# Patient Record
Sex: FEMALE | Race: BLACK OR AFRICAN AMERICAN | Employment: OTHER | URBAN - METROPOLITAN AREA
[De-identification: names, ages, dates, MRNs, and addresses within clinical notes are randomized per-mention and may not be internally consistent; named-entity substitution may affect disease eponyms.]

---

## 2017-01-14 ENCOUNTER — HOSPITAL ENCOUNTER (OUTPATIENT)
Dept: MAMMOGRAPHY | Facility: HOSPITAL | Age: 67
Discharge: HOME | End: 2017-01-14
Attending: INTERNAL MEDICINE
Payer: COMMERCIAL

## 2017-01-14 DIAGNOSIS — Z12.31 VISIT FOR SCREENING MAMMOGRAM: ICD-10-CM

## 2017-01-14 PROCEDURE — 77067 SCR MAMMO BI INCL CAD: CPT

## 2017-01-16 ENCOUNTER — OFFICE VISIT (OUTPATIENT)
Dept: INTERNAL MEDICINE CLINIC | Facility: CLINIC | Age: 67
End: 2017-01-16

## 2017-01-16 ENCOUNTER — OFFICE VISIT (OUTPATIENT)
Dept: PODIATRY CLINIC | Facility: CLINIC | Age: 67
End: 2017-01-16

## 2017-01-16 VITALS
DIASTOLIC BLOOD PRESSURE: 78 MMHG | RESPIRATION RATE: 16 BRPM | WEIGHT: 175.81 LBS | HEART RATE: 73 BPM | BODY MASS INDEX: 32.35 KG/M2 | SYSTOLIC BLOOD PRESSURE: 112 MMHG | HEIGHT: 62 IN

## 2017-01-16 DIAGNOSIS — M54.32 SCIATICA OF LEFT SIDE: ICD-10-CM

## 2017-01-16 DIAGNOSIS — M48.061 FORAMINAL STENOSIS OF LUMBAR REGION: Primary | ICD-10-CM

## 2017-01-16 DIAGNOSIS — L89.891 PRESSURE ULCER OF TOE OF RIGHT FOOT, STAGE 1: Primary | ICD-10-CM

## 2017-01-16 DIAGNOSIS — Z23 NEED FOR VACCINATION: ICD-10-CM

## 2017-01-16 PROCEDURE — 99214 OFFICE O/P EST MOD 30 MIN: CPT | Performed by: INTERNAL MEDICINE

## 2017-01-16 PROCEDURE — 99213 OFFICE O/P EST LOW 20 MIN: CPT

## 2017-01-16 PROCEDURE — 99212 OFFICE O/P EST SF 10 MIN: CPT | Performed by: INTERNAL MEDICINE

## 2017-01-16 PROCEDURE — 90471 IMMUNIZATION ADMIN: CPT | Performed by: INTERNAL MEDICINE

## 2017-01-16 PROCEDURE — 90472 IMMUNIZATION ADMIN EACH ADD: CPT | Performed by: INTERNAL MEDICINE

## 2017-01-16 PROCEDURE — 90662 IIV NO PRSV INCREASED AG IM: CPT | Performed by: INTERNAL MEDICINE

## 2017-01-16 PROCEDURE — 20600 DRAIN/INJ JOINT/BURSA W/O US: CPT

## 2017-01-16 PROCEDURE — 90732 PPSV23 VACC 2 YRS+ SUBQ/IM: CPT | Performed by: INTERNAL MEDICINE

## 2017-01-16 RX ORDER — GABAPENTIN 300 MG/1
300 CAPSULE ORAL 2 TIMES DAILY
Qty: 60 CAPSULE | Refills: 3 | Status: SHIPPED | OUTPATIENT
Start: 2017-01-16 | End: 2017-11-14

## 2017-01-16 RX ORDER — BETAMETHASONE SODIUM PHOSPHATE AND BETAMETHASONE ACETATE 3; 3 MG/ML; MG/ML
6 INJECTION, SUSPENSION INTRA-ARTICULAR; INTRALESIONAL; INTRAMUSCULAR; SOFT TISSUE ONCE
Status: COMPLETED | OUTPATIENT
Start: 2017-01-16 | End: 2017-01-16

## 2017-01-16 RX ADMIN — BETAMETHASONE SODIUM PHOSPHATE AND BETAMETHASONE ACETATE 6 MG: 3; 3 INJECTION, SUSPENSION INTRA-ARTICULAR; INTRALESIONAL; INTRAMUSCULAR; SOFT TISSUE at 16:09:00

## 2017-01-16 NOTE — PROGRESS NOTES
HPI:    Patient ID: Milo Lackey is a 74year old female. HPI Comments: Her back pain is still quite bad. She had the MRI done 12.20. 16. The gabapentin helps a little bit.   She has had spinal injections in the past.  She also had a right laminect She exhibits tenderness and pain. She exhibits normal range of motion, no deformity and no spasm. Neurological: She is alert and oriented to person, place, and time. She has normal strength. She displays normal reflexes. No motor deficit.      MRI lumbar

## 2017-01-16 NOTE — PROGRESS NOTES
HPI:    Patient ID: Brandy Mensah is a 74year old female. HPI     1. Right little toe pain   The pain is present in the right foot (right little toe). Treatments tried: cortisone 10/15/2015. The treatment provided significant relief.  But she report Rfl: 3   ibuprofen (MOTRIN) 200 MG Oral Tab Take 200 mg by mouth every 6 (six) hours as needed for Pain. Disp:  Rfl:    Omega-3-acid Ethyl Esters (LOVAZA) 1 G Oral Cap Take 1 g by mouth daily.  Disp:  Rfl:      Allergies:  Vicodin [Hydrocodon*    Itching 01/16/2017 4:08 PM

## 2017-01-16 NOTE — PROGRESS NOTES
Per verbal order of Dr. Migel Cook, injection of Lidocaine 2% 2cc and Celestone 6mg (1cc) drawn up in one syringe.

## 2017-05-01 ENCOUNTER — HOSPITAL ENCOUNTER (OUTPATIENT)
Dept: GENERAL RADIOLOGY | Facility: HOSPITAL | Age: 67
Discharge: HOME | End: 2017-05-01
Attending: NEUROLOGICAL SURGERY
Payer: COMMERCIAL

## 2017-05-01 DIAGNOSIS — Z13.828 SCOLIOSIS CONCERN: ICD-10-CM

## 2017-05-01 DIAGNOSIS — M54.5 LOW BACK PAIN, UNSPECIFIED BACK PAIN LATERALITY, UNSPECIFIED CHRONICITY, WITH SCIATICA PRESENCE UNSPECIFIED: ICD-10-CM

## 2017-05-01 PROCEDURE — 72110 X-RAY EXAM L-2 SPINE 4/>VWS: CPT

## 2017-05-01 PROCEDURE — 72082 X-RAY EXAM ENTIRE SPI 2/3 VW: CPT

## 2017-07-15 ENCOUNTER — LAB ENCOUNTER (OUTPATIENT)
Dept: LAB | Age: 67
End: 2017-07-15
Attending: INTERNAL MEDICINE
Payer: COMMERCIAL

## 2017-07-15 ENCOUNTER — OFFICE VISIT (OUTPATIENT)
Dept: INTERNAL MEDICINE CLINIC | Facility: CLINIC | Age: 67
End: 2017-07-15

## 2017-07-15 VITALS
BODY MASS INDEX: 26.2 KG/M2 | HEART RATE: 76 BPM | RESPIRATION RATE: 16 BRPM | HEIGHT: 62 IN | WEIGHT: 142.38 LBS | DIASTOLIC BLOOD PRESSURE: 61 MMHG | SYSTOLIC BLOOD PRESSURE: 95 MMHG

## 2017-07-15 DIAGNOSIS — M54.32 SCIATICA OF LEFT SIDE: Primary | ICD-10-CM

## 2017-07-15 DIAGNOSIS — Z00.00 ROUTINE HEALTH MAINTENANCE: ICD-10-CM

## 2017-07-15 DIAGNOSIS — E78.00 HYPERCHOLESTEROLEMIA: ICD-10-CM

## 2017-07-15 LAB
ALBUMIN SERPL BCP-MCNC: 3.7 G/DL (ref 3.5–4.8)
ALBUMIN/GLOB SERPL: 1.3 {RATIO} (ref 1–2)
ALP SERPL-CCNC: 72 U/L (ref 32–100)
ALT SERPL-CCNC: 21 U/L (ref 14–54)
ANION GAP SERPL CALC-SCNC: 6 MMOL/L (ref 0–18)
AST SERPL-CCNC: 21 U/L (ref 15–41)
BASOPHILS # BLD: 0.1 K/UL (ref 0–0.2)
BASOPHILS NFR BLD: 1 %
BILIRUB SERPL-MCNC: 0.6 MG/DL (ref 0.3–1.2)
BUN SERPL-MCNC: 16 MG/DL (ref 8–20)
BUN/CREAT SERPL: 18.4 (ref 10–20)
CALCIUM SERPL-MCNC: 9.1 MG/DL (ref 8.5–10.5)
CHLORIDE SERPL-SCNC: 105 MMOL/L (ref 95–110)
CHOLEST SERPL-MCNC: 195 MG/DL (ref 110–200)
CO2 SERPL-SCNC: 29 MMOL/L (ref 22–32)
CREAT SERPL-MCNC: 0.87 MG/DL (ref 0.5–1.5)
EOSINOPHIL # BLD: 0.3 K/UL (ref 0–0.7)
EOSINOPHIL NFR BLD: 4 %
ERYTHROCYTE [DISTWIDTH] IN BLOOD BY AUTOMATED COUNT: 13.8 % (ref 11–15)
GLOBULIN PLAS-MCNC: 2.9 G/DL (ref 2.5–3.7)
GLUCOSE SERPL-MCNC: 76 MG/DL (ref 70–99)
HCT VFR BLD AUTO: 39.9 % (ref 35–48)
HDLC SERPL-MCNC: 31 MG/DL
HGB BLD-MCNC: 13.8 G/DL (ref 12–16)
LDLC SERPL CALC-MCNC: 124 MG/DL (ref 0–99)
LYMPHOCYTES # BLD: 2.6 K/UL (ref 1–4)
LYMPHOCYTES NFR BLD: 30 %
MCH RBC QN AUTO: 30.9 PG (ref 27–32)
MCHC RBC AUTO-ENTMCNC: 34.5 G/DL (ref 32–37)
MCV RBC AUTO: 89.5 FL (ref 80–100)
MONOCYTES # BLD: 0.7 K/UL (ref 0–1)
MONOCYTES NFR BLD: 8 %
NEUTROPHILS # BLD AUTO: 4.9 K/UL (ref 1.8–7.7)
NEUTROPHILS NFR BLD: 57 %
NONHDLC SERPL-MCNC: 164 MG/DL
OSMOLALITY UR CALC.SUM OF ELEC: 290 MOSM/KG (ref 275–295)
PLATELET # BLD AUTO: 261 K/UL (ref 140–400)
PMV BLD AUTO: 9.2 FL (ref 7.4–10.3)
POTASSIUM SERPL-SCNC: 3.7 MMOL/L (ref 3.3–5.1)
PROT SERPL-MCNC: 6.6 G/DL (ref 5.9–8.4)
RBC # BLD AUTO: 4.46 M/UL (ref 3.7–5.4)
SODIUM SERPL-SCNC: 140 MMOL/L (ref 136–144)
TRIGL SERPL-MCNC: 202 MG/DL (ref 1–149)
WBC # BLD AUTO: 8.6 K/UL (ref 4–11)

## 2017-07-15 PROCEDURE — 80061 LIPID PANEL: CPT

## 2017-07-15 PROCEDURE — 99214 OFFICE O/P EST MOD 30 MIN: CPT | Performed by: INTERNAL MEDICINE

## 2017-07-15 PROCEDURE — 99212 OFFICE O/P EST SF 10 MIN: CPT | Performed by: INTERNAL MEDICINE

## 2017-07-15 PROCEDURE — 85025 COMPLETE CBC W/AUTO DIFF WBC: CPT

## 2017-07-15 PROCEDURE — 36415 COLL VENOUS BLD VENIPUNCTURE: CPT

## 2017-07-15 PROCEDURE — 83036 HEMOGLOBIN GLYCOSYLATED A1C: CPT

## 2017-07-15 PROCEDURE — 80053 COMPREHEN METABOLIC PANEL: CPT

## 2017-07-15 RX ORDER — CYCLOBENZAPRINE HCL 10 MG
10 TABLET ORAL 2 TIMES DAILY PRN
Qty: 60 TABLET | Refills: 3 | Status: SHIPPED | OUTPATIENT
Start: 2017-07-15 | End: 2017-10-17

## 2017-07-15 RX ORDER — PANTOPRAZOLE SODIUM 40 MG/1
40 TABLET, DELAYED RELEASE ORAL
COMMUNITY
Start: 2017-05-01 | End: 2017-09-13

## 2017-07-15 RX ORDER — BACLOFEN 10 MG/1
10 TABLET ORAL
COMMUNITY
End: 2017-08-31

## 2017-07-15 RX ORDER — LIOTHYRONINE SODIUM 5 UG/1
25 TABLET ORAL
COMMUNITY
End: 2017-08-31

## 2017-07-15 RX ORDER — ROSUVASTATIN CALCIUM 20 MG/1
20 TABLET, COATED ORAL
COMMUNITY
End: 2017-08-31

## 2017-07-15 RX ORDER — TRAMADOL HYDROCHLORIDE 50 MG/1
50 TABLET ORAL EVERY 6 HOURS PRN
COMMUNITY
End: 2018-07-03

## 2017-07-15 RX ORDER — CYCLOBENZAPRINE HCL 10 MG
10 TABLET ORAL
COMMUNITY
End: 2017-07-15

## 2017-07-16 LAB — HBA1C MFR BLD: 6.3 % (ref 4–6)

## 2017-07-22 ENCOUNTER — TELEPHONE (OUTPATIENT)
Dept: INTERNAL MEDICINE CLINIC | Facility: CLINIC | Age: 67
End: 2017-07-22

## 2017-07-22 ENCOUNTER — APPOINTMENT (OUTPATIENT)
Dept: LAB | Facility: HOSPITAL | Age: 67
End: 2017-07-22
Attending: INTERNAL MEDICINE
Payer: COMMERCIAL

## 2017-07-22 DIAGNOSIS — R94.6 ABNORMAL RESULTS OF THYROID FUNCTION STUDIES: ICD-10-CM

## 2017-07-22 DIAGNOSIS — Z78.0 ASYMPTOMATIC MENOPAUSE: ICD-10-CM

## 2017-07-22 DIAGNOSIS — Z00.00 HEALTH CARE MAINTENANCE: Primary | ICD-10-CM

## 2017-07-22 LAB — TSH SERPL-ACNC: 0.45 UIU/ML (ref 0.45–5.33)

## 2017-07-22 PROCEDURE — 84443 ASSAY THYROID STIM HORMONE: CPT

## 2017-07-22 PROCEDURE — 36415 COLL VENOUS BLD VENIPUNCTURE: CPT

## 2017-07-29 NOTE — TELEPHONE ENCOUNTER
I ordered the Dexa scan. Call [phone] to schedule it. I recommend that she schedule a complete physical also. For some reason she has appointments in October, 10 days apart. Neither one are physicals.   Perhaps she could schedule a physical in [facility]

## 2017-07-31 NOTE — TELEPHONE ENCOUNTER
Left message for patient to call back. Please transfer to Brooklyn Matute @ ext [phone] for today only.

## 2017-08-01 ENCOUNTER — TELEPHONE (OUTPATIENT)
Dept: NEUROLOGY | Facility: CLINIC | Age: 67
End: 2017-08-01

## 2017-08-02 NOTE — TELEPHONE ENCOUNTER
Spoke with patient and relayed 's message. Both follow up appointments were cancelled. Physical appointment made for 10/17/17 at 1 pm here at the [facility]. Patient voiced her understanding of this.

## 2017-08-03 ENCOUNTER — TELEPHONE (OUTPATIENT)
Dept: NEUROLOGY | Facility: CLINIC | Age: 67
End: 2017-08-03

## 2017-08-03 ENCOUNTER — OFFICE VISIT (OUTPATIENT)
Dept: NEUROLOGY | Facility: CLINIC | Age: 67
End: 2017-08-03

## 2017-08-03 VITALS
SYSTOLIC BLOOD PRESSURE: 114 MMHG | DIASTOLIC BLOOD PRESSURE: 70 MMHG | RESPIRATION RATE: 16 BRPM | BODY MASS INDEX: 29.88 KG/M2 | OXYGEN SATURATION: 95 % | HEART RATE: 65 BPM | WEIGHT: 175 LBS | HEIGHT: 64 IN

## 2017-08-03 DIAGNOSIS — M54.16 LUMBAR RADICULOPATHY: Primary | ICD-10-CM

## 2017-08-03 DIAGNOSIS — M43.16 SPONDYLOLISTHESIS OF LUMBAR REGION: ICD-10-CM

## 2017-08-03 DIAGNOSIS — M96.1 LUMBAR POST-LAMINECTOMY SYNDROME: ICD-10-CM

## 2017-08-03 DIAGNOSIS — M51.9 LUMBAR DISC DISEASE: ICD-10-CM

## 2017-08-03 DIAGNOSIS — M48.061 LUMBAR STENOSIS: ICD-10-CM

## 2017-08-03 DIAGNOSIS — M48.061 LUMBAR FORAMINAL STENOSIS: ICD-10-CM

## 2017-08-03 DIAGNOSIS — M51.26 LUMBAR HERNIATED DISC: ICD-10-CM

## 2017-08-03 PROCEDURE — 99244 OFF/OP CNSLTJ NEW/EST MOD 40: CPT | Performed by: PHYSICAL MEDICINE & REHABILITATION

## 2017-08-03 NOTE — TELEPHONE ENCOUNTER
Called [facility] BS for authorization of approval of bilateral L4 TFESIs cpt codes O6826274, R0587135. Talked to [address] [place] [address]. who states no authorization is required. Reference # [id number]. Will inform Nursing.

## 2017-08-03 NOTE — PROGRESS NOTES
Patient has been scheduled for a bilateral L4 TFESIs under MAC sedatiopn on 8/25/17 at the [facility]. Medications and allergies reviewed. Patient informed to hold aspirins, nsaids, blood thinners, vitamins and fish oils 5-7 days prior to procedure.  Patient info

## 2017-08-03 NOTE — PROGRESS NOTES
Low Back Pain H & P    Chief Complaint:  Patient presents with:  Low Back Pain: NP referred by Dr Jake Novoa. Pt is having back pain both sides,worse on left. Pain goes down leg to ankle constant varies in degree. Pain is 8/10 back and 10plus/10 left leg.  Pt c The pain is currently  8/10. The pain is described as a(n) aching and throbbing sensation and a shooting pain in the left knee. · The pain is worse standing and walking. · The pain is better sitting.   · There is numbness and tingling in the right an pain. The patient reports constipation due to her medications. :   The patient denies urinary problems. Endocrine:  Negative for cold and heat intolerance.   Neuro:   Negative for headaches, memory impairment  Psych:   Negative for anxiety, depression, tendon.     Vascular lower extremity:   Dorsalis pedis pulse-RIGHT 2+   Dorsalis pedis pulse-LEFT 2+   Tibialis posterior pulse-RIGHT 2+   Tibialis posterior pulse-LEFT 2+      Neurological Lower Extremity:    Light Touch Sensation: Intact in bilateral LE e L5-S1 left mod/right severe, L4-5 left mild-mod/right severe, L3-4 left severe, L2-3 left mod-severe foraminal stenosis    4. L3-4 mod-severe, L2-3 mod-severe, L1-2 mod central stenosis    5.  L5-S1 mild-mod diffuse & bilateral mod far lateral, L4-5 right l

## 2017-08-03 NOTE — PATIENT INSTRUCTIONS
Plan  I will do bilateral L4 TFESI's under MAC. The patient will follow up in 3 months, but the patient will call me 2 weeks after having the injection to let me know how the injection worked.     If she is doing better after having the injections, the

## 2017-08-25 ENCOUNTER — OFFICE VISIT (OUTPATIENT)
Dept: SURGERY | Facility: CLINIC | Age: 67
End: 2017-08-25

## 2017-08-25 DIAGNOSIS — M54.16 LUMBAR RADICULOPATHY: Primary | ICD-10-CM

## 2017-08-25 DIAGNOSIS — M96.1 LUMBAR POST-LAMINECTOMY SYNDROME: ICD-10-CM

## 2017-08-25 DIAGNOSIS — M48.061 LUMBAR STENOSIS: ICD-10-CM

## 2017-08-25 PROCEDURE — 64483 NJX AA&/STRD TFRM EPI L/S 1: CPT | Performed by: PHYSICAL MEDICINE & REHABILITATION

## 2017-08-25 NOTE — PROCEDURES
[facility]    BILATERAL LUMBAR TRANSFORAMINAL   NAME:  Evone Screws    MR #:    [id number] :  1950     PHYSICIAN:  Emery Bowman        Operative Report    DATE OF PROCEDURE: 2017   PREOPERATIVE DIAGNOSES: 1. bilat mg/cc of Kenalog and 1.5 cc of 1% PF lidocaine without epinephrine. After this, the needle was removed. Then  fluoroscopy was right anterior obliqued opening up the left L4-5 intervertebral foramen.   At this point in time, the patient's skin was anesthet

## 2017-09-07 ENCOUNTER — NURSE TRIAGE (OUTPATIENT)
Dept: OTHER | Age: 67
End: 2017-09-07

## 2017-09-07 ENCOUNTER — HOSPITAL (OUTPATIENT)
Dept: OTHER | Age: 67
End: 2017-09-07
Attending: INTERNAL MEDICINE

## 2017-09-07 LAB
ALBUMIN SERPL-MCNC: 3.6 GM/DL (ref 3.6–5.1)
ALBUMIN/GLOB SERPL: 0.9 {RATIO} (ref 1–2.4)
ALP SERPL-CCNC: 83 UNIT/L (ref 45–117)
ALT SERPL-CCNC: 23 UNIT/L
ANALYZER ANC (IANC): ABNORMAL
ANION GAP SERPL CALC-SCNC: 10 MMOL/L (ref 10–20)
AST SERPL-CCNC: 16 UNIT/L
BASOPHILS # BLD: 0 THOUSAND/MCL (ref 0–0.3)
BASOPHILS NFR BLD: 0 %
BILIRUB SERPL-MCNC: 0.7 MG/DL (ref 0.2–1)
BUN SERPL-MCNC: 22 MG/DL (ref 6–20)
BUN/CREAT SERPL: 26 (ref 7–25)
CALCIUM SERPL-MCNC: 9.7 MG/DL (ref 8.4–10.2)
CHLORIDE: 101 MMOL/L (ref 98–107)
CO2 SERPL-SCNC: 31 MMOL/L (ref 21–32)
CREAT SERPL-MCNC: 0.85 MG/DL (ref 0.51–0.95)
DIFFERENTIAL METHOD BLD: ABNORMAL
EOSINOPHIL # BLD: 0.2 THOUSAND/MCL (ref 0.1–0.5)
EOSINOPHIL NFR BLD: 1 %
ERYTHROCYTE [DISTWIDTH] IN BLOOD: 14.4 % (ref 11–15)
GLOBULIN SER-MCNC: 4.1 GM/DL (ref 2–4)
GLUCOSE BLDC GLUCOMTR-MCNC: 178 MG/DL (ref 65–99)
GLUCOSE SERPL-MCNC: 112 MG/DL (ref 65–99)
HEMATOCRIT: 42.8 % (ref 36–46.5)
HGB BLD-MCNC: 14.4 GM/DL (ref 12–15.5)
LYMPHOCYTES # BLD: 2.5 THOUSAND/MCL (ref 1–4)
LYMPHOCYTES NFR BLD: 16 %
MCH RBC QN AUTO: 30.6 PG (ref 26–34)
MCHC RBC AUTO-ENTMCNC: 33.6 GM/DL (ref 32–36.5)
MCV RBC AUTO: 90.9 FL (ref 78–100)
MONOCYTES # BLD: 0.8 THOUSAND/MCL (ref 0.3–0.9)
MONOCYTES NFR BLD: 5 %
NEUTROPHILS # BLD: 11.9 THOUSAND/MCL (ref 1.8–7.7)
NEUTROPHILS NFR BLD: 78 %
NEUTS SEG NFR BLD: ABNORMAL %
PERCENT NRBC: ABNORMAL
PLATELET # BLD: 324 THOUSAND/MCL (ref 140–450)
POTASSIUM SERPL-SCNC: 4.2 MMOL/L (ref 3.4–5.1)
PROCALCITONIN SERPL IA-MCNC: <0.05 NG/ML
PROT SERPL-MCNC: 7.7 GM/DL (ref 6.4–8.2)
RBC # BLD: 4.71 MILLION/MCL (ref 4–5.2)
SODIUM SERPL-SCNC: 138 MMOL/L (ref 135–145)
TROPONIN I SERPL HS-MCNC: <0.02 NG/ML
TROPONIN I SERPL HS-MCNC: <0.02 NG/ML
WBC # BLD: 15.5 THOUSAND/MCL (ref 4.2–11)

## 2017-09-07 NOTE — TELEPHONE ENCOUNTER
Action Requested: Summary for Provider     []  Critical Lab, Recommendations Needed  [] Need Additional Advice  [x]   FYI    []   Need Orders  [] Need Medications Sent to Pharmacy  []  Other     SUMMARY: Pt was advised to go to ER via EMS.      Pt states th

## 2017-09-08 ENCOUNTER — IMAGING SERVICES (OUTPATIENT)
Dept: OTHER | Age: 67
End: 2017-09-08

## 2017-09-08 LAB
ANALYZER ANC (IANC): ABNORMAL
ANION GAP SERPL CALC-SCNC: 10 MMOL/L (ref 10–20)
BASOPHILS # BLD: 0 THOUSAND/MCL (ref 0–0.3)
BASOPHILS NFR BLD: 0 %
BUN SERPL-MCNC: 20 MG/DL (ref 6–20)
BUN/CREAT SERPL: 22 (ref 7–25)
CALCIUM SERPL-MCNC: 9.4 MG/DL (ref 8.4–10.2)
CHLORIDE: 102 MMOL/L (ref 98–107)
CHOLEST SERPL-MCNC: 211 MG/DL
CHOLEST/HDLC SERPL: 5.4 {RATIO}
CO2 SERPL-SCNC: 33 MMOL/L (ref 21–32)
CREAT SERPL-MCNC: 0.93 MG/DL (ref 0.51–0.95)
DIFFERENTIAL METHOD BLD: ABNORMAL
EOSINOPHIL # BLD: 0.2 THOUSAND/MCL (ref 0.1–0.5)
EOSINOPHIL NFR BLD: 1 %
ERYTHROCYTE [DISTWIDTH] IN BLOOD: 14.4 % (ref 11–15)
GLUCOSE SERPL-MCNC: 110 MG/DL (ref 65–99)
HDLC SERPL-MCNC: 39 MG/DL
HEMATOCRIT: 41.9 % (ref 36–46.5)
HGB BLD-MCNC: 13.6 GM/DL (ref 12–15.5)
LDLC SERPL CALC-MCNC: 134 MG/DL
LYMPHOCYTES # BLD: 2.7 THOUSAND/MCL (ref 1–4)
LYMPHOCYTES NFR BLD: 19 %
MCH RBC QN AUTO: 30.3 PG (ref 26–34)
MCHC RBC AUTO-ENTMCNC: 32.5 GM/DL (ref 32–36.5)
MCV RBC AUTO: 93.3 FL (ref 78–100)
MONOCYTES # BLD: 1 THOUSAND/MCL (ref 0.3–0.9)
MONOCYTES NFR BLD: 7 %
NEUTROPHILS # BLD: 10.5 THOUSAND/MCL (ref 1.8–7.7)
NEUTROPHILS NFR BLD: 73 %
NEUTS SEG NFR BLD: ABNORMAL %
NONHDLC SERPL-MCNC: 172 MG/DL
PERCENT NRBC: ABNORMAL
PLATELET # BLD: 306 THOUSAND/MCL (ref 140–450)
POTASSIUM SERPL-SCNC: 4.2 MMOL/L (ref 3.4–5.1)
RBC # BLD: 4.49 MILLION/MCL (ref 4–5.2)
SODIUM SERPL-SCNC: 141 MMOL/L (ref 135–145)
TRIGLYCERIDE (TRIGP): 192 MG/DL
TSH SERPL-ACNC: 0.41 MCUNIT/ML (ref 0.35–5)
WBC # BLD: 14.5 THOUSAND/MCL (ref 4.2–11)

## 2017-09-09 ENCOUNTER — IMAGING SERVICES (OUTPATIENT)
Dept: OTHER | Age: 67
End: 2017-09-09

## 2017-09-09 ENCOUNTER — TELEPHONE (OUTPATIENT)
Dept: INTERNAL MEDICINE CLINIC | Facility: CLINIC | Age: 67
End: 2017-09-09

## 2017-09-09 LAB
ANALYZER ANC (IANC): ABNORMAL
ANION GAP SERPL CALC-SCNC: 13 MMOL/L (ref 10–20)
BASOPHILS # BLD: 0 THOUSAND/MCL (ref 0–0.3)
BASOPHILS NFR BLD: 0 %
BUN SERPL-MCNC: 20 MG/DL (ref 6–20)
BUN/CREAT SERPL: 28 (ref 7–25)
CALCIUM SERPL-MCNC: 9.2 MG/DL (ref 8.4–10.2)
CHLORIDE: 105 MMOL/L (ref 98–107)
CO2 SERPL-SCNC: 26 MMOL/L (ref 21–32)
CREAT SERPL-MCNC: 0.71 MG/DL (ref 0.51–0.95)
DIFFERENTIAL METHOD BLD: ABNORMAL
EOSINOPHIL # BLD: 0.1 THOUSAND/MCL (ref 0.1–0.5)
EOSINOPHIL NFR BLD: 1 %
ERYTHROCYTE [DISTWIDTH] IN BLOOD: 14.4 % (ref 11–15)
GLUCOSE SERPL-MCNC: 106 MG/DL (ref 65–99)
HEMATOCRIT: 41.9 % (ref 36–46.5)
HGB BLD-MCNC: 14 GM/DL (ref 12–15.5)
LYMPHOCYTES # BLD: 2.3 THOUSAND/MCL (ref 1–4)
LYMPHOCYTES NFR BLD: 19 %
MAGNESIUM SERPL-MCNC: 2.4 MG/DL (ref 1.7–2.4)
MCH RBC QN AUTO: 30.4 PG (ref 26–34)
MCHC RBC AUTO-ENTMCNC: 33.4 GM/DL (ref 32–36.5)
MCV RBC AUTO: 91.1 FL (ref 78–100)
MONOCYTES # BLD: 0.9 THOUSAND/MCL (ref 0.3–0.9)
MONOCYTES NFR BLD: 7 %
NEUTROPHILS # BLD: 9 THOUSAND/MCL (ref 1.8–7.7)
NEUTROPHILS NFR BLD: 73 %
NEUTS SEG NFR BLD: ABNORMAL %
PERCENT NRBC: ABNORMAL
PLATELET # BLD: 256 THOUSAND/MCL (ref 140–450)
POTASSIUM SERPL-SCNC: 4.3 MMOL/L (ref 3.4–5.1)
RBC # BLD: 4.6 MILLION/MCL (ref 4–5.2)
SODIUM SERPL-SCNC: 140 MMOL/L (ref 135–145)
WBC # BLD: 12.4 THOUSAND/MCL (ref 4.2–11)

## 2017-09-09 NOTE — TELEPHONE ENCOUNTER
Patient called and stated she is currently in the hospital at [facility] and will be discharged soon-    Requesting for an for next week-  Requesting a call back from office-

## 2017-09-09 NOTE — TELEPHONE ENCOUNTER
Pt states currently at [facility] as advised on 9/7/17 by office RN ( see triage encounter 9/7) for complaints of chest pain. Still admitted as inpatient; having workup.  Reports she had first half of stress test yesterday and today is scheduled to ha

## 2017-09-11 NOTE — TELEPHONE ENCOUNTER
Pt called in to follow up. Pt was scheduled for tomorrow, did not need to use a SDS, there was a cancellation already.

## 2017-09-12 ENCOUNTER — TELEPHONE (OUTPATIENT)
Dept: INTERNAL MEDICINE CLINIC | Facility: CLINIC | Age: 67
End: 2017-09-12

## 2017-09-12 ENCOUNTER — OFFICE VISIT (OUTPATIENT)
Dept: INTERNAL MEDICINE CLINIC | Facility: CLINIC | Age: 67
End: 2017-09-12

## 2017-09-12 ENCOUNTER — APPOINTMENT (OUTPATIENT)
Dept: GENERAL RADIOLOGY | Facility: HOSPITAL | Age: 67
DRG: 247 | End: 2017-09-12
Attending: EMERGENCY MEDICINE
Payer: COMMERCIAL

## 2017-09-12 ENCOUNTER — HOSPITAL ENCOUNTER (INPATIENT)
Facility: HOSPITAL | Age: 67
LOS: 1 days | Discharge: HOME | DRG: 247 | End: 2017-09-13
Attending: EMERGENCY MEDICINE | Admitting: HOSPITALIST
Payer: COMMERCIAL

## 2017-09-12 ENCOUNTER — APPOINTMENT (OUTPATIENT)
Dept: INTERVENTIONAL RADIOLOGY/VASCULAR | Facility: HOSPITAL | Age: 67
DRG: 247 | End: 2017-09-12
Attending: INTERNAL MEDICINE
Payer: COMMERCIAL

## 2017-09-12 VITALS
WEIGHT: 165.13 LBS | HEART RATE: 83 BPM | HEIGHT: 64 IN | SYSTOLIC BLOOD PRESSURE: 124 MMHG | DIASTOLIC BLOOD PRESSURE: 85 MMHG | BODY MASS INDEX: 28.19 KG/M2

## 2017-09-12 DIAGNOSIS — I20.0 UNSTABLE ANGINA (HCC): Primary | ICD-10-CM

## 2017-09-12 DIAGNOSIS — R94.39 ABNORMAL STRESS TEST: ICD-10-CM

## 2017-09-12 DIAGNOSIS — E78.5 DYSLIPIDEMIA: ICD-10-CM

## 2017-09-12 DIAGNOSIS — R07.9 ACUTE CHEST PAIN: Primary | ICD-10-CM

## 2017-09-12 DIAGNOSIS — R73.03 PREDIABETES: ICD-10-CM

## 2017-09-12 LAB
ANION GAP SERPL CALC-SCNC: 8 MMOL/L (ref 0–18)
APTT PPP: 30.6 SECONDS (ref 23.2–35.3)
APTT PPP: 40.5 SECONDS (ref 23.2–35.3)
BASOPHILS # BLD: 0.1 K/UL (ref 0–0.2)
BASOPHILS NFR BLD: 1 %
BUN SERPL-MCNC: 16 MG/DL (ref 8–20)
BUN/CREAT SERPL: 15.2 (ref 10–20)
CALCIUM SERPL-MCNC: 9.6 MG/DL (ref 8.5–10.5)
CHLORIDE SERPL-SCNC: 101 MMOL/L (ref 95–110)
CO2 SERPL-SCNC: 30 MMOL/L (ref 22–32)
CREAT SERPL-MCNC: 1.05 MG/DL (ref 0.5–1.5)
EOSINOPHIL # BLD: 0.1 K/UL (ref 0–0.7)
EOSINOPHIL NFR BLD: 1 %
ERYTHROCYTE [DISTWIDTH] IN BLOOD BY AUTOMATED COUNT: 14.4 % (ref 11–15)
GLUCOSE SERPL-MCNC: 101 MG/DL (ref 70–99)
HCT VFR BLD AUTO: 43.5 % (ref 35–48)
HGB BLD-MCNC: 14.5 G/DL (ref 12–16)
INR BLD: 1 (ref 0.9–1.2)
INR BLD: 1.1 (ref 0.9–1.2)
LYMPHOCYTES # BLD: 2 K/UL (ref 1–4)
LYMPHOCYTES NFR BLD: 15 %
MCH RBC QN AUTO: 30.3 PG (ref 27–32)
MCHC RBC AUTO-ENTMCNC: 33.4 G/DL (ref 32–37)
MCV RBC AUTO: 90.5 FL (ref 80–100)
MONOCYTES # BLD: 0.8 K/UL (ref 0–1)
MONOCYTES NFR BLD: 6 %
MRSA DNA SPEC QL NAA+PROBE: NEGATIVE
NEUTROPHILS # BLD AUTO: 10.3 K/UL (ref 1.8–7.7)
NEUTROPHILS NFR BLD: 78 %
OSMOLALITY UR CALC.SUM OF ELEC: 289 MOSM/KG (ref 275–295)
PLATELET # BLD AUTO: 294 K/UL (ref 140–400)
PMV BLD AUTO: 9.1 FL (ref 7.4–10.3)
POTASSIUM SERPL-SCNC: 3.6 MMOL/L (ref 3.3–5.1)
PROTHROMBIN TIME: 12.3 SECONDS (ref 11.8–14.5)
PROTHROMBIN TIME: 13.5 SECONDS (ref 11.8–14.5)
RBC # BLD AUTO: 4.8 M/UL (ref 3.7–5.4)
SODIUM SERPL-SCNC: 139 MMOL/L (ref 136–144)
TROPONIN I SERPL-MCNC: 0 NG/ML (ref ?–0.03)
WBC # BLD AUTO: 13.2 K/UL (ref 4–11)

## 2017-09-12 PROCEDURE — 99222 1ST HOSP IP/OBS MODERATE 55: CPT | Performed by: HOSPITALIST

## 2017-09-12 PROCEDURE — 99214 OFFICE O/P EST MOD 30 MIN: CPT | Performed by: INTERNAL MEDICINE

## 2017-09-12 PROCEDURE — 99212 OFFICE O/P EST SF 10 MIN: CPT | Performed by: INTERNAL MEDICINE

## 2017-09-12 PROCEDURE — 71010 XR CHEST AP PORTABLE  (CPT=71010): CPT | Performed by: EMERGENCY MEDICINE

## 2017-09-12 RX ORDER — CHLORHEXIDINE GLUCONATE 4 G/100ML
30 SOLUTION TOPICAL
Status: COMPLETED | OUTPATIENT
Start: 2017-09-13 | End: 2017-09-13

## 2017-09-12 RX ORDER — ASPIRIN 81 MG/1
324 TABLET, CHEWABLE ORAL ONCE
Status: DISCONTINUED | OUTPATIENT
Start: 2017-09-12 | End: 2017-09-13

## 2017-09-12 RX ORDER — MIDAZOLAM HYDROCHLORIDE 1 MG/ML
INJECTION INTRAMUSCULAR; INTRAVENOUS
Status: COMPLETED
Start: 2017-09-12 | End: 2017-09-12

## 2017-09-12 RX ORDER — TRAMADOL HYDROCHLORIDE 50 MG/1
50 TABLET ORAL EVERY 6 HOURS PRN
Status: DISCONTINUED | OUTPATIENT
Start: 2017-09-12 | End: 2017-09-13

## 2017-09-12 RX ORDER — GABAPENTIN 300 MG/1
300 CAPSULE ORAL 2 TIMES DAILY
Status: DISCONTINUED | OUTPATIENT
Start: 2017-09-12 | End: 2017-09-13

## 2017-09-12 RX ORDER — PRASUGREL 10 MG/1
TABLET, FILM COATED ORAL
Status: COMPLETED
Start: 2017-09-12 | End: 2017-09-12

## 2017-09-12 RX ORDER — NITROGLYCERIN 20 MG/100ML
INJECTION INTRAVENOUS
Status: COMPLETED
Start: 2017-09-12 | End: 2017-09-12

## 2017-09-12 RX ORDER — BISACODYL 10 MG
10 SUPPOSITORY, RECTAL RECTAL
Status: DISCONTINUED | OUTPATIENT
Start: 2017-09-12 | End: 2017-09-13

## 2017-09-12 RX ORDER — CYCLOBENZAPRINE HCL 10 MG
10 TABLET ORAL 2 TIMES DAILY PRN
Status: DISCONTINUED | OUTPATIENT
Start: 2017-09-12 | End: 2017-09-13

## 2017-09-12 RX ORDER — ASPIRIN 81 MG/1
81 TABLET ORAL DAILY
Status: DISCONTINUED | OUTPATIENT
Start: 2017-09-13 | End: 2017-09-13

## 2017-09-12 RX ORDER — PRASUGREL 10 MG/1
10 TABLET, FILM COATED ORAL DAILY
Status: DISCONTINUED | OUTPATIENT
Start: 2017-09-13 | End: 2017-09-13

## 2017-09-12 RX ORDER — LIDOCAINE HYDROCHLORIDE 20 MG/ML
INJECTION, SOLUTION EPIDURAL; INFILTRATION; INTRACAUDAL; PERINEURAL
Status: COMPLETED
Start: 2017-09-12 | End: 2017-09-12

## 2017-09-12 RX ORDER — SODIUM CHLORIDE 9 MG/ML
INJECTION, SOLUTION INTRAVENOUS CONTINUOUS
Status: DISCONTINUED | OUTPATIENT
Start: 2017-09-12 | End: 2017-09-13

## 2017-09-12 RX ORDER — ONDANSETRON 2 MG/ML
4 INJECTION INTRAMUSCULAR; INTRAVENOUS EVERY 6 HOURS PRN
Status: DISCONTINUED | OUTPATIENT
Start: 2017-09-12 | End: 2017-09-13

## 2017-09-12 RX ORDER — SODIUM CHLORIDE 9 MG/ML
INJECTION, SOLUTION INTRAVENOUS
Status: COMPLETED
Start: 2017-09-12 | End: 2017-09-12

## 2017-09-12 RX ORDER — SODIUM CHLORIDE 9 MG/ML
INJECTION, SOLUTION INTRAVENOUS CONTINUOUS
Status: ACTIVE | OUTPATIENT
Start: 2017-09-12 | End: 2017-09-12

## 2017-09-12 RX ORDER — VERAPAMIL HYDROCHLORIDE 2.5 MG/ML
INJECTION, SOLUTION INTRAVENOUS
Status: COMPLETED
Start: 2017-09-12 | End: 2017-09-12

## 2017-09-12 RX ORDER — PANTOPRAZOLE SODIUM 40 MG/1
40 TABLET, DELAYED RELEASE ORAL
Status: DISCONTINUED | OUTPATIENT
Start: 2017-09-13 | End: 2017-09-13

## 2017-09-12 RX ORDER — HEPARIN SODIUM 1000 [USP'U]/ML
INJECTION, SOLUTION INTRAVENOUS; SUBCUTANEOUS
Status: COMPLETED
Start: 2017-09-12 | End: 2017-09-12

## 2017-09-12 RX ORDER — POLYETHYLENE GLYCOL 3350 17 G/17G
17 POWDER, FOR SOLUTION ORAL DAILY PRN
Status: DISCONTINUED | OUTPATIENT
Start: 2017-09-12 | End: 2017-09-13

## 2017-09-12 RX ORDER — ASPIRIN 81 MG/1
TABLET, CHEWABLE ORAL
Status: COMPLETED
Start: 2017-09-12 | End: 2017-09-12

## 2017-09-12 RX ORDER — SODIUM PHOSPHATE, DIBASIC AND SODIUM PHOSPHATE, MONOBASIC 7; 19 G/133ML; G/133ML
1 ENEMA RECTAL ONCE AS NEEDED
Status: DISCONTINUED | OUTPATIENT
Start: 2017-09-12 | End: 2017-09-13

## 2017-09-12 RX ORDER — DOCUSATE SODIUM 100 MG/1
100 CAPSULE, LIQUID FILLED ORAL 2 TIMES DAILY
Status: DISCONTINUED | OUTPATIENT
Start: 2017-09-12 | End: 2017-09-13

## 2017-09-12 RX ORDER — SODIUM CHLORIDE 0.9 % (FLUSH) 0.9 %
3 SYRINGE (ML) INJECTION AS NEEDED
Status: DISCONTINUED | OUTPATIENT
Start: 2017-09-12 | End: 2017-09-13

## 2017-09-12 NOTE — ED PROVIDER NOTES
Patient Seen in: [facility] AND [facility] Emergency Department    History   Patient presents with:  Chest Pain Angina (cardiovascular)    Stated Complaint:     HPI    Patient is a 64-year-old female who experienced some exertional chest pain last week.   She was °F (36.6 °C)  Temp src: Temporal  SpO2: 100 %  O2 Device: None (Room air)    Current:/76   Pulse 66   Temp 97.9 °F (36.6 °C) (Temporal)   Resp 12   Ht 162.6 cm (5' 4\")   Wt 74.8 kg   SpO2 99%   BMI 28.32 kg/m²         Physical Exam    Constitutional created for panel order CBC WITH DIFFERENTIAL WITH PLATELET.   Procedure                               Abnormality         Status                     ---------                               -----------         ------                     CBC W/ DIFFERENTIAL[

## 2017-09-12 NOTE — PLAN OF CARE
PATIENT ARRIVED IN [facility] AT 1254, PATIENT HAS NO COMPLAINTS. AT 25903, CALL RECEIVED FROM CATH LAB RN STATING THAT THEY ARE COMING TO TAKE PATIENT FOR CATH.  THIS RN WAS NOT ABLE TO REVIEW HOME MEDICATIONS WITH PATIENT, ABLE TO OBTAIN INFORMATION FOR ADMISSIO

## 2017-09-12 NOTE — TELEPHONE ENCOUNTER
[facility] and stress report was faxed to [phone] form faxed so that Meghan Mauro will have copy as well.

## 2017-09-12 NOTE — BRIEF PROCEDURE NOTE
[facility]    [facility]HS/[facility]G Cardiac Cath Procedure Note  Michael Ovalle Patient Status:  Inpatient    1950 MRN [id number]   Location [facility] Attending Charlotte Koenig MD   Psychiatric Day # 0 PCP Dm Myers,

## 2017-09-12 NOTE — CONSULTS
[facility] AND [facility]  MHS/AMG pre cath H&P    Eloy Betancur Patient Status:  Emergency    1950 MRN [id number]   Location [address] Attending Rao Schrader MD   Hosp Day # 0 PCP Trupti Brunson MD     69year old fe drugs.    Objective:   Temp: 97.9 °F (36.6 °C)  Pulse: 66  Resp: 12  BP: 122/76    Intake/Output:   No intake or output data in the 24 hours ending 09/12/17 1144    Physical Exam:     General: Alert and oriented x 3. No apparent distress.  No respiratory or

## 2017-09-12 NOTE — ED INITIAL ASSESSMENT (HPI)
Pt sent to ED by Dr. Demetria Dodd for admission. Pt was recently admitted at [facility] for Chest Pain and signed out [place]. Pt with abnl stress test result and needing cath.

## 2017-09-12 NOTE — CONSULTS
[facility]    PATIENT'S NAME: Chelsy Kapoor   ATTENDING PHYSICIAN: Ck Lee MD   CONSULTING PHYSICIAN: Nata Palomares.  Bambi Acosta MD   PATIENT ACCOUNT#:   [id number]    LOCATION:  [place] [address]  MEDICAL RECORD #:   [id number] have recommended that she go forward with a cardiac catheterization and intervention as needed. Discussed indications, risks, and benefits. I introduced her to Dr. Keyon Aguilar who will be performing the procedure today and she agrees to proceed.     Thank you fo

## 2017-09-12 NOTE — H&P
[facility]    PATIENT'S NAME: Oliver Perales   ATTENDING PHYSICIAN: Radha Santo MD   PATIENT ACCOUNT#:   [id number]    LOCATION:  [place] [address]  MEDICAL RECORD #:   [id number]       YOB: 1950  ADMISSION DATE: last week or 2, the symptoms have been more pronounced, becoming on less and less physical activity. She is asymptomatic at rest.  Currently, postprocedure, she is asymptomatic. Other 12-point review of systems is negative.       PHYSICAL EXAMINATION:

## 2017-09-12 NOTE — PROGRESS NOTES
HPI:    Patient ID: Thomas Palma is a 69year old female. Pt was hospitalized at [facility] for CP. She had CP for 2 days. She was taken by ambulance from work. NitroSL helped the pain.   They did tests in the ER which were negativ Outpatient Prescriptions:  Pantoprazole Sodium 40 MG Oral Tab EC Take 40 mg by mouth. Disp:  Rfl:    TraMADol HCl 50 MG Oral Tab Take 50 mg by mouth.  Disp:  Rfl:    Cyclobenzaprine HCl 10 MG Oral Tab Take 1 tablet (10 mg total) by mouth 2 (two) times daily cath and possible stent. I spoke with Dr. Cassidy Pike and he recommends admission to the hospital.         Abnormal stress test     Pt says she had an equivocal chemical stress test which was repeated.   She was then told she needed an angiogram.  Records fro

## 2017-09-12 NOTE — ASSESSMENT & PLAN NOTE
Pt has been having exertional CP, relieved with NTG. She is currently painfree. She left [place] prior to having a cath and possible stent.   I spoke with Dr. Maryse Cruz and he recommends admission to the hospital.

## 2017-09-12 NOTE — ASSESSMENT & PLAN NOTE
Pt says she had an equivocal chemical stress test which was repeated. She was then told she needed an angiogram.  Records from [facility] were not given to the pt, nor was she given medication.

## 2017-09-12 NOTE — PLAN OF CARE
Problem: Patient/Family Goals  Goal: Patient/Family Long Term Goal  Patient's Long Term Goal: medical compliance  Interventions:  - medication regimen  -doctors appointments annually and prn   - See additional Care Plan goals for specific interventions   O

## 2017-09-12 NOTE — ED NOTES
Pt signed HIPAA form, form given to [facility] for medical records. Pt updated on plan of care, report given to [facility] about above.  Prudy to tansport pt to CAA

## 2017-09-13 ENCOUNTER — TELEPHONE (OUTPATIENT)
Dept: INTERNAL MEDICINE CLINIC | Facility: CLINIC | Age: 67
End: 2017-09-13

## 2017-09-13 ENCOUNTER — PRIOR ORIGINAL RECORDS (OUTPATIENT)
Dept: OTHER | Age: 67
End: 2017-09-13

## 2017-09-13 VITALS
TEMPERATURE: 98 F | RESPIRATION RATE: 18 BRPM | BODY MASS INDEX: 28.09 KG/M2 | HEART RATE: 84 BPM | HEIGHT: 64 IN | DIASTOLIC BLOOD PRESSURE: 62 MMHG | OXYGEN SATURATION: 96 % | SYSTOLIC BLOOD PRESSURE: 109 MMHG | WEIGHT: 164.5 LBS

## 2017-09-13 LAB
ANION GAP SERPL CALC-SCNC: 9 MMOL/L (ref 0–18)
BASOPHILS # BLD: 0.1 K/UL (ref 0–0.2)
BASOPHILS NFR BLD: 1 %
BUN SERPL-MCNC: 14 MG/DL (ref 8–20)
BUN/CREAT SERPL: 16.5 (ref 10–20)
CALCIUM SERPL-MCNC: 9.3 MG/DL (ref 8.5–10.5)
CHLORIDE SERPL-SCNC: 103 MMOL/L (ref 95–110)
CHOLEST SERPL-MCNC: 200 MG/DL (ref 110–200)
CO2 SERPL-SCNC: 27 MMOL/L (ref 22–32)
CREAT SERPL-MCNC: 0.85 MG/DL (ref 0.5–1.5)
EOSINOPHIL # BLD: 0.2 K/UL (ref 0–0.7)
EOSINOPHIL NFR BLD: 2 %
ERYTHROCYTE [DISTWIDTH] IN BLOOD BY AUTOMATED COUNT: 14.3 % (ref 11–15)
GLUCOSE SERPL-MCNC: 109 MG/DL (ref 70–99)
HCT VFR BLD AUTO: 41.7 % (ref 35–48)
HDLC SERPL-MCNC: 36 MG/DL
HGB BLD-MCNC: 13.9 G/DL (ref 12–16)
LDLC SERPL CALC-MCNC: 145 MG/DL (ref 0–99)
LYMPHOCYTES # BLD: 2.6 K/UL (ref 1–4)
LYMPHOCYTES NFR BLD: 20 %
MAGNESIUM SERPL-MCNC: 2.2 MG/DL (ref 1.8–2.5)
MCH RBC QN AUTO: 30.4 PG (ref 27–32)
MCHC RBC AUTO-ENTMCNC: 33.4 G/DL (ref 32–37)
MCV RBC AUTO: 91 FL (ref 80–100)
MONOCYTES # BLD: 0.8 K/UL (ref 0–1)
MONOCYTES NFR BLD: 6 %
NEUTROPHILS # BLD AUTO: 9.2 K/UL (ref 1.8–7.7)
NEUTROPHILS NFR BLD: 71 %
NONHDLC SERPL-MCNC: 164 MG/DL
OSMOLALITY UR CALC.SUM OF ELEC: 289 MOSM/KG (ref 275–295)
PLATELET # BLD AUTO: 278 K/UL (ref 140–400)
PMV BLD AUTO: 9 FL (ref 7.4–10.3)
POTASSIUM SERPL-SCNC: 4.2 MMOL/L (ref 3.3–5.1)
RBC # BLD AUTO: 4.58 M/UL (ref 3.7–5.4)
SODIUM SERPL-SCNC: 139 MMOL/L (ref 136–144)
TRIGL SERPL-MCNC: 93 MG/DL (ref 1–149)
WBC # BLD AUTO: 12.9 K/UL (ref 4–11)

## 2017-09-13 PROCEDURE — B2151ZZ FLUOROSCOPY OF LEFT HEART USING LOW OSMOLAR CONTRAST: ICD-10-PCS | Performed by: INTERNAL MEDICINE

## 2017-09-13 PROCEDURE — 027034Z DILATION OF CORONARY ARTERY, ONE ARTERY WITH DRUG-ELUTING INTRALUMINAL DEVICE, PERCUTANEOUS APPROACH: ICD-10-PCS | Performed by: INTERNAL MEDICINE

## 2017-09-13 PROCEDURE — 4A023N7 MEASUREMENT OF CARDIAC SAMPLING AND PRESSURE, LEFT HEART, PERCUTANEOUS APPROACH: ICD-10-PCS | Performed by: INTERNAL MEDICINE

## 2017-09-13 PROCEDURE — B2111ZZ FLUOROSCOPY OF MULTIPLE CORONARY ARTERIES USING LOW OSMOLAR CONTRAST: ICD-10-PCS | Performed by: INTERNAL MEDICINE

## 2017-09-13 RX ORDER — ATORVASTATIN CALCIUM 40 MG/1
40 TABLET, FILM COATED ORAL DAILY
Status: DISCONTINUED | OUTPATIENT
Start: 2017-09-13 | End: 2017-09-13

## 2017-09-13 RX ORDER — BISACODYL 10 MG
10 SUPPOSITORY, RECTAL RECTAL
Qty: 20 SUPPOSITORY | Refills: 0 | Status: SHIPPED | OUTPATIENT
Start: 2017-09-13 | End: 2018-02-05 | Stop reason: ALTCHOICE

## 2017-09-13 RX ORDER — PANTOPRAZOLE SODIUM 40 MG/1
40 TABLET, DELAYED RELEASE ORAL
Qty: 90 TABLET | Refills: 1 | Status: SHIPPED | OUTPATIENT
Start: 2017-09-13 | End: 2017-10-17

## 2017-09-13 RX ORDER — ATORVASTATIN CALCIUM 40 MG/1
40 TABLET, FILM COATED ORAL DAILY
Qty: 90 TABLET | Refills: 0 | Status: SHIPPED | OUTPATIENT
Start: 2017-09-14 | End: 2017-11-14

## 2017-09-13 RX ORDER — ASPIRIN 81 MG/1
81 TABLET ORAL DAILY
Qty: 90 TABLET | Refills: 0 | Status: SHIPPED | OUTPATIENT
Start: 2017-09-14

## 2017-09-13 RX ORDER — PRASUGREL 10 MG/1
10 TABLET, FILM COATED ORAL DAILY
Qty: 90 TABLET | Refills: 3 | Status: SHIPPED | OUTPATIENT
Start: 2017-09-14 | End: 2017-11-14

## 2017-09-13 NOTE — PLAN OF CARE
Ok to discharge patient home. Discharge instructions explained to patient. Prescription given. Coupons for Effient given. Tele and IV discontinued. Patient sent home with family.

## 2017-09-13 NOTE — PLAN OF CARE
Patient wanted to drive home. Advised she cannot drive. She called her daughter. Will be here at 6pm to take her home.

## 2017-09-13 NOTE — TELEPHONE ENCOUNTER
Pt states that she gave Dr. Koki Fuller forms that needed to be signed before she returns back to work yesterday while at her doctors appointment.  Pt would like to know the status of the forms, and states that she is scheduled to return to work on Monday 9-18-

## 2017-09-13 NOTE — TELEPHONE ENCOUNTER
Pt would like a refill on her pantoprazole sodium 40 milligrams (not listed) pt would like this sent to Pharmacy[facility] [place], [place] PHONE [phone] AJR;[phone] (new pharmacy to pt) Pt states per her insurance this will have to

## 2017-09-13 NOTE — PROGRESS NOTES
[facility] AND [facility]  MHS/AMG Cardiology Progress Note    Brandy Mensah Patient Status:  Inpatient    1950 MRN [id number]   Location [facility] Attending Tristen Ball, [facility] Day # 1 PCP Iraj Schofield MD     69year old female, con Physical Exam:     General: Alert and oriented x 3. No apparent distress. No respiratory or constitutional distress. HEENT: Normocephalic, anicteric sclera, neck supple. Neck: No JVD, carotids 2+, no bruits. Cardiac: Regular rate and rhythm.  S1, S

## 2017-09-14 ENCOUNTER — MED REC SCAN ONLY (OUTPATIENT)
Dept: INTERNAL MEDICINE CLINIC | Facility: CLINIC | Age: 67
End: 2017-09-14

## 2017-09-14 ENCOUNTER — PATIENT OUTREACH (OUTPATIENT)
Dept: INTERNAL MEDICINE CLINIC | Facility: CLINIC | Age: 67
End: 2017-09-14

## 2017-09-14 LAB
BUN: 14 MG/DL
CALCIUM: 9.3 MG/DL
CHLORIDE: 103 MEQ/L
CHOLESTEROL, TOTAL: 200 MG/DL
CREATININE, SERUM: 0.85 MG/DL
GLUCOSE: 109 MG/DL
GLUCOSE: 109 MG/DL
HDL CHOLESTEROL: 36 MG/DL
HEMATOCRIT: 41.7 %
HEMOGLOBIN: 13.9 G/DL
LDL CHOLESTEROL: 145 MG/DL
NON-HDL CHOLESTEROL: 164 MG/DL
PLATELETS: 278 K/UL
POTASSIUM, SERUM: 4.2 MEQ/L
RED BLOOD COUNT: 4.58 X 10-6/U
SODIUM: 139 MEQ/L
TRIGLYCERIDES: 93 MG/DL
WHITE BLOOD COUNT: 12.9 X 10-3/U

## 2017-09-14 NOTE — PROGRESS NOTES
Initial Post Discharge Follow Up   Discharge Date: 9/13/17  Contact Date: 9/14/2017    Consent Verification:  Assessment Completed With: Patient  HIPAA Verified? Yes    1. Tell me why you were in the hospital?  I had chest pains.         2. What were your bathroom. 8. Which issues keep you from taking your medicine as prescribed? No issues keep me from taking my medicine    9. Does the doctor who prescribed the medicine know about the side effects you are having? No, not at this time     10.  Have you chest pain. Lives with daughter who is assisting. Referral/Contact:  no needs. [x]  Discharge Summary, medications and orders reviewed.

## 2017-09-14 NOTE — TELEPHONE ENCOUNTER
Left detailed message for patient. Forms for work have been completed. Need to know if she wants them faxed or if she wants to pick them up.

## 2017-09-14 NOTE — TELEPHONE ENCOUNTER
Pt called back looking to speak with Kym Wilcox. Bridget Gilliam from [facility] advised she was rooming a pt. Please advise.

## 2017-09-14 NOTE — PROGRESS NOTES
TCM Outreach call attempted. [facility]. ([phone]).  name and number provided for further follow up.

## 2017-09-14 NOTE — TELEPHONE ENCOUNTER
Forms faxed [facility] (LYNDSEY ALLEN) HR and fax confirmation received. Forms sent for scanning. Copy mailed to patient.

## 2017-09-18 ENCOUNTER — OFFICE VISIT (OUTPATIENT)
Dept: INTERNAL MEDICINE CLINIC | Facility: CLINIC | Age: 67
End: 2017-09-18

## 2017-09-18 VITALS
RESPIRATION RATE: 18 BRPM | HEIGHT: 64 IN | WEIGHT: 165 LBS | SYSTOLIC BLOOD PRESSURE: 104 MMHG | HEART RATE: 71 BPM | BODY MASS INDEX: 28.17 KG/M2 | DIASTOLIC BLOOD PRESSURE: 69 MMHG

## 2017-09-18 DIAGNOSIS — I25.10 CORONARY ARTERY DISEASE INVOLVING NATIVE CORONARY ARTERY OF NATIVE HEART WITHOUT ANGINA PECTORIS: Primary | ICD-10-CM

## 2017-09-18 DIAGNOSIS — E78.5 DYSLIPIDEMIA: ICD-10-CM

## 2017-09-18 DIAGNOSIS — Z95.5 S/P DRUG ELUTING CORONARY STENT PLACEMENT: ICD-10-CM

## 2017-09-18 PROCEDURE — 99495 TRANSJ CARE MGMT MOD F2F 14D: CPT | Performed by: INTERNAL MEDICINE

## 2017-09-18 NOTE — ASSESSMENT & PLAN NOTE
Reviewed hospital notes labs, and imaging reports. Pt had a PCI with ARMAND of mid LAD. She is now pain-free. She will f/u with cardiology and get an echo.

## 2017-09-18 NOTE — PROGRESS NOTES
HPI:    Brandy Mensah is a 69year old female here today for hospital follow up.    She was discharged from Inpatient hospital, [facility]  to Home   Admission Date: 9/12/17   Discharge Date: 9/13/17  Hospital Discharge Diagnosis:CAD s/p PCI wi with stent. The treatment provided significant relief. Allergies:  She is allergic to vicodin [hydrocodone-acetaminophen].     Current Meds:    Current Outpatient Prescriptions on File Prior to Visit:  aspirin 81 MG Oral Tab EC Take 1 tablet (81 mg body mass index is 28.32 kg/m² as calculated from the following:    Height as of this encounter: 5' 4\" (1.626 m). Weight as of this encounter: 165 lb (74.8 kg).    /69 (BP Location: Right arm, Patient Position: Sitting, Cuff Size: large)   Pulse 7 moderate    Patient seen within 7 days from date of discharge.      Angela Seo MD, 9/18/2017

## 2017-09-26 ENCOUNTER — MYAURORA ACCOUNT LINK (OUTPATIENT)
Dept: OTHER | Age: 67
End: 2017-09-26

## 2017-09-26 ENCOUNTER — PRIOR ORIGINAL RECORDS (OUTPATIENT)
Dept: OTHER | Age: 67
End: 2017-09-26

## 2017-10-12 ENCOUNTER — PRIOR ORIGINAL RECORDS (OUTPATIENT)
Dept: OTHER | Age: 67
End: 2017-10-12

## 2017-10-17 ENCOUNTER — OFFICE VISIT (OUTPATIENT)
Dept: INTERNAL MEDICINE CLINIC | Facility: CLINIC | Age: 67
End: 2017-10-17

## 2017-10-17 VITALS
HEIGHT: 64 IN | BODY MASS INDEX: 28.48 KG/M2 | HEART RATE: 72 BPM | SYSTOLIC BLOOD PRESSURE: 106 MMHG | DIASTOLIC BLOOD PRESSURE: 72 MMHG | WEIGHT: 166.81 LBS | RESPIRATION RATE: 18 BRPM

## 2017-10-17 DIAGNOSIS — I25.10 CORONARY ARTERY DISEASE INVOLVING NATIVE CORONARY ARTERY OF NATIVE HEART WITHOUT ANGINA PECTORIS: ICD-10-CM

## 2017-10-17 DIAGNOSIS — M54.32 SCIATICA OF LEFT SIDE: ICD-10-CM

## 2017-10-17 DIAGNOSIS — Z00.00 ROUTINE HEALTH MAINTENANCE: Primary | ICD-10-CM

## 2017-10-17 DIAGNOSIS — R73.03 PREDIABETES: ICD-10-CM

## 2017-10-17 DIAGNOSIS — Z12.31 VISIT FOR SCREENING MAMMOGRAM: ICD-10-CM

## 2017-10-17 DIAGNOSIS — E78.5 DYSLIPIDEMIA: ICD-10-CM

## 2017-10-17 PROBLEM — I20.0 UNSTABLE ANGINA (HCC): Status: RESOLVED | Noted: 2017-09-12 | Resolved: 2017-10-17

## 2017-10-17 PROBLEM — R07.9 ACUTE CHEST PAIN: Status: RESOLVED | Noted: 2017-09-12 | Resolved: 2017-10-17

## 2017-10-17 PROBLEM — R94.39 ABNORMAL STRESS TEST: Status: RESOLVED | Noted: 2017-09-12 | Resolved: 2017-10-17

## 2017-10-17 PROCEDURE — 99397 PER PM REEVAL EST PAT 65+ YR: CPT | Performed by: INTERNAL MEDICINE

## 2017-10-17 PROCEDURE — 99212 OFFICE O/P EST SF 10 MIN: CPT | Performed by: INTERNAL MEDICINE

## 2017-10-17 PROCEDURE — 99213 OFFICE O/P EST LOW 20 MIN: CPT | Performed by: INTERNAL MEDICINE

## 2017-10-17 RX ORDER — CYCLOBENZAPRINE HCL 10 MG
10 TABLET ORAL 2 TIMES DAILY PRN
Qty: 90 TABLET | Refills: 5 | Status: SHIPPED | OUTPATIENT
Start: 2017-10-17 | End: 2018-07-03

## 2017-10-17 NOTE — ASSESSMENT & PLAN NOTE
Unremarkable exam.  She is up-to-date on her colonoscopy  Immunizations were reviewed. Counseled pt regarding diet and exercise. Reviewed labs with pt. We are out of high dose flu shots today.   Advised pt to get at the pharmacy or call back and reschedu

## 2017-10-17 NOTE — ASSESSMENT & PLAN NOTE
Discussed with pt the diagnosis of prediabetes. Discussed possible progression to diabetes with accompanying complications. Advised pt importance of weight loss. Counseled pt regarding low carbohydrate diet.

## 2017-10-17 NOTE — PROGRESS NOTES
HPI:    Patient ID: Shola Dang is a 69year old female. Pt is here for a physical.    Her back has been bothering her. She would like to take the cyclobenzaprine more frequently. Sometimes she feels like her body is racing--she feels jittery. 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   Left Ear: Tympanic membrane normal.   Nose: Nose normal.   Mouth/Throat: Oropharynx is clear and moist.   Eyes: Conjunctivae and EOM are normal. Pupils are equal, round, and reactive to light.    Neck: Normal range o pt to get at the pharmacy or call back and reschedule.          Relevant Orders    XR DEXA BONE DENSITOMETRY (CPT=06217)    TSH W REFLEX TO FREE T4    Coronary artery disease involving native coronary artery of native heart without angina pectoris     Stabl

## 2017-10-17 NOTE — PATIENT INSTRUCTIONS
Follow a low carbohydrate diet. That includes sweets, bread, rice, potatoes, and pasta. Eat no more than 100 gm of carbs daily. You should try to eat more salads with lowfat dressing, steamed vegetables, and broiled or grilled chicken and fish.  Weigh

## 2017-10-17 NOTE — ASSESSMENT & PLAN NOTE
Increase flexeril to tid. Cannot increase gabapentin because of side effects. Will f/u with Dr. Rupinder Pinto.

## 2017-10-25 ENCOUNTER — MYAURORA ACCOUNT LINK (OUTPATIENT)
Dept: OTHER | Age: 67
End: 2017-10-25

## 2017-10-30 ENCOUNTER — CARDPULM VISIT (OUTPATIENT)
Dept: CARDIAC REHAB | Facility: HOSPITAL | Age: 67
End: 2017-10-30
Attending: INTERNAL MEDICINE
Payer: COMMERCIAL

## 2017-10-30 PROCEDURE — 93798 PHYS/QHP OP CAR RHAB W/ECG: CPT

## 2017-11-01 ENCOUNTER — CARDPULM VISIT (OUTPATIENT)
Dept: CARDIAC REHAB | Facility: HOSPITAL | Age: 67
End: 2017-11-01
Attending: INTERNAL MEDICINE
Payer: MEDICARE

## 2017-11-01 ENCOUNTER — MED REC SCAN ONLY (OUTPATIENT)
Dept: INTERNAL MEDICINE CLINIC | Facility: CLINIC | Age: 67
End: 2017-11-01

## 2017-11-01 PROCEDURE — 93798 PHYS/QHP OP CAR RHAB W/ECG: CPT

## 2017-11-03 ENCOUNTER — CARDPULM VISIT (OUTPATIENT)
Dept: CARDIAC REHAB | Facility: HOSPITAL | Age: 67
End: 2017-11-03
Attending: INTERNAL MEDICINE
Payer: MEDICARE

## 2017-11-03 PROCEDURE — 93798 PHYS/QHP OP CAR RHAB W/ECG: CPT

## 2017-11-06 ENCOUNTER — OFFICE VISIT (OUTPATIENT)
Dept: NEUROLOGY | Facility: CLINIC | Age: 67
End: 2017-11-06

## 2017-11-06 ENCOUNTER — CARDPULM VISIT (OUTPATIENT)
Dept: CARDIAC REHAB | Facility: HOSPITAL | Age: 67
End: 2017-11-06
Attending: INTERNAL MEDICINE
Payer: MEDICARE

## 2017-11-06 VITALS
WEIGHT: 164 LBS | DIASTOLIC BLOOD PRESSURE: 78 MMHG | HEART RATE: 88 BPM | SYSTOLIC BLOOD PRESSURE: 122 MMHG | BODY MASS INDEX: 28 KG/M2 | RESPIRATION RATE: 16 BRPM | HEIGHT: 64 IN

## 2017-11-06 DIAGNOSIS — M48.062 SPINAL STENOSIS OF LUMBAR REGION WITH NEUROGENIC CLAUDICATION: ICD-10-CM

## 2017-11-06 DIAGNOSIS — M54.16 LUMBAR RADICULOPATHY: ICD-10-CM

## 2017-11-06 DIAGNOSIS — M48.061 LUMBAR FORAMINAL STENOSIS: ICD-10-CM

## 2017-11-06 DIAGNOSIS — M51.9 LUMBAR DISC DISEASE: ICD-10-CM

## 2017-11-06 DIAGNOSIS — M43.16 SPONDYLOLISTHESIS OF LUMBAR REGION: ICD-10-CM

## 2017-11-06 DIAGNOSIS — M96.1 LUMBAR POST-LAMINECTOMY SYNDROME: ICD-10-CM

## 2017-11-06 DIAGNOSIS — I25.10 CORONARY ARTERY DISEASE INVOLVING NATIVE CORONARY ARTERY OF NATIVE HEART WITHOUT ANGINA PECTORIS: Primary | ICD-10-CM

## 2017-11-06 DIAGNOSIS — M51.26 LUMBAR HERNIATED DISC: ICD-10-CM

## 2017-11-06 PROCEDURE — 93798 PHYS/QHP OP CAR RHAB W/ECG: CPT

## 2017-11-06 PROCEDURE — 99214 OFFICE O/P EST MOD 30 MIN: CPT | Performed by: PHYSICAL MEDICINE & REHABILITATION

## 2017-11-06 RX ORDER — PANTOPRAZOLE SODIUM 40 MG/1
40 TABLET, DELAYED RELEASE ORAL
COMMUNITY
End: 2018-05-14

## 2017-11-06 NOTE — PATIENT INSTRUCTIONS
Refill policies:    • Allow 2 business days for refills; controlled substances may take longer.   • Contact your pharmacy at least 5 days prior to running out of medication and have them send an electronic request or submit request through the “request re your physician has recommended that you have a procedure or additional testing performed. [facility]) will contact your insurance carrier to obtain pre-certification or prior authorization.     Unfortunately, LUZ MARIA has seen an increas

## 2017-11-06 NOTE — PROGRESS NOTES
Low Back Pain H & P    Chief Complaint: Patient presents with:  Low Back Pain: LOV 08/03/17. Pt had injection 08/25/17 bialteral L4 TFESI that resolved right back pain but pt is still having left back pain and injection did not for left.  Pt is having radia History:   Diagnosis Date   • History of heart artery stent 09/2017   • Hx of repair of rotator cuff 2010    per ng bilateral   • Knee injury 7/15    left       Past Surgical History   Past Surgical History:  No date: ANESTH,SURGERY OF SHOULDER  2006: BACK Tender at L3, L4, L5 and S1   Z-joints: Tender at  left L3-4, left L4-5 and left L5-S1   SIJ: Non-tender for bilateral SIJ   Piriformis Muscle: Non-tender bilateral Piriformis muscles   Greater Trochanteric Bursa: Non-tender for bilateral Greater Trochante once she has completed cardiac rehabilitation and is able to come off of the anticoagluants. The patient understands and agrees with the stated plan. Celi Raya MD  11/6/2017

## 2017-11-07 ENCOUNTER — PRIOR ORIGINAL RECORDS (OUTPATIENT)
Dept: OTHER | Age: 67
End: 2017-11-07

## 2017-11-08 ENCOUNTER — CARDPULM VISIT (OUTPATIENT)
Dept: CARDIAC REHAB | Facility: HOSPITAL | Age: 67
End: 2017-11-08
Attending: INTERNAL MEDICINE
Payer: MEDICARE

## 2017-11-08 PROCEDURE — 93798 PHYS/QHP OP CAR RHAB W/ECG: CPT

## 2017-11-10 ENCOUNTER — CARDPULM VISIT (OUTPATIENT)
Dept: CARDIAC REHAB | Facility: HOSPITAL | Age: 67
End: 2017-11-10
Attending: INTERNAL MEDICINE
Payer: MEDICARE

## 2017-11-10 PROCEDURE — 93798 PHYS/QHP OP CAR RHAB W/ECG: CPT

## 2017-11-13 ENCOUNTER — CARDPULM VISIT (OUTPATIENT)
Dept: CARDIAC REHAB | Facility: HOSPITAL | Age: 67
End: 2017-11-13
Attending: INTERNAL MEDICINE
Payer: MEDICARE

## 2017-11-13 ENCOUNTER — TELEPHONE (OUTPATIENT)
Dept: INTERNAL MEDICINE CLINIC | Facility: CLINIC | Age: 67
End: 2017-11-13

## 2017-11-13 DIAGNOSIS — M54.32 SCIATICA OF LEFT SIDE: ICD-10-CM

## 2017-11-13 PROCEDURE — 93798 PHYS/QHP OP CAR RHAB W/ECG: CPT

## 2017-11-13 NOTE — TELEPHONE ENCOUNTER
Pt requesting refills for     atorvastatin 40 MG Oral Tab Take 1 tablet (40 mg total) by mouth daily. Disp: 90 tablet Rfl: 0   Prasugrel HCl 10 MG Oral Tab Take 1 tablet (10 mg total) by mouth daily.  Disp: 90 tablet Rfl: 3   gabapentin 300 MG Oral Cap Take

## 2017-11-14 RX ORDER — GABAPENTIN 300 MG/1
300 CAPSULE ORAL 2 TIMES DAILY
Qty: 60 CAPSULE | Refills: 2 | Status: SHIPPED | OUTPATIENT
Start: 2017-11-14 | End: 2018-01-12

## 2017-11-14 RX ORDER — PRASUGREL 10 MG/1
10 TABLET, FILM COATED ORAL DAILY
Qty: 90 TABLET | Refills: 2 | Status: SHIPPED | OUTPATIENT
Start: 2017-11-14 | End: 2018-12-08

## 2017-11-14 RX ORDER — ATORVASTATIN CALCIUM 40 MG/1
40 TABLET, FILM COATED ORAL DAILY
Qty: 90 TABLET | Refills: 0 | Status: SHIPPED | OUTPATIENT
Start: 2017-11-14 | End: 2018-02-11

## 2017-11-14 NOTE — TELEPHONE ENCOUNTER
PRASUGREL chart reviewed 2 refills available, erx to new pharmacy  GABAPENTIN Medication refilled for 90 days per protocol.   Refill Protocol Appointment Criteria  · Appointment scheduled in the past 6 months or in the next 3 months  Recent Outpatient Visit [facility] PHASE 2 03049 Darius Chavarria Cardiopulmonary Rehab STENTX1-AMIN    In 3 weeks [facility] PHASE 2 76671 Darius Chavarria Cardiopulmonary Rehab STENTX1-AMIN    In 3 weeks [facility] PHASE 2 10371 Darius Chavarria Cardiopulmonary Rehab Cardiopulmonary Rehab STENTX1-AMIN    In 2 months [facility] PHASE 2 [address]90 Darius [address] Cardiopulmonary Rehab STENTX1-AMIN    In 2 months [facility] PHASE 2 23799 Darius Nathan Sw Cardiopulmonary Rehab STENTX1-AMIN    In 2 months [facility] PHASE 2 93262 Darius Chavarria Cardiopulmonary Rehab STENTX1-AMIN    In 1 week [facility] PHASE 2 17916 Darius Chavarria Cardiopulmonary Rehab STENTX1-AMIN    In 1 week [facility] PHASE 2 33790 Darius Chavarria Cardiopulmonary Sophie Cardiopulmonary Rehab STENTX1-AMIN    In 1 month [facility] PHASE 2 48539 Darius Nathan Sw Cardiopulmonary Rehab STENTX1-AMIN    In 2 months [facility] PHASE 2 96208 Darius Nathan Sw Cardiopulmonary Rehab STENTX1-AMIN    In 2 months [facility]

## 2017-11-15 ENCOUNTER — CARDPULM VISIT (OUTPATIENT)
Dept: CARDIAC REHAB | Facility: HOSPITAL | Age: 67
End: 2017-11-15
Attending: INTERNAL MEDICINE
Payer: MEDICARE

## 2017-11-15 PROCEDURE — 93798 PHYS/QHP OP CAR RHAB W/ECG: CPT

## 2017-11-17 ENCOUNTER — CARDPULM VISIT (OUTPATIENT)
Dept: CARDIAC REHAB | Facility: HOSPITAL | Age: 67
End: 2017-11-17
Attending: INTERNAL MEDICINE
Payer: MEDICARE

## 2017-11-17 PROCEDURE — 93798 PHYS/QHP OP CAR RHAB W/ECG: CPT

## 2017-11-20 ENCOUNTER — CARDPULM VISIT (OUTPATIENT)
Dept: CARDIAC REHAB | Facility: HOSPITAL | Age: 67
End: 2017-11-20
Attending: INTERNAL MEDICINE
Payer: MEDICARE

## 2017-11-20 PROCEDURE — 93798 PHYS/QHP OP CAR RHAB W/ECG: CPT

## 2017-11-22 ENCOUNTER — CARDPULM VISIT (OUTPATIENT)
Dept: CARDIAC REHAB | Facility: HOSPITAL | Age: 67
End: 2017-11-22
Attending: INTERNAL MEDICINE
Payer: MEDICARE

## 2017-11-22 PROCEDURE — 93798 PHYS/QHP OP CAR RHAB W/ECG: CPT

## 2017-11-27 ENCOUNTER — CARDPULM VISIT (OUTPATIENT)
Dept: CARDIAC REHAB | Facility: HOSPITAL | Age: 67
End: 2017-11-27
Attending: INTERNAL MEDICINE
Payer: MEDICARE

## 2017-11-27 PROCEDURE — 93798 PHYS/QHP OP CAR RHAB W/ECG: CPT

## 2017-11-29 ENCOUNTER — APPOINTMENT (OUTPATIENT)
Dept: CARDIAC REHAB | Facility: HOSPITAL | Age: 67
End: 2017-11-29
Attending: INTERNAL MEDICINE
Payer: MEDICARE

## 2017-12-01 ENCOUNTER — CARDPULM VISIT (OUTPATIENT)
Dept: CARDIAC REHAB | Facility: HOSPITAL | Age: 67
End: 2017-12-01
Attending: INTERNAL MEDICINE
Payer: MEDICARE

## 2017-12-01 PROCEDURE — 93798 PHYS/QHP OP CAR RHAB W/ECG: CPT

## 2017-12-04 ENCOUNTER — CARDPULM VISIT (OUTPATIENT)
Dept: CARDIAC REHAB | Facility: HOSPITAL | Age: 67
End: 2017-12-04
Attending: INTERNAL MEDICINE
Payer: MEDICARE

## 2017-12-04 PROCEDURE — 93798 PHYS/QHP OP CAR RHAB W/ECG: CPT

## 2017-12-06 ENCOUNTER — CARDPULM VISIT (OUTPATIENT)
Dept: CARDIAC REHAB | Facility: HOSPITAL | Age: 67
End: 2017-12-06
Attending: INTERNAL MEDICINE
Payer: MEDICARE

## 2017-12-06 PROCEDURE — 93798 PHYS/QHP OP CAR RHAB W/ECG: CPT

## 2017-12-08 ENCOUNTER — CARDPULM VISIT (OUTPATIENT)
Dept: CARDIAC REHAB | Facility: HOSPITAL | Age: 67
End: 2017-12-08
Attending: INTERNAL MEDICINE
Payer: MEDICARE

## 2017-12-08 PROCEDURE — 93798 PHYS/QHP OP CAR RHAB W/ECG: CPT

## 2017-12-11 ENCOUNTER — PRIOR ORIGINAL RECORDS (OUTPATIENT)
Dept: OTHER | Age: 67
End: 2017-12-11

## 2017-12-11 ENCOUNTER — LAB ENCOUNTER (OUTPATIENT)
Dept: LAB | Facility: HOSPITAL | Age: 67
End: 2017-12-11
Attending: INTERNAL MEDICINE
Payer: MEDICARE

## 2017-12-11 ENCOUNTER — CARDPULM VISIT (OUTPATIENT)
Dept: CARDIAC REHAB | Facility: HOSPITAL | Age: 67
End: 2017-12-11
Attending: INTERNAL MEDICINE
Payer: MEDICARE

## 2017-12-11 DIAGNOSIS — Z00.00 ROUTINE HEALTH MAINTENANCE: ICD-10-CM

## 2017-12-11 DIAGNOSIS — E78.00 PURE HYPERCHOLESTEROLEMIA: Primary | ICD-10-CM

## 2017-12-11 DIAGNOSIS — R73.03 PREDIABETES: ICD-10-CM

## 2017-12-11 PROCEDURE — 84436 ASSAY OF TOTAL THYROXINE: CPT

## 2017-12-11 PROCEDURE — 93798 PHYS/QHP OP CAR RHAB W/ECG: CPT

## 2017-12-11 PROCEDURE — 36415 COLL VENOUS BLD VENIPUNCTURE: CPT

## 2017-12-11 PROCEDURE — 84450 TRANSFERASE (AST) (SGOT): CPT

## 2017-12-11 PROCEDURE — 84460 ALANINE AMINO (ALT) (SGPT): CPT

## 2017-12-11 PROCEDURE — 84480 ASSAY TRIIODOTHYRONINE (T3): CPT

## 2017-12-11 PROCEDURE — 84443 ASSAY THYROID STIM HORMONE: CPT

## 2017-12-11 PROCEDURE — 82550 ASSAY OF CK (CPK): CPT

## 2017-12-11 PROCEDURE — 83036 HEMOGLOBIN GLYCOSYLATED A1C: CPT

## 2017-12-11 PROCEDURE — 84439 ASSAY OF FREE THYROXINE: CPT

## 2017-12-11 PROCEDURE — 84075 ASSAY ALKALINE PHOSPHATASE: CPT

## 2017-12-12 LAB
ALKALINE PHOSPHATATE(ALK PHOS): 71 IU/L
ALT (SGPT): 21 U/L
ALT (SGPT): 21 U/L
AST (SGOT): 21 U/L
AST (SGOT): 21 U/L
CREATININE KINASE: 231 U/L
GLYCOHEMOGLOBIN: 6.2 %
SGOT (AST): 21 IU/L
SGPT (ALT): 21 IU/L
T4(THYROXINE): 9.7 MG/DL
THYROID STIMULATING HORMONE: 0.32 MLU/L
THYROID STIMULATING HORMONE: 0.32 MLU/L

## 2017-12-13 ENCOUNTER — CARDPULM VISIT (OUTPATIENT)
Dept: CARDIAC REHAB | Facility: HOSPITAL | Age: 67
End: 2017-12-13
Attending: INTERNAL MEDICINE
Payer: MEDICARE

## 2017-12-13 PROCEDURE — 93798 PHYS/QHP OP CAR RHAB W/ECG: CPT

## 2017-12-15 ENCOUNTER — CARDPULM VISIT (OUTPATIENT)
Dept: CARDIAC REHAB | Facility: HOSPITAL | Age: 67
End: 2017-12-15
Attending: INTERNAL MEDICINE
Payer: MEDICARE

## 2017-12-15 ENCOUNTER — APPOINTMENT (OUTPATIENT)
Dept: LAB | Facility: HOSPITAL | Age: 67
End: 2017-12-15
Attending: INTERNAL MEDICINE
Payer: MEDICARE

## 2017-12-15 DIAGNOSIS — E78.00 PURE HYPERCHOLESTEROLEMIA: ICD-10-CM

## 2017-12-15 PROCEDURE — 93798 PHYS/QHP OP CAR RHAB W/ECG: CPT

## 2017-12-15 PROCEDURE — 80053 COMPREHEN METABOLIC PANEL: CPT

## 2017-12-15 PROCEDURE — 80061 LIPID PANEL: CPT

## 2017-12-15 PROCEDURE — 36415 COLL VENOUS BLD VENIPUNCTURE: CPT

## 2017-12-16 ENCOUNTER — PRIOR ORIGINAL RECORDS (OUTPATIENT)
Dept: OTHER | Age: 67
End: 2017-12-16

## 2017-12-18 ENCOUNTER — CARDPULM VISIT (OUTPATIENT)
Dept: CARDIAC REHAB | Facility: HOSPITAL | Age: 67
End: 2017-12-18
Attending: INTERNAL MEDICINE
Payer: MEDICARE

## 2017-12-18 LAB
ALBUMIN: 3.9 G/DL
ALBUMIN: 3.9 G/DL
ALT (SGPT): 21 U/L
ALT (SGPT): 21 U/L
AST (SGOT): 22 U/L
AST (SGOT): 22 U/L
BILIRUBIN TOTAL: 1.5 MG/DL
BILIRUBIN TOTAL: 1.5 MG/DL
BUN: 16 MG/DL
BUN: 16 MG/DL
CALCIUM: 9.4 MG/DL
CHLORIDE: 102 MEQ/L
CHLORIDE: 102 MEQ/L
CHOLESTEROL, TOTAL: 133 MG/DL
CHOLESTEROL, TOTAL: 133 MG/DL
CREATININE, SERUM: 0.89 MG/DL
CREATININE, SERUM: 0.89 MG/DL
GLOBULIN: 2.9 G/DL
GLOBULIN: 2.9 G/DL
GLUCOSE: 97 MG/DL
HDL CHOLESTEROL: 36 MG/DL
HDL CHOLESTEROL: 36 MG/DL
LDL CHOLESTEROL: 77 MG/DL
LDL CHOLESTEROL: 77 MG/DL
POTASSIUM, SERUM: 4 MEQ/L
POTASSIUM, SERUM: 4 MEQ/L
PROTEIN, TOTAL: 6.8 G/DL
PROTEIN, TOTAL: 6.8 G/DL
SGOT (AST): 22 IU/L
SGOT (AST): 22 IU/L
SGPT (ALT): 21 IU/L
SGPT (ALT): 21 IU/L
SODIUM: 140 MEQ/L
SODIUM: 140 MEQ/L
TRIGLYCERIDES: 98 MG/DL
TRIGLYCERIDES: 98 MG/DL

## 2017-12-18 PROCEDURE — 93798 PHYS/QHP OP CAR RHAB W/ECG: CPT

## 2017-12-20 ENCOUNTER — CARDPULM VISIT (OUTPATIENT)
Dept: CARDIAC REHAB | Facility: HOSPITAL | Age: 67
End: 2017-12-20
Attending: INTERNAL MEDICINE
Payer: MEDICARE

## 2017-12-20 PROCEDURE — 93798 PHYS/QHP OP CAR RHAB W/ECG: CPT

## 2017-12-22 ENCOUNTER — CARDPULM VISIT (OUTPATIENT)
Dept: CARDIAC REHAB | Facility: HOSPITAL | Age: 67
End: 2017-12-22
Attending: INTERNAL MEDICINE
Payer: MEDICARE

## 2017-12-22 PROCEDURE — 93798 PHYS/QHP OP CAR RHAB W/ECG: CPT

## 2017-12-23 ENCOUNTER — TELEPHONE (OUTPATIENT)
Dept: OTHER | Age: 67
End: 2017-12-23

## 2017-12-23 NOTE — TELEPHONE ENCOUNTER
----- Message from Yolanda Dueñas RN sent at 12/23/2017 10:37 AM CST -----      ----- Message -----  From: aYny Stanley MD  Sent: 12/22/2017  10:06 AM  To: Raven Huntley Lpn/Nikki    Please call pt with information below. Pt has not viewed it on [facility].

## 2017-12-23 NOTE — TELEPHONE ENCOUNTER
LMTCB  Notes Recorded by Yany Stanley MD on 12/13/2017 at 9:21 PM CST  Dear Ms. Shital Pugh,  One of the 2 thyroid tests was slightly abnormal.  The other one was normal.  We can continue to follow these tests since they are only slightly abnormal.  I would

## 2017-12-27 ENCOUNTER — CARDPULM VISIT (OUTPATIENT)
Dept: CARDIAC REHAB | Facility: HOSPITAL | Age: 67
End: 2017-12-27
Attending: INTERNAL MEDICINE
Payer: MEDICARE

## 2017-12-27 PROCEDURE — 93798 PHYS/QHP OP CAR RHAB W/ECG: CPT

## 2017-12-29 ENCOUNTER — CARDPULM VISIT (OUTPATIENT)
Dept: CARDIAC REHAB | Facility: HOSPITAL | Age: 67
End: 2017-12-29
Attending: INTERNAL MEDICINE
Payer: MEDICARE

## 2017-12-29 PROCEDURE — 93798 PHYS/QHP OP CAR RHAB W/ECG: CPT

## 2018-01-03 ENCOUNTER — CARDPULM VISIT (OUTPATIENT)
Dept: CARDIAC REHAB | Facility: HOSPITAL | Age: 68
End: 2018-01-03
Attending: INTERNAL MEDICINE
Payer: MEDICARE

## 2018-01-03 PROCEDURE — 93798 PHYS/QHP OP CAR RHAB W/ECG: CPT

## 2018-01-05 ENCOUNTER — CARDPULM VISIT (OUTPATIENT)
Dept: CARDIAC REHAB | Facility: HOSPITAL | Age: 68
End: 2018-01-05
Attending: INTERNAL MEDICINE
Payer: MEDICARE

## 2018-01-05 PROCEDURE — 93798 PHYS/QHP OP CAR RHAB W/ECG: CPT

## 2018-01-08 ENCOUNTER — CARDPULM VISIT (OUTPATIENT)
Dept: CARDIAC REHAB | Facility: HOSPITAL | Age: 68
End: 2018-01-08
Attending: INTERNAL MEDICINE
Payer: MEDICARE

## 2018-01-08 ENCOUNTER — OFFICE VISIT (OUTPATIENT)
Dept: CARDIOLOGY CLINIC | Facility: CLINIC | Age: 68
End: 2018-01-08

## 2018-01-08 VITALS
RESPIRATION RATE: 12 BRPM | WEIGHT: 168 LBS | BODY MASS INDEX: 29 KG/M2 | SYSTOLIC BLOOD PRESSURE: 132 MMHG | DIASTOLIC BLOOD PRESSURE: 70 MMHG | HEART RATE: 68 BPM

## 2018-01-08 DIAGNOSIS — E78.5 DYSLIPIDEMIA: Primary | ICD-10-CM

## 2018-01-08 DIAGNOSIS — I25.10 CORONARY ARTERY DISEASE INVOLVING NATIVE CORONARY ARTERY OF NATIVE HEART WITHOUT ANGINA PECTORIS: ICD-10-CM

## 2018-01-08 PROCEDURE — 93798 PHYS/QHP OP CAR RHAB W/ECG: CPT

## 2018-01-08 PROCEDURE — G0463 HOSPITAL OUTPT CLINIC VISIT: HCPCS | Performed by: INTERNAL MEDICINE

## 2018-01-08 PROCEDURE — 99214 OFFICE O/P EST MOD 30 MIN: CPT | Performed by: INTERNAL MEDICINE

## 2018-01-08 NOTE — PROGRESS NOTES
Levi Mendoza is a 69year old female. Patient presents with:   Follow - Up    HPI:   The patient underwent stenting of the LAD in mid September 2017 after unstable angina at another hospital leading to her being transferred to [place].  She had 2.5 mm exertion  CARDIOVASCULAR: chest pain  GI: denies abdominal pain and denies heartburn  NEURO: denies headaches    EXAM:   /70   Pulse 68   Resp 12   Wt 168 lb (76.2 kg)   BMI 28.84 kg/m²   GENERAL: well developed, well nourished,in no apparent distres

## 2018-01-10 ENCOUNTER — CARDPULM VISIT (OUTPATIENT)
Dept: CARDIAC REHAB | Facility: HOSPITAL | Age: 68
End: 2018-01-10
Attending: INTERNAL MEDICINE
Payer: MEDICARE

## 2018-01-10 PROCEDURE — 93798 PHYS/QHP OP CAR RHAB W/ECG: CPT

## 2018-01-12 ENCOUNTER — CARDPULM VISIT (OUTPATIENT)
Dept: CARDIAC REHAB | Facility: HOSPITAL | Age: 68
End: 2018-01-12
Attending: INTERNAL MEDICINE
Payer: MEDICARE

## 2018-01-12 DIAGNOSIS — M54.32 SCIATICA OF LEFT SIDE: ICD-10-CM

## 2018-01-12 PROCEDURE — 93798 PHYS/QHP OP CAR RHAB W/ECG: CPT

## 2018-01-13 NOTE — TELEPHONE ENCOUNTER
From: Jelani Finley  Sent: 1/12/2018 4:05 PM CST  Subject: Medication Renewal Request    Jelani Finley would like a refill of the following medications:     gabapentin 300 MG Oral Cap Meghan Sanabria MD]   Patient Comment: I am requesting an increa

## 2018-01-14 RX ORDER — GABAPENTIN 300 MG/1
300 CAPSULE ORAL 4 TIMES DAILY PRN
Qty: 120 CAPSULE | Refills: 3 | Status: SHIPPED
Start: 2018-01-14 | End: 2018-02-05 | Stop reason: DRUGHIGH

## 2018-01-15 ENCOUNTER — CARDPULM VISIT (OUTPATIENT)
Dept: CARDIAC REHAB | Facility: HOSPITAL | Age: 68
End: 2018-01-15
Attending: INTERNAL MEDICINE
Payer: MEDICARE

## 2018-01-15 PROCEDURE — 93798 PHYS/QHP OP CAR RHAB W/ECG: CPT

## 2018-01-16 ENCOUNTER — HOSPITAL ENCOUNTER (OUTPATIENT)
Dept: MAMMOGRAPHY | Facility: HOSPITAL | Age: 68
Discharge: HOME | End: 2018-01-16
Attending: INTERNAL MEDICINE
Payer: MEDICARE

## 2018-01-16 ENCOUNTER — HOSPITAL ENCOUNTER (OUTPATIENT)
Dept: BONE DENSITY | Facility: HOSPITAL | Age: 68
Discharge: HOME | End: 2018-01-16
Attending: INTERNAL MEDICINE
Payer: MEDICARE

## 2018-01-16 DIAGNOSIS — Z00.00 ROUTINE HEALTH MAINTENANCE: ICD-10-CM

## 2018-01-16 DIAGNOSIS — Z12.31 VISIT FOR SCREENING MAMMOGRAM: ICD-10-CM

## 2018-01-16 PROCEDURE — 77067 SCR MAMMO BI INCL CAD: CPT | Performed by: INTERNAL MEDICINE

## 2018-01-16 PROCEDURE — 77080 DXA BONE DENSITY AXIAL: CPT | Performed by: INTERNAL MEDICINE

## 2018-01-17 ENCOUNTER — OFFICE VISIT (OUTPATIENT)
Dept: INTERNAL MEDICINE CLINIC | Facility: CLINIC | Age: 68
End: 2018-01-17

## 2018-01-17 ENCOUNTER — APPOINTMENT (OUTPATIENT)
Dept: CARDIAC REHAB | Facility: HOSPITAL | Age: 68
End: 2018-01-17
Attending: INTERNAL MEDICINE
Payer: MEDICARE

## 2018-01-17 VITALS
BODY MASS INDEX: 28.94 KG/M2 | TEMPERATURE: 99 F | RESPIRATION RATE: 20 BRPM | WEIGHT: 169.5 LBS | DIASTOLIC BLOOD PRESSURE: 78 MMHG | SYSTOLIC BLOOD PRESSURE: 132 MMHG | HEIGHT: 64 IN | HEART RATE: 66 BPM

## 2018-01-17 DIAGNOSIS — R92.8 ABNORMAL MAMMOGRAM: ICD-10-CM

## 2018-01-17 DIAGNOSIS — M85.80 OSTEOPENIA, UNSPECIFIED LOCATION: ICD-10-CM

## 2018-01-17 DIAGNOSIS — M54.32 SCIATICA OF LEFT SIDE: Primary | ICD-10-CM

## 2018-01-17 PROCEDURE — G0463 HOSPITAL OUTPT CLINIC VISIT: HCPCS | Performed by: INTERNAL MEDICINE

## 2018-01-17 PROCEDURE — 99214 OFFICE O/P EST MOD 30 MIN: CPT | Performed by: INTERNAL MEDICINE

## 2018-01-17 RX ORDER — IBUPROFEN 200 MG
200 TABLET ORAL EVERY 6 HOURS PRN
COMMUNITY

## 2018-01-17 RX ORDER — GABAPENTIN 600 MG/1
600 TABLET ORAL 3 TIMES DAILY
Qty: 90 TABLET | Refills: 3 | Status: SHIPPED | OUTPATIENT
Start: 2018-01-17 | End: 2018-05-14 | Stop reason: DRUGHIGH

## 2018-01-17 RX ORDER — IBUPROFEN 200 MG
200 TABLET ORAL
COMMUNITY
End: 2018-01-17 | Stop reason: ALTCHOICE

## 2018-01-17 RX ORDER — PHENOL 1.4 %
AEROSOL, SPRAY (ML) MUCOUS MEMBRANE
Qty: 60 TABLET | Refills: 0 | OUTPATIENT
Start: 2018-01-17

## 2018-01-17 NOTE — ASSESSMENT & PLAN NOTE
Patient has a lot of pain after her cardiac rehab exercising. she will modify her routine and spend more time on the recumbent bike and last time on the treadmill. I also advised her to decrease the hill on the treadmill.   We will try increasing her dose

## 2018-01-17 NOTE — PATIENT INSTRUCTIONS
I recommend that you take calcium carbonate with vitamin D, 600 mg, 1 tab twice a day with food. If the calcium supplements are too large to swallow, chewable calcium supplements are available.  You should also do weight bearing exercise such as walking o

## 2018-01-17 NOTE — PROGRESS NOTES
HPI:    Patient ID: Koko Soares is a 69year old female. The cardiac rehab bothers her back, legs, knees and toes. The walking uphill is bad on her back. The gabapentin doesn't seem to be helping, but they don't make her tired.       Back Pain 120 capsule Rfl: 3   atorvastatin 40 MG Oral Tab Take 1 tablet (40 mg total) by mouth daily. Disp: 90 tablet Rfl: 0   Prasugrel HCl 10 MG Oral Tab Take 1 tablet (10 mg total) by mouth daily.  Disp: 90 tablet Rfl: 2   Pantoprazole Sodium 40 MG Oral Tab EC Ta normal and breath sounds normal. No respiratory distress. Neurological: She is alert and oriented to person, place, and time.    Skin:   Bruises on right leg              ASSESSMENT/PLAN:     Problem List Items Addressed This Visit     Sciatica of left si

## 2018-01-17 NOTE — ASSESSMENT & PLAN NOTE
Discussed her recent mammogram. advised the patient that she needs a diagnostic mammogram.  Provided the order.

## 2018-01-17 NOTE — ASSESSMENT & PLAN NOTE
Counseled regarding need for Calcium with D, 1200 mg in divided doses. Counseled regarding weight-bearing exercise. Counseled regarded consequences of worsening of bone density.

## 2018-01-19 ENCOUNTER — CARDPULM VISIT (OUTPATIENT)
Dept: CARDIAC REHAB | Facility: HOSPITAL | Age: 68
End: 2018-01-19
Attending: INTERNAL MEDICINE
Payer: MEDICARE

## 2018-01-19 PROCEDURE — 93798 PHYS/QHP OP CAR RHAB W/ECG: CPT

## 2018-01-22 ENCOUNTER — CARDPULM VISIT (OUTPATIENT)
Dept: CARDIAC REHAB | Facility: HOSPITAL | Age: 68
End: 2018-01-22
Attending: INTERNAL MEDICINE
Payer: MEDICARE

## 2018-01-22 PROCEDURE — 93798 PHYS/QHP OP CAR RHAB W/ECG: CPT

## 2018-01-24 ENCOUNTER — CARDPULM VISIT (OUTPATIENT)
Dept: CARDIAC REHAB | Facility: HOSPITAL | Age: 68
End: 2018-01-24
Attending: INTERNAL MEDICINE
Payer: MEDICARE

## 2018-01-24 PROCEDURE — 93798 PHYS/QHP OP CAR RHAB W/ECG: CPT

## 2018-01-26 ENCOUNTER — CARDPULM VISIT (OUTPATIENT)
Dept: CARDIAC REHAB | Facility: HOSPITAL | Age: 68
End: 2018-01-26
Attending: INTERNAL MEDICINE
Payer: MEDICARE

## 2018-01-26 PROCEDURE — 93798 PHYS/QHP OP CAR RHAB W/ECG: CPT

## 2018-01-29 ENCOUNTER — CARDPULM VISIT (OUTPATIENT)
Dept: CARDIAC REHAB | Facility: HOSPITAL | Age: 68
End: 2018-01-29
Attending: INTERNAL MEDICINE
Payer: MEDICARE

## 2018-01-29 PROCEDURE — 93798 PHYS/QHP OP CAR RHAB W/ECG: CPT

## 2018-01-31 ENCOUNTER — CARDPULM VISIT (OUTPATIENT)
Dept: CARDIAC REHAB | Facility: HOSPITAL | Age: 68
End: 2018-01-31
Attending: INTERNAL MEDICINE
Payer: MEDICARE

## 2018-01-31 PROCEDURE — 93798 PHYS/QHP OP CAR RHAB W/ECG: CPT

## 2018-02-05 ENCOUNTER — TELEPHONE (OUTPATIENT)
Dept: NEUROLOGY | Facility: CLINIC | Age: 68
End: 2018-02-05

## 2018-02-05 ENCOUNTER — OFFICE VISIT (OUTPATIENT)
Dept: NEUROLOGY | Facility: CLINIC | Age: 68
End: 2018-02-05

## 2018-02-05 VITALS
DIASTOLIC BLOOD PRESSURE: 68 MMHG | HEART RATE: 68 BPM | BODY MASS INDEX: 29 KG/M2 | WEIGHT: 167 LBS | SYSTOLIC BLOOD PRESSURE: 130 MMHG | RESPIRATION RATE: 15 BRPM

## 2018-02-05 DIAGNOSIS — M48.062 SPINAL STENOSIS OF LUMBAR REGION WITH NEUROGENIC CLAUDICATION: Primary | ICD-10-CM

## 2018-02-05 DIAGNOSIS — M54.16 LUMBAR RADICULOPATHY: ICD-10-CM

## 2018-02-05 DIAGNOSIS — M96.1 LUMBAR POST-LAMINECTOMY SYNDROME: ICD-10-CM

## 2018-02-05 DIAGNOSIS — M51.9 LUMBAR DISC DISEASE: ICD-10-CM

## 2018-02-05 DIAGNOSIS — M43.16 SPONDYLOLISTHESIS OF LUMBAR REGION: ICD-10-CM

## 2018-02-05 DIAGNOSIS — M48.061 LUMBAR FORAMINAL STENOSIS: ICD-10-CM

## 2018-02-05 DIAGNOSIS — M51.26 LUMBAR HERNIATED DISC: ICD-10-CM

## 2018-02-05 DIAGNOSIS — M48.062 SPINAL STENOSIS OF LUMBAR REGION WITH NEUROGENIC CLAUDICATION: ICD-10-CM

## 2018-02-05 DIAGNOSIS — M54.16 LUMBAR RADICULOPATHY: Primary | ICD-10-CM

## 2018-02-05 PROCEDURE — 99214 OFFICE O/P EST MOD 30 MIN: CPT | Performed by: PHYSICAL MEDICINE & REHABILITATION

## 2018-02-05 RX ORDER — DOCUSATE SODIUM 100 MG/1
100 CAPSULE, LIQUID FILLED ORAL 2 TIMES DAILY PRN
COMMUNITY
End: 2021-01-18

## 2018-02-05 RX ORDER — MULTIVITAMIN
1 TABLET ORAL DAILY
COMMUNITY

## 2018-02-05 NOTE — PATIENT INSTRUCTIONS
As of October 6th 2014, the Drug Enforcement Agency [facility]) is reclassifying all hydrocodone combination medications from Schedule III to Schedule II. This includes medications such as Norco, Vicodin, Lortab, Zohydro, and Vicoprofen.     What this means for y will perform left L4 and L5 TFESI(s). She will continue with the Neurontin for now. If it continues to make her feel sick after another week, then I will need to decrease the dose.     The patient will continue with her home exercise program.    She keeley

## 2018-02-05 NOTE — TELEPHONE ENCOUNTER
Called [facility] for authorization of approval of left L4 & L5 TFESIs cpt codes [id number], [id number], [id number]. Talked to Layo Hall.       who states no authorization is required. Reference #  Will inform Nursing.

## 2018-02-05 NOTE — PROGRESS NOTES
Low Back Pain H & P    Chief Complaint: Patient presents with:  Low Back Pain: pt here for follow up with c/o lower back pain radiating in the left leg with numbness and tingling in the lower left extremity.  completed cardiac rehab. pain increases with sta • Cancer Mother      pt. cannot recall type of cancer   • Cancer Sister      pt.cannot recall type of cancer        Social History     Social History  Social History   Marital status: Single  Spouse name: N/A    Years of education: N/A  Number of childre Intact in bilateral LE except: Increased in the arch of the LEFT foot  lateral LEFT foot  first dorsal web space of the LEFT foot  Decreased in the  lateral  medial Left thigh  lateral  medial  Left Lower Leg   LE Muscle Strength:  All LE strength measurem

## 2018-02-06 NOTE — TELEPHONE ENCOUNTER
Patient informed of the below message from Dr. Daniella Turner verbalizing understanding and would like to start physical therapy at [facility]. Patient given the number to contact them ([phone]) and notified Dr. Daniella Turner will place the order.

## 2018-02-06 NOTE — TELEPHONE ENCOUNTER
I spoke to Dr. Addie Umaña and he stated that she is not able to have the injection until mid March. She will need to continue with her HEP and if she would l inder, I can have her start PT at [facility] until then.

## 2018-02-12 RX ORDER — ATORVASTATIN CALCIUM 40 MG/1
TABLET, FILM COATED ORAL
Qty: 90 TABLET | Refills: 0 | Status: SHIPPED | OUTPATIENT
Start: 2018-02-12 | End: 2018-05-14

## 2018-02-12 NOTE — TELEPHONE ENCOUNTER
Cholesterol Medications  Protocol Criteria:  · Appointment scheduled in the past 12 months or in the next 3 months  · ALT & LDL on file in the past 12 months  · ALT result < 80  · LDL result <130   Recent Outpatient Visits            1 week ago L3-4 mod-se

## 2018-02-22 ENCOUNTER — OFFICE VISIT (OUTPATIENT)
Dept: PHYSICAL THERAPY | Facility: HOSPITAL | Age: 68
End: 2018-02-22
Attending: PHYSICAL MEDICINE & REHABILITATION
Payer: MEDICARE

## 2018-02-22 NOTE — PROGRESS NOTES
LUMBAR SPINE EVALUATION:   Referring Physician: Dr. Helen Pelaez  Diagnosis: Lumbar radiculopathy (M54.16)  Lumbar post-laminectomy syndrome (M96.1)  Lumbar foraminal stenosis (M99.83)  Lumbar disc disease (M51.9)  Lumbar herniated disc (M51.26)  Fatemehu Chiquis Miss Mara Saenz presents to therapy with a long hx of LBP and L LE pain. She denies R LE pain but reports she did have R LE symptoms prior to a lumbar spine surgery in the 1990's  She displays significant scoliosis curvature with multiple muscle imbalances. issued a HEP for prone lying and arm/leg lengthener L  Charges: PT Eval x 1, TE1, TA1    Total Timed treatment: 45 min      Total Treatment Time: 45 min        PLAN OF CARE:    Goals:    1. Centralization of symptoms to the lumbar spine.   2.  The pt will

## 2018-03-02 ENCOUNTER — OFFICE VISIT (OUTPATIENT)
Dept: PHYSICAL THERAPY | Facility: HOSPITAL | Age: 68
End: 2018-03-02
Attending: PHYSICAL MEDICINE & REHABILITATION
Payer: MEDICARE

## 2018-03-02 PROCEDURE — 97140 MANUAL THERAPY 1/> REGIONS: CPT | Performed by: PHYSICAL THERAPIST

## 2018-03-02 PROCEDURE — 97110 THERAPEUTIC EXERCISES: CPT | Performed by: PHYSICAL THERAPIST

## 2018-03-02 NOTE — PROGRESS NOTES
3/2/2018  Dx: Lumbar radiculopathy (M54.16)  Lumbar post-laminectomy syndrome (M96.1)  Lumbar foraminal stenosis (M99.83)  Lumbar disc disease (M51.9)  Lumbar herniated disc (M51.26)  Spondylolisthesis of lumbar region (M43.16)  Spinal stenosis of lumbar r in pain. 5.  The pt will be independent in her HEP. Frequency / Duration: Patient will be seen for 1-2 x/week or a total of 10 visits over a 90 day period. Treatment will include: Manual Therapy; Therapeutic Exercises; Neuromuscular Re-education;  Ther burning pain with walking - gets burning pain. Wakes up at 1-2 am secondary to pain. Has to take a pain pill prior to falling asleep. Pain goes into the toes. 8/10 VAS currently (with pain meds). Does get up to 10/10 VAS.       Current functional limit RFIL increased L LE pain, prone lying abolished L LE pain      STRENGTH:   5/5 MMT Scale   Left  Right Comments   Hip Flexion (L2)  4 4    Knee Extension (L3) 5 5    Knee Flexion 4 4    Ankle DF (L4) 5 5    EHL (L5) 5 5    Ankle PF (S1) 4- 5    Hip Abducti

## 2018-03-08 ENCOUNTER — OFFICE VISIT (OUTPATIENT)
Dept: PHYSICAL THERAPY | Facility: HOSPITAL | Age: 68
End: 2018-03-08
Attending: PHYSICAL MEDICINE & REHABILITATION
Payer: MEDICARE

## 2018-03-08 PROCEDURE — 97530 THERAPEUTIC ACTIVITIES: CPT | Performed by: PHYSICAL THERAPIST

## 2018-03-08 PROCEDURE — 97140 MANUAL THERAPY 1/> REGIONS: CPT | Performed by: PHYSICAL THERAPIST

## 2018-03-08 PROCEDURE — 97110 THERAPEUTIC EXERCISES: CPT | Performed by: PHYSICAL THERAPIST

## 2018-03-08 NOTE — PROGRESS NOTES
3/8/2018    Dx: Lumbar radiculopathy (M54.16)  Lumbar post-laminectomy syndrome (M96.1)  Lumbar foraminal stenosis (M99.83)  Lumbar disc disease (M51.9)  Lumbar herniated disc (M51.26)  Spondylolisthesis of lumbar region (M43.16)  Spinal stenosis of lumbar complete a modified rolling skate without an increase in pain. 5.  The pt will be independent in her HEP. Frequency / Duration: Patient will be seen for 1-2 x/week or a total of 10 visits over a 90 day period.   Treatment will include: Manual Therapy; T intermittent pain at first, then became constant. Does get some burning pain with walking - gets burning pain. Wakes up at 1-2 am secondary to pain. Has to take a pain pill prior to falling asleep. Pain goes into the toes.   8/10 VAS currently (with terrance WFL    L Sideglide Decreased 50%      Repeated Motion Testing: RFIL increased L LE pain, prone lying abolished L LE pain      STRENGTH:   5/5 MMT Scale   Left  Right Comments   Hip Flexion (L2)  4 4    Knee Extension (L3) 5 5    Knee Flexion 4 4    Ankle D

## 2018-03-09 RX ORDER — PANTOPRAZOLE SODIUM 40 MG/1
40 TABLET, DELAYED RELEASE ORAL
Qty: 90 TABLET | Refills: 0 | Status: SHIPPED | OUTPATIENT
Start: 2018-03-09 | End: 2018-06-16

## 2018-03-09 NOTE — TELEPHONE ENCOUNTER
Medication refilled for 90 days per protocol.  Pantoprazole    Refill Protocol Appointment Criteria  · Appointment scheduled in the past 12 months or in the next 3 months  Recent Outpatient Visits            [place]

## 2018-03-12 ENCOUNTER — TELEPHONE (OUTPATIENT)
Dept: PHYSICAL THERAPY | Facility: HOSPITAL | Age: 68
End: 2018-03-12

## 2018-03-12 ENCOUNTER — APPOINTMENT (OUTPATIENT)
Dept: PHYSICAL THERAPY | Facility: HOSPITAL | Age: 68
End: 2018-03-12
Attending: PHYSICAL MEDICINE & REHABILITATION
Payer: MEDICARE

## 2018-03-12 NOTE — TELEPHONE ENCOUNTER
----- Message from Jj Mustafa sent at 3/12/2018  1:45 PM CDT -----  Regarding: cxling appt today  She left a vm that she is in so much pain she will not be coming to therapy today. Left message for patient to discuss her symptoms.   Asked for a return

## 2018-03-16 ENCOUNTER — OFFICE VISIT (OUTPATIENT)
Dept: PHYSICAL THERAPY | Facility: HOSPITAL | Age: 68
End: 2018-03-16
Attending: PHYSICAL MEDICINE & REHABILITATION
Payer: MEDICARE

## 2018-03-16 PROCEDURE — 97140 MANUAL THERAPY 1/> REGIONS: CPT | Performed by: PHYSICAL THERAPIST

## 2018-03-16 PROCEDURE — 97530 THERAPEUTIC ACTIVITIES: CPT | Performed by: PHYSICAL THERAPIST

## 2018-03-16 NOTE — PROGRESS NOTES
3/16/2018    Dx: Lumbar radiculopathy (M54.16)  Lumbar post-laminectomy syndrome (M96.1)  Lumbar foraminal stenosis (M99.83)  Lumbar disc disease (M51.9)  Lumbar herniated disc (M51.26)  Spondylolisthesis of lumbar region (M43.16)  Spinal stenosis of lumba attention. The pt reported understanding. Note strength 5/5 throughout the LE's except 4/5 L DF, 4/5 R great toe, PF NT. Goals:    1. Centralization of symptoms to the lumbar spine.   2.  The pt will be independent in proper ergonomic principles to dec Reports that has been that way for 4-5 years. Had stop all exercises prior to cardiac rehab. Reports her mobility has improved but the pain persists. Did TM, bikes, weights.   Now does a little bit of [place] and another exercise class an TM/bikes at least OBJECTIVE:   Observation/Posture: scoliosis curvature with SB L and flatten of the lumbar lordosis  Gait: decreased use of hip extensors, decreased arm swing  ROM:     Trunk         Pain (+/-)   Flexion WFL     Finger tips to top of ankles   Extension Limi

## 2018-03-19 ENCOUNTER — APPOINTMENT (OUTPATIENT)
Dept: PHYSICAL THERAPY | Facility: HOSPITAL | Age: 68
End: 2018-03-19
Attending: PHYSICAL MEDICINE & REHABILITATION
Payer: MEDICARE

## 2018-03-20 ENCOUNTER — TELEPHONE (OUTPATIENT)
Dept: CARDIOLOGY CLINIC | Facility: CLINIC | Age: 68
End: 2018-03-20

## 2018-03-20 ENCOUNTER — TELEPHONE (OUTPATIENT)
Dept: NEUROLOGY | Facility: CLINIC | Age: 68
End: 2018-03-20

## 2018-03-20 NOTE — TELEPHONE ENCOUNTER
Looks like Dr Enio Guo would like to perform the following injection on pt: left L4 and L5 TFESIs        Dr Bowman's note back in Feb stated: I spoke to Dr. Ty Dahl and he stated that she is not able to have the injection until mid March.   She will need to contin

## 2018-03-20 NOTE — TELEPHONE ENCOUNTER
Pt states that she needs to have injection in back and needs to know how many days she should be off blood thinner prior to injection. Dr. Monster Brooke office said that she could have injection done this month but she needs to make sure OK with Dr. Moises Ormond.   Lex

## 2018-03-20 NOTE — TELEPHONE ENCOUNTER
Spoke to patient who would like to proceed with recommended left L4 and L5 TFESIs. Reviewed Dr. Lozano File note on 2/5/18 that stated per Dr. Patricia Causey patient can proceed with lumbar injections mid March.    Patient states she will contact  and call back t

## 2018-03-20 NOTE — TELEPHONE ENCOUNTER
According to last note by Dr. Adam Oats may hold Effient for procedure mid march 2018.  May hold med for 5 days then resume asap after

## 2018-03-23 ENCOUNTER — HOSPITAL ENCOUNTER (OUTPATIENT)
Dept: MAMMOGRAPHY | Facility: HOSPITAL | Age: 68
Discharge: HOME | End: 2018-03-23
Attending: INTERNAL MEDICINE
Payer: MEDICARE

## 2018-03-23 ENCOUNTER — HOSPITAL ENCOUNTER (OUTPATIENT)
Dept: ULTRASOUND IMAGING | Facility: HOSPITAL | Age: 68
Discharge: HOME | End: 2018-03-23
Attending: INTERNAL MEDICINE
Payer: MEDICARE

## 2018-03-23 ENCOUNTER — APPOINTMENT (OUTPATIENT)
Dept: PHYSICAL THERAPY | Facility: HOSPITAL | Age: 68
End: 2018-03-23
Attending: PHYSICAL MEDICINE & REHABILITATION
Payer: MEDICARE

## 2018-03-23 DIAGNOSIS — R92.8 ABNORMAL MAMMOGRAM: ICD-10-CM

## 2018-03-23 PROCEDURE — 76642 ULTRASOUND BREAST LIMITED: CPT | Performed by: INTERNAL MEDICINE

## 2018-03-23 PROCEDURE — 77065 DX MAMMO INCL CAD UNI: CPT | Performed by: INTERNAL MEDICINE

## 2018-03-23 NOTE — TELEPHONE ENCOUNTER
Patient has been scheduled for a left L4 and L5 TFESIs  on 4/10/18 at the [facility]. Medications and allergies reviewed. Patient informed to hold aspirins, nsaids, blood thinners, vitamins and fish oils 3-7 days prior to procedure.  Patient informed we will need

## 2018-04-02 ENCOUNTER — APPOINTMENT (OUTPATIENT)
Dept: PHYSICAL THERAPY | Facility: HOSPITAL | Age: 68
End: 2018-04-02
Attending: PHYSICAL MEDICINE & REHABILITATION
Payer: MEDICARE

## 2018-04-09 ENCOUNTER — APPOINTMENT (OUTPATIENT)
Dept: PHYSICAL THERAPY | Facility: HOSPITAL | Age: 68
End: 2018-04-09
Attending: PHYSICAL MEDICINE & REHABILITATION
Payer: MEDICARE

## 2018-04-10 ENCOUNTER — OFFICE VISIT (OUTPATIENT)
Dept: SURGERY | Facility: CLINIC | Age: 68
End: 2018-04-10

## 2018-04-10 DIAGNOSIS — M48.061 LUMBAR FORAMINAL STENOSIS: ICD-10-CM

## 2018-04-10 DIAGNOSIS — M51.9 LUMBAR DISC DISEASE: ICD-10-CM

## 2018-04-10 DIAGNOSIS — M54.16 LUMBAR RADICULOPATHY: Primary | ICD-10-CM

## 2018-04-10 DIAGNOSIS — M48.062 SPINAL STENOSIS OF LUMBAR REGION WITH NEUROGENIC CLAUDICATION: ICD-10-CM

## 2018-04-10 PROCEDURE — 64484 NJX AA&/STRD TFRM EPI L/S EA: CPT | Performed by: PHYSICAL MEDICINE & REHABILITATION

## 2018-04-10 PROCEDURE — 64483 NJX AA&/STRD TFRM EPI L/S 1: CPT | Performed by: PHYSICAL MEDICINE & REHABILITATION

## 2018-04-10 NOTE — PROCEDURES
[facility] [address]    2-LEVEL LUMBAR TRANSFORAMINAL   NAME:  Shola Dang    MR #:    [id number] :  1950     PHYSICIAN:  Melonie Bowman        Operative Report    DATE OF PROCEDURE: 4/10/2018   PREOPERATIVE DIAGNOSES: 1. left L4 Then, 5 inch, 22-gauge spinal needles were inserted and directed towards the left L4-5 and left L5-S1 intervertebral foramen.   When they felt to be in good position, AP fluoroscopy was used to advance the needles to the 6 o'clock position on the left L4 an

## 2018-04-16 ENCOUNTER — APPOINTMENT (OUTPATIENT)
Dept: PHYSICAL THERAPY | Facility: HOSPITAL | Age: 68
End: 2018-04-16
Attending: PHYSICAL MEDICINE & REHABILITATION
Payer: MEDICARE

## 2018-04-23 ENCOUNTER — APPOINTMENT (OUTPATIENT)
Dept: PHYSICAL THERAPY | Facility: HOSPITAL | Age: 68
End: 2018-04-23
Attending: PHYSICAL MEDICINE & REHABILITATION
Payer: MEDICARE

## 2018-05-14 ENCOUNTER — OFFICE VISIT (OUTPATIENT)
Dept: NEUROLOGY | Facility: CLINIC | Age: 68
End: 2018-05-14

## 2018-05-14 VITALS — HEART RATE: 84 BPM | RESPIRATION RATE: 15 BRPM | DIASTOLIC BLOOD PRESSURE: 68 MMHG | SYSTOLIC BLOOD PRESSURE: 122 MMHG

## 2018-05-14 DIAGNOSIS — M54.16 LUMBAR RADICULOPATHY: ICD-10-CM

## 2018-05-14 DIAGNOSIS — M51.26 LUMBAR HERNIATED DISC: ICD-10-CM

## 2018-05-14 DIAGNOSIS — M51.9 LUMBAR DISC DISEASE: ICD-10-CM

## 2018-05-14 DIAGNOSIS — M43.16 SPONDYLOLISTHESIS OF LUMBAR REGION: ICD-10-CM

## 2018-05-14 DIAGNOSIS — M48.061 LUMBAR FORAMINAL STENOSIS: ICD-10-CM

## 2018-05-14 DIAGNOSIS — M48.062 SPINAL STENOSIS OF LUMBAR REGION WITH NEUROGENIC CLAUDICATION: Primary | ICD-10-CM

## 2018-05-14 DIAGNOSIS — M96.1 LUMBAR POST-LAMINECTOMY SYNDROME: ICD-10-CM

## 2018-05-14 PROCEDURE — 99214 OFFICE O/P EST MOD 30 MIN: CPT | Performed by: PHYSICAL MEDICINE & REHABILITATION

## 2018-05-14 RX ORDER — GABAPENTIN 300 MG/1
2 CAPSULE ORAL 3 TIMES DAILY
Refills: 3 | COMMUNITY
Start: 2018-04-07 | End: 2018-07-03

## 2018-05-14 RX ORDER — ATORVASTATIN CALCIUM 40 MG/1
TABLET, FILM COATED ORAL
Qty: 90 TABLET | Refills: 0 | Status: SHIPPED | OUTPATIENT
Start: 2018-05-14 | End: 2018-08-13

## 2018-05-14 NOTE — PATIENT INSTRUCTIONS
As of October 6th 2014, the Drug Enforcement Agency [facility]) is reclassifying all hydrocodone combination medications from Schedule III to Schedule II. This includes medications such as Norco, Vicodin, Lortab, Zohydro, and Vicoprofen.     What this means for y will see Dr. Cheyenne Lu again to see if surgery would be a good option for her sinceshe has failed PT and the injections helped for 2 days only. If surgery is not an option for her, then I will try left L3 and L4 TFESI's.     If the injections do not help he

## 2018-05-14 NOTE — PROGRESS NOTES
Low Back Pain H & P    Chief Complaint: Patient presents with:  Low Back Pain: pt here for follow up after Left L4 and L5 transforaminal epidural steroid injection on 4/10/18 with no relief in symptoms.  pain is severe in the lower back radiating down the l pt.cannot recall type of cancer        Social History     Social History  Social History   Marital status: Single  Spouse name: N/A    Years of education: N/A  Number of children: N/A     Occupational History  None on file     Social History Main Topics of the LEFT foot  Decreased in the  lateral  medial Left thigh   LE Muscle Strength:  All LE strength measurements 5/5 except:  Hamstring RIGHT:   4-/5  Hamstring LEFT:   3/5   RIGHT plantar reflexes: downward response   LEFT plantar reflexes: downward resp

## 2018-05-14 NOTE — TELEPHONE ENCOUNTER
Cholesterol Medication Protocol Passed5/14 1:45 AM   ALT in past 12 months    LDL in past 12 months    Last ALT < 80    Last LDL < 130    Appointment in past 12 or next 3 months     Medication refilled for 90 days per protocol.

## 2018-05-29 ENCOUNTER — TELEPHONE (OUTPATIENT)
Dept: CARDIOLOGY CLINIC | Facility: CLINIC | Age: 68
End: 2018-05-29

## 2018-05-29 NOTE — IMAGING NOTE
RN spoke with patient regarding procedure. Reviewed meds and she will call Dr Angie Olivera regarding holding med. She was not prepared for a  so she will cancel her 6/1 appointment and reschedule. RN called scheduling to cancel. . Stated some anxiety, RN sugg

## 2018-05-29 NOTE — TELEPHONE ENCOUNTER
Pt wanted to know if she is ok to Stop taking mediation 5-7 days prior to Cervical Myelophthy. Pls call. Thank you.

## 2018-05-29 NOTE — TELEPHONE ENCOUNTER
LOV 1/08/018 LB follow up schedule for late July    Asking if it is okay to be off 'medicine' prior to cervical procedure. Prasugrel HCL 10 mg daily, asa 81 mg. ARMAND to LAD 9/2017    Reviewed with AP[facility] RH who stated she needs to stay on. Called patient and discussed APN RH recommendation and  reasoning why she needs to stay on.

## 2018-06-01 ENCOUNTER — HOSPITAL ENCOUNTER (OUTPATIENT)
Dept: GENERAL RADIOLOGY | Facility: HOSPITAL | Age: 68
End: 2018-06-01
Attending: NEUROLOGICAL SURGERY
Payer: MEDICARE

## 2018-06-01 ENCOUNTER — HOSPITAL ENCOUNTER (OUTPATIENT)
Dept: CT IMAGING | Facility: HOSPITAL | Age: 68
Discharge: HOME | End: 2018-06-01
Attending: NEUROLOGICAL SURGERY
Payer: MEDICARE

## 2018-06-13 DIAGNOSIS — M54.32 SCIATICA OF LEFT SIDE: ICD-10-CM

## 2018-06-15 RX ORDER — GABAPENTIN 300 MG/1
300 CAPSULE ORAL 4 TIMES DAILY PRN
Qty: 120 CAPSULE | Refills: 3 | OUTPATIENT
Start: 2018-06-15

## 2018-06-16 RX ORDER — PANTOPRAZOLE SODIUM 40 MG/1
40 TABLET, DELAYED RELEASE ORAL
Qty: 90 TABLET | Refills: 0 | Status: SHIPPED | OUTPATIENT
Start: 2018-06-16 | End: 2018-07-03

## 2018-06-16 NOTE — TELEPHONE ENCOUNTER
Refill Protocol Appointment Criteria  · Appointment scheduled in the past 12 months or in the next 3 months  Recent Outpatient Visits            1 month ago L3-4 mod-severe, L2-3 mod-severe, L1-2 mod central stenosis    CASPER Lowe, 12

## 2018-07-03 ENCOUNTER — OFFICE VISIT (OUTPATIENT)
Dept: INTERNAL MEDICINE CLINIC | Facility: CLINIC | Age: 68
End: 2018-07-03

## 2018-07-03 ENCOUNTER — LAB ENCOUNTER (OUTPATIENT)
Dept: LAB | Facility: HOSPITAL | Age: 68
End: 2018-07-03
Attending: INTERNAL MEDICINE
Payer: MEDICARE

## 2018-07-03 VITALS
HEART RATE: 56 BPM | HEIGHT: 64 IN | SYSTOLIC BLOOD PRESSURE: 122 MMHG | DIASTOLIC BLOOD PRESSURE: 78 MMHG | BODY MASS INDEX: 27.23 KG/M2 | WEIGHT: 159.5 LBS

## 2018-07-03 DIAGNOSIS — I25.10 CORONARY ARTERY DISEASE INVOLVING NATIVE CORONARY ARTERY OF NATIVE HEART WITHOUT ANGINA PECTORIS: ICD-10-CM

## 2018-07-03 DIAGNOSIS — R73.03 PREDIABETES: ICD-10-CM

## 2018-07-03 DIAGNOSIS — Z12.11 SCREEN FOR COLON CANCER: ICD-10-CM

## 2018-07-03 DIAGNOSIS — M54.32 SCIATICA OF LEFT SIDE: Primary | ICD-10-CM

## 2018-07-03 DIAGNOSIS — M51.9 LUMBAR DISC DISEASE: ICD-10-CM

## 2018-07-03 DIAGNOSIS — R79.89 ABNORMAL THYROID BLOOD TEST: ICD-10-CM

## 2018-07-03 DIAGNOSIS — E05.90 HYPERTHYROIDISM: ICD-10-CM

## 2018-07-03 DIAGNOSIS — E03.9 HYPOTHYROIDISM: Primary | ICD-10-CM

## 2018-07-03 LAB
T3 SERPL-MCNC: 1.13 NG/ML (ref 0.87–1.78)
T4 FREE SERPL-MCNC: 0.99 NG/DL (ref 0.58–1.64)
TSH SERPL-ACNC: 0.28 UIU/ML (ref 0.45–5.33)

## 2018-07-03 PROCEDURE — 84480 ASSAY TRIIODOTHYRONINE (T3): CPT

## 2018-07-03 PROCEDURE — 83036 HEMOGLOBIN GLYCOSYLATED A1C: CPT

## 2018-07-03 PROCEDURE — 99214 OFFICE O/P EST MOD 30 MIN: CPT | Performed by: INTERNAL MEDICINE

## 2018-07-03 PROCEDURE — G0463 HOSPITAL OUTPT CLINIC VISIT: HCPCS | Performed by: INTERNAL MEDICINE

## 2018-07-03 PROCEDURE — 84439 ASSAY OF FREE THYROXINE: CPT

## 2018-07-03 PROCEDURE — 36415 COLL VENOUS BLD VENIPUNCTURE: CPT

## 2018-07-03 PROCEDURE — 84443 ASSAY THYROID STIM HORMONE: CPT

## 2018-07-03 RX ORDER — GABAPENTIN 300 MG/1
600 CAPSULE ORAL 3 TIMES DAILY
Qty: 60 CAPSULE | Refills: 1 | Status: SHIPPED | OUTPATIENT
Start: 2018-07-03 | End: 2018-09-13

## 2018-07-03 RX ORDER — TRAMADOL HYDROCHLORIDE 50 MG/1
50 TABLET ORAL EVERY 6 HOURS PRN
Qty: 180 TABLET | Refills: 1 | Status: SHIPPED | OUTPATIENT
Start: 2018-07-03 | End: 2019-05-10

## 2018-07-03 RX ORDER — PANTOPRAZOLE SODIUM 40 MG/1
40 TABLET, DELAYED RELEASE ORAL
Qty: 90 TABLET | Refills: 0 | Status: SHIPPED | OUTPATIENT
Start: 2018-07-03 | End: 2018-12-13

## 2018-07-03 RX ORDER — CYCLOBENZAPRINE HCL 10 MG
10 TABLET ORAL AS NEEDED
Qty: 60 TABLET | Refills: 1 | Status: SHIPPED | OUTPATIENT
Start: 2018-07-03 | End: 2018-07-11

## 2018-07-03 NOTE — PROGRESS NOTES
HPI:    Patient ID: Jazzmine Joshua is a 77year old female. Her back and leg pain is not any better and the injections didn't help. She would like surgery.   She will see Dr. Bret Nicholson on July 22 to find out if she can have a myelogram which would involve MG Oral Tab EC Take 1 tablet (40 mg total) by mouth every morning before breakfast. Disp: 90 tablet Rfl: 0   TraMADol HCl 50 MG Oral Tab Take 1 tablet (50 mg total) by mouth every 6 (six) hours as needed.  Disp: 180 tablet Rfl: 1   ATORVASTATIN 40 MG Oral T and EOM are normal.   Cardiovascular: Normal rate, regular rhythm and normal heart sounds. Pulmonary/Chest: Effort normal and breath sounds normal. No respiratory distress. Neurological: She is alert and oriented to person, place, and time. Oral Cap 60 capsule 1      Sig: Take 2 capsules (600 mg total) by mouth 3 (three) times daily. Cyclobenzaprine HCl 10 MG Oral Tab 60 tablet 1      Sig: Take 1 tablet (10 mg total) by mouth as needed for Muscle spasms.       Pantoprazole Sodium 40 MG Or

## 2018-07-04 LAB — HBA1C MFR BLD: 6.1 % (ref 4–6)

## 2018-07-04 NOTE — ASSESSMENT & PLAN NOTE
The patient has severe low back pain with sciatica which has not been relieved with injections or therapy. She is considering surgery and saw Dr. Marcelino Person.   She was told she needed a myelogram and that to do the myelogram she would need to go off of her blo

## 2018-07-05 ENCOUNTER — TELEPHONE (OUTPATIENT)
Dept: INTERNAL MEDICINE CLINIC | Facility: CLINIC | Age: 68
End: 2018-07-05

## 2018-07-05 DIAGNOSIS — M54.32 SCIATICA OF LEFT SIDE: ICD-10-CM

## 2018-07-06 NOTE — TELEPHONE ENCOUNTER
PA for Cyclobenzaprine HCl 10 mg tab completed with [employer] via [address] [place] [address]. Plan states the preferred product is Tizanidine 4 mg tab.

## 2018-07-11 RX ORDER — CYCLOBENZAPRINE HCL 10 MG
10 TABLET ORAL AS NEEDED
Qty: 60 TABLET | Refills: 1 | Status: SHIPPED | OUTPATIENT
Start: 2018-07-11 | End: 2019-06-01

## 2018-07-11 NOTE — TELEPHONE ENCOUNTER
Spoke with patient and informed of the denial for Cyclobenzaprine. Advised of the alternative and patient does not want to take it. Patient states she is going to pay OOP for the Cyclobenzaprine using a coupon card.  Patient also wanted to let Dr. Walter Walter

## 2018-07-11 NOTE — TELEPHONE ENCOUNTER
Please notify the pt and ask if she wants to try the preferred medication or if she wants to pay out of pocket for the cyclobenzaprine.

## 2018-07-14 ENCOUNTER — TELEPHONE (OUTPATIENT)
Dept: INTERNAL MEDICINE CLINIC | Facility: CLINIC | Age: 68
End: 2018-07-14

## 2018-07-14 NOTE — TELEPHONE ENCOUNTER
Call pt:   Your thyroid is still slightly overactive.  This is very mild, but persistent.  If it worsens, it could problems with the heart, and since you already have heart problems, I do not want to aggravate it.  I would like to have you do a thyroid scan

## 2018-07-23 ENCOUNTER — OFFICE VISIT (OUTPATIENT)
Dept: CARDIOLOGY CLINIC | Facility: CLINIC | Age: 68
End: 2018-07-23
Payer: MEDICARE

## 2018-07-23 VITALS
DIASTOLIC BLOOD PRESSURE: 60 MMHG | SYSTOLIC BLOOD PRESSURE: 98 MMHG | RESPIRATION RATE: 16 BRPM | HEART RATE: 66 BPM | BODY MASS INDEX: 28 KG/M2 | WEIGHT: 164 LBS | HEIGHT: 64 IN

## 2018-07-23 DIAGNOSIS — I25.10 CORONARY ARTERY DISEASE INVOLVING NATIVE CORONARY ARTERY OF NATIVE HEART WITHOUT ANGINA PECTORIS: Primary | ICD-10-CM

## 2018-07-23 PROCEDURE — 99214 OFFICE O/P EST MOD 30 MIN: CPT | Performed by: INTERNAL MEDICINE

## 2018-07-23 PROCEDURE — G0463 HOSPITAL OUTPT CLINIC VISIT: HCPCS | Performed by: INTERNAL MEDICINE

## 2018-08-14 ENCOUNTER — TELEPHONE (OUTPATIENT)
Dept: INTERNAL MEDICINE CLINIC | Facility: CLINIC | Age: 68
End: 2018-08-14

## 2018-08-14 RX ORDER — ATORVASTATIN CALCIUM 40 MG/1
40 TABLET, FILM COATED ORAL
Qty: 90 TABLET | Refills: 0 | Status: SHIPPED | OUTPATIENT
Start: 2018-08-14 | End: 2018-10-24

## 2018-08-14 NOTE — TELEPHONE ENCOUNTER
Cholesterol Medications  Protocol Criteria:  · Appointment scheduled in the past 12 months or in the next 3 months  · ALT & LDL on file in the past 12 months  · ALT result < 80  · LDL result <130   Recent Outpatient Visits            3 weeks ago Coronary a

## 2018-09-12 DIAGNOSIS — M54.32 SCIATICA OF LEFT SIDE: ICD-10-CM

## 2018-09-12 NOTE — TELEPHONE ENCOUNTER
Pt requesting a refill om meds        Current Outpatient Medications:  gabapentin 300 MG Oral Cap Take 2 capsules (600 mg total) by mouth 3 (three) times daily.  Disp: 60 capsule Rfl: 1

## 2018-09-13 RX ORDER — GABAPENTIN 300 MG/1
600 CAPSULE ORAL 3 TIMES DAILY
Qty: 60 CAPSULE | Refills: 1 | Status: SHIPPED | OUTPATIENT
Start: 2018-09-13 | End: 2018-10-24

## 2018-09-14 NOTE — TELEPHONE ENCOUNTER
Refilled per protocol    Refill Protocol Appointment Criteria  · Appointment scheduled in the past 6 months or in the next 3 months  Recent Outpatient Visits            1 month ago Coronary artery disease involving native coronary artery of native heart wi

## 2018-09-20 ENCOUNTER — TELEPHONE (OUTPATIENT)
Dept: GASTROENTEROLOGY | Facility: CLINIC | Age: 68
End: 2018-09-20

## 2018-09-20 NOTE — TELEPHONE ENCOUNTER
----- Message from Simon Baumann RN sent at 1/11/2016  4:26 PM CST -----  Regarding: CLN recall  10 year CLN recall, per Dr. Alden Nelson; CLN done 10/20/08

## 2018-10-24 ENCOUNTER — MED REC SCAN ONLY (OUTPATIENT)
Dept: INTERNAL MEDICINE CLINIC | Facility: CLINIC | Age: 68
End: 2018-10-24

## 2018-10-24 ENCOUNTER — OFFICE VISIT (OUTPATIENT)
Dept: INTERNAL MEDICINE CLINIC | Facility: CLINIC | Age: 68
End: 2018-10-24
Payer: MEDICARE

## 2018-10-24 VITALS
WEIGHT: 165 LBS | BODY MASS INDEX: 27.49 KG/M2 | HEIGHT: 65 IN | DIASTOLIC BLOOD PRESSURE: 74 MMHG | SYSTOLIC BLOOD PRESSURE: 115 MMHG | HEART RATE: 69 BPM

## 2018-10-24 DIAGNOSIS — E78.5 DYSLIPIDEMIA: ICD-10-CM

## 2018-10-24 DIAGNOSIS — N63.0 BREAST MASS: ICD-10-CM

## 2018-10-24 DIAGNOSIS — Z23 NEED FOR VACCINATION: ICD-10-CM

## 2018-10-24 DIAGNOSIS — I25.10 CORONARY ARTERY DISEASE INVOLVING NATIVE CORONARY ARTERY OF NATIVE HEART WITHOUT ANGINA PECTORIS: ICD-10-CM

## 2018-10-24 DIAGNOSIS — Z12.11 SCREEN FOR COLON CANCER: ICD-10-CM

## 2018-10-24 DIAGNOSIS — M54.32 SCIATICA OF LEFT SIDE: Primary | ICD-10-CM

## 2018-10-24 DIAGNOSIS — R73.03 PREDIABETES: ICD-10-CM

## 2018-10-24 PROBLEM — R92.8 ABNORMAL MAMMOGRAM: Status: RESOLVED | Noted: 2018-01-17 | Resolved: 2018-10-24

## 2018-10-24 PROCEDURE — 90653 IIV ADJUVANT VACCINE IM: CPT | Performed by: INTERNAL MEDICINE

## 2018-10-24 PROCEDURE — G0463 HOSPITAL OUTPT CLINIC VISIT: HCPCS | Performed by: INTERNAL MEDICINE

## 2018-10-24 PROCEDURE — 99214 OFFICE O/P EST MOD 30 MIN: CPT | Performed by: INTERNAL MEDICINE

## 2018-10-24 PROCEDURE — G0008 ADMIN INFLUENZA VIRUS VAC: HCPCS | Performed by: INTERNAL MEDICINE

## 2018-10-24 RX ORDER — GABAPENTIN 300 MG/1
600 CAPSULE ORAL 3 TIMES DAILY
Qty: 180 CAPSULE | Refills: 5 | Status: SHIPPED | OUTPATIENT
Start: 2018-10-24 | End: 2019-04-27

## 2018-10-24 RX ORDER — ATORVASTATIN CALCIUM 40 MG/1
40 TABLET, FILM COATED ORAL
Qty: 90 TABLET | Refills: 1 | Status: SHIPPED | OUTPATIENT
Start: 2018-10-24 | End: 2018-10-29

## 2018-10-24 NOTE — ASSESSMENT & PLAN NOTE
Pt did not realize she was supposed to continue the atorvastatin when it ran out. I advised her that she needs to continue this.

## 2018-10-24 NOTE — ASSESSMENT & PLAN NOTE
The pt would like a second opinion regarding surgery.   I advised her to see Dr. José Miguel Garcia.

## 2018-10-24 NOTE — PROGRESS NOTES
HPI:    Patient ID: Edwin Guzman is a 77year old female. She didn't see Dr. Tone Neumann for a second opinion because he is in the same group as Dr. Cristina Noguera.   Dr. Cristina Noguera told her she would need a fusion and not a laminectomy because of the extent of the is spasms. Disp: 60 tablet Rfl: 1   docusate sodium (STOOL SOFTENER) 100 MG Oral Cap Take 100 mg by mouth 2 (two) times daily as needed for constipation. Disp:  Rfl:    Multiple Vitamin (ONE-DAILY MULTI VITAMINS) Oral Tab Take 1 tablet by mouth daily.  Disp: sounds. Pulmonary/Chest: Effort normal and breath sounds normal. No respiratory distress. Neurological: She is alert and oriented to person, place, and time.               ASSESSMENT/PLAN:     Problem List Items Addressed This Visit        High    Scia

## 2018-10-29 ENCOUNTER — TELEPHONE (OUTPATIENT)
Dept: INTERNAL MEDICINE CLINIC | Facility: CLINIC | Age: 68
End: 2018-10-29

## 2018-10-29 DIAGNOSIS — I25.10 CORONARY ARTERY DISEASE INVOLVING NATIVE CORONARY ARTERY OF NATIVE HEART WITHOUT ANGINA PECTORIS: ICD-10-CM

## 2018-10-29 RX ORDER — ATORVASTATIN CALCIUM 40 MG/1
40 TABLET, FILM COATED ORAL
Qty: 90 TABLET | Refills: 1 | Status: SHIPPED | OUTPATIENT
Start: 2018-10-29 | End: 2021-01-18

## 2018-10-29 NOTE — TELEPHONE ENCOUNTER
Pt called in stating that pharmacy denies getting medication below. Pt has received the gabapentin but not the atorvastatin. Pharmacy confirmed. Please advise.        Current Outpatient Medications:   •  atorvastatin 40 MG Oral Tab, Take 1 tablet (40

## 2018-10-30 ENCOUNTER — LAB ENCOUNTER (OUTPATIENT)
Dept: LAB | Facility: HOSPITAL | Age: 68
End: 2018-10-30
Attending: INTERNAL MEDICINE
Payer: MEDICARE

## 2018-10-30 DIAGNOSIS — E78.5 DYSLIPIDEMIA: ICD-10-CM

## 2018-10-30 DIAGNOSIS — R73.03 PREDIABETES: ICD-10-CM

## 2018-10-30 PROCEDURE — 36415 COLL VENOUS BLD VENIPUNCTURE: CPT

## 2018-10-30 PROCEDURE — 80061 LIPID PANEL: CPT

## 2018-10-30 PROCEDURE — 83036 HEMOGLOBIN GLYCOSYLATED A1C: CPT

## 2018-10-30 PROCEDURE — 85025 COMPLETE CBC W/AUTO DIFF WBC: CPT

## 2018-10-30 PROCEDURE — 80053 COMPREHEN METABOLIC PANEL: CPT

## 2018-10-30 PROCEDURE — 84443 ASSAY THYROID STIM HORMONE: CPT

## 2018-11-19 ENCOUNTER — HOSPITAL ENCOUNTER (OUTPATIENT)
Dept: ULTRASOUND IMAGING | Facility: HOSPITAL | Age: 68
Discharge: HOME | End: 2018-11-19
Attending: INTERNAL MEDICINE
Payer: MEDICARE

## 2018-11-19 DIAGNOSIS — R92.8 ABNORMAL MAMMOGRAM: ICD-10-CM

## 2018-11-19 PROCEDURE — 76642 ULTRASOUND BREAST LIMITED: CPT | Performed by: INTERNAL MEDICINE

## 2018-11-20 ENCOUNTER — NURSE ONLY (OUTPATIENT)
Dept: GASTROENTEROLOGY | Facility: CLINIC | Age: 68
End: 2018-11-20

## 2018-11-20 DIAGNOSIS — Z12.11 COLON CANCER SCREENING: Primary | ICD-10-CM

## 2018-11-20 NOTE — PROGRESS NOTES
Last Procedure:  Records reviewed. The patient had colonoscopy on October 20, 2008 at [facility]. The preparation was excellent. The colon was clearly seen. The gastrocolic is recommended a repeat examination in 10 years.  GI RN please put reca

## 2018-11-21 ENCOUNTER — TELEPHONE (OUTPATIENT)
Dept: GASTROENTEROLOGY | Facility: CLINIC | Age: 68
End: 2018-11-21

## 2018-11-21 NOTE — TELEPHONE ENCOUNTER
Colon screening nurse evaluation reviewed.   Okay to  schedule colonoscopy, diagnosis screening, split dose MiraLAX preparation, MAC at [facility] or IV sedation at [facility].

## 2018-11-26 NOTE — PROGRESS NOTES
Orders from Dr. Yamil Rosas 11/21/18     Colon screening nurse evaluation reviewed.   Kell to  schedule colonoscopy, diagnosis screening, split dose MiraLAX preparation, MAC at [facility] or IV sedation at [facility].

## 2018-11-28 NOTE — PROGRESS NOTES
Scheduled for:  Colonoscopy [address]3213  Provider Name: Dr. Lidia Funez  Date:  12/13/18  Location:  [facility]  Sedation:  IV  Time:  0930 (pt is aware to arrive at 0830)   Prep:  Miralax/Gatorade, mailed 11/29/18  Meds/Allergies Reconciled?:  Physician reviewed   Diagnosis

## 2018-12-05 ENCOUNTER — TELEPHONE (OUTPATIENT)
Dept: GASTROENTEROLOGY | Facility: CLINIC | Age: 68
End: 2018-12-05

## 2018-12-05 DIAGNOSIS — Z12.11 COLON CANCER SCREENING: Primary | ICD-10-CM

## 2018-12-06 NOTE — TELEPHONE ENCOUNTER
Spoke to pt and pt still wants to cancel procedure for 12/13/18      I canceled in [facility], sent a Surgical Case Change Request, forwarded to GI Schedulers.     ELEANOR Espino

## 2018-12-09 RX ORDER — PRASUGREL 10 MG/1
TABLET, FILM COATED ORAL
Qty: 90 TABLET | Refills: 0 | Status: SHIPPED | OUTPATIENT
Start: 2018-12-09

## 2018-12-12 ENCOUNTER — TELEPHONE (OUTPATIENT)
Dept: GASTROENTEROLOGY | Facility: CLINIC | Age: 68
End: 2018-12-12

## 2018-12-12 DIAGNOSIS — Z12.11 COLON CANCER SCREENING: Primary | ICD-10-CM

## 2018-12-12 NOTE — TELEPHONE ENCOUNTER
Refer to TE 12/05/18- Pt calling to schedule CLN. Pt states she has not been contacted to schedule CLN. Please call pt as soon as possible. Pt has been waiting to schedule CLN since 12/6/18. Per EBS pt should be scheduled in 1/2019.  thank you [phone]

## 2018-12-12 NOTE — TELEPHONE ENCOUNTER
Noted. No further action required by the GI Clinical staff.     [facility]

## 2018-12-12 NOTE — TELEPHONE ENCOUNTER
Surgery schedulers,    Can you please call this pt to schedule her procedure for January 2019    Refer to TE 11/20/18 GI Colon Screening

## 2018-12-13 RX ORDER — PANTOPRAZOLE SODIUM 40 MG/1
TABLET, DELAYED RELEASE ORAL
Qty: 90 TABLET | Refills: 0 | Status: SHIPPED | OUTPATIENT
Start: 2018-12-13 | End: 2019-03-08

## 2018-12-13 NOTE — TELEPHONE ENCOUNTER
Recent Visits  Date Type Provider Dept   10/24/18 Office Visit Makenna Mayorga MD [facility]-Internal Med   07/03/18 Office Visit MD [facility]-Internal Med   01/17/18 Office Visit MD [facility]-Internal Med   10/17/17 Office Visit Jose Carlos Trevino

## 2018-12-13 NOTE — TELEPHONE ENCOUNTER
Scheduled for:  Colonoscopy 05083  Provider Name: Dr. Raquel Chavez  Date:  1/4/19  Location:  [facility]  Sedation:  IV  Time:  0830 (pt is aware to arrive at 0730)   Prep:  2 day Maralax/Gatorade, mailed new instructions 12/14/18  Meds/Allergies Reconciled?:  Physician

## 2018-12-28 ENCOUNTER — TELEPHONE (OUTPATIENT)
Dept: GASTROENTEROLOGY | Facility: CLINIC | Age: 68
End: 2018-12-28

## 2018-12-28 DIAGNOSIS — M54.32 SCIATICA OF LEFT SIDE: ICD-10-CM

## 2018-12-28 RX ORDER — GABAPENTIN 300 MG/1
600 CAPSULE ORAL 3 TIMES DAILY
Qty: 180 CAPSULE | Refills: 5 | OUTPATIENT
Start: 2018-12-28

## 2018-12-28 NOTE — TELEPHONE ENCOUNTER
Pt has CLN scheduled for 1/4/19 and is inquiring if she should stop taking Omega-3-acid Ethyl Esters prior to procedure.  Please call thank you [phone]            Current Outpatient Medications:     •  Omega-3-acid Ethyl Esters (LOVAZA) 1 G Oral Cap, T

## 2018-12-28 NOTE — TELEPHONE ENCOUNTER
Discussed with GI staff, pt instructed to stop all vitamin and herbal supplements 1 week prior to procedure. Pt verbalized understanding.

## 2018-12-29 NOTE — TELEPHONE ENCOUNTER
Requested Prescriptions     Pending Prescriptions Disp Refills   • gabapentin 300 MG Oral Cap 180 capsule 5     Sig: Take 2 capsules (600 mg total) by mouth 3 (three) times daily. LR 10-24-18 # 180 + 5      Too soon for refill.

## 2019-01-02 ENCOUNTER — TELEPHONE (OUTPATIENT)
Dept: OTHER | Age: 69
End: 2019-01-02

## 2019-01-02 NOTE — TELEPHONE ENCOUNTER
Patient calling for refill of gabapentin. Patient received last refill on 10/24/18 for 6 month supply of medication. Patient states pharmacy told her that Dr. Cristine Wilkins sent the pharmacy a message to deny medication.    RN spoke to pharmacy and they sta

## 2019-01-02 NOTE — TELEPHONE ENCOUNTER
Pt stated pharmacy doesn't have refills on her 10/24/18 gabapentin 300 mg, quantity 180 with 5 refills. The 10/24/18 script was refilled once by patient. Informed pt I will call [facility] pharmacy for clarification on this matter.      I called [facility] pharmacy/[place]

## 2019-01-03 NOTE — TELEPHONE ENCOUNTER
Patient returned call, was advised a refill for 6 months was sent in Oct. Patient is aware of this, she reports spoke with [facility] and they reported to her they had made an error when processing first refill, instead of putting 1 month supply they put

## 2019-01-04 ENCOUNTER — HOSPITAL ENCOUNTER (OUTPATIENT)
Facility: HOSPITAL | Age: 69
Setting detail: HOSPITAL OUTPATIENT SURGERY
Discharge: HOME | End: 2019-01-04
Attending: INTERNAL MEDICINE | Admitting: INTERNAL MEDICINE
Payer: MEDICARE

## 2019-01-04 VITALS
HEART RATE: 83 BPM | BODY MASS INDEX: 28.7 KG/M2 | WEIGHT: 162 LBS | OXYGEN SATURATION: 100 % | RESPIRATION RATE: 14 BRPM | DIASTOLIC BLOOD PRESSURE: 78 MMHG | HEIGHT: 63 IN | SYSTOLIC BLOOD PRESSURE: 112 MMHG

## 2019-01-04 DIAGNOSIS — Z12.11 COLON CANCER SCREENING: ICD-10-CM

## 2019-01-04 PROBLEM — K57.30 DIVERTICULOSIS LARGE INTESTINE W/O PERFORATION OR ABSCESS W/O BLEEDING: Status: ACTIVE | Noted: 2019-01-04

## 2019-01-04 PROBLEM — Z98.890 S/P COLONOSCOPIC POLYPECTOMY: Status: ACTIVE | Noted: 2019-01-04

## 2019-01-04 PROBLEM — K64.8 INTERNAL HEMORRHOIDS WITHOUT COMPLICATION: Status: ACTIVE | Noted: 2019-01-04

## 2019-01-04 PROCEDURE — 45385 COLONOSCOPY W/LESION REMOVAL: CPT | Performed by: INTERNAL MEDICINE

## 2019-01-04 PROCEDURE — G0500 MOD SEDAT ENDO SERVICE >5YRS: HCPCS | Performed by: INTERNAL MEDICINE

## 2019-01-04 PROCEDURE — 0DBN8ZX EXCISION OF SIGMOID COLON, VIA NATURAL OR ARTIFICIAL OPENING ENDOSCOPIC, DIAGNOSTIC: ICD-10-PCS | Performed by: INTERNAL MEDICINE

## 2019-01-04 RX ORDER — MIDAZOLAM HYDROCHLORIDE 1 MG/ML
1 INJECTION INTRAMUSCULAR; INTRAVENOUS EVERY 5 MIN PRN
Status: DISCONTINUED | OUTPATIENT
Start: 2019-01-04 | End: 2019-01-04

## 2019-01-04 RX ORDER — MIDAZOLAM HYDROCHLORIDE 1 MG/ML
INJECTION INTRAMUSCULAR; INTRAVENOUS
Status: DISCONTINUED | OUTPATIENT
Start: 2019-01-04 | End: 2019-01-04

## 2019-01-04 RX ORDER — SODIUM CHLORIDE 0.9 % (FLUSH) 0.9 %
10 SYRINGE (ML) INJECTION AS NEEDED
Status: DISCONTINUED | OUTPATIENT
Start: 2019-01-04 | End: 2019-01-04

## 2019-01-04 RX ORDER — SODIUM CHLORIDE, SODIUM LACTATE, POTASSIUM CHLORIDE, CALCIUM CHLORIDE 600; 310; 30; 20 MG/100ML; MG/100ML; MG/100ML; MG/100ML
INJECTION, SOLUTION INTRAVENOUS CONTINUOUS
Status: DISCONTINUED | OUTPATIENT
Start: 2019-01-04 | End: 2019-01-04

## 2019-01-04 NOTE — H&P
History & Physical Examination    Patient Name: Levi Mendoza  MRN: [id number]  [facility]: [id number]  YOB: 1950    Diagnosis: Colorectal screening      Medications Prior to Admission:  atorvastatin 40 MG Oral Tab Take 1 tablet (40 mg total) by Allergies:   Vicodin [Hydrocodon*    ITCHING    Comment:Other reaction(s): Pruritus    Past Medical History:   Diagnosis Date   • Back problem    • Coronary atherosclerosis    • High cholesterol    • History of heart artery stent 09/2017   • Hx of re

## 2019-01-04 NOTE — BRIEF OP NOTE
Pre-Operative Diagnosis: Colon cancer screening     Post-Operative Diagnosis: Status post polypectomy x1, uncomplicated diverticulosis, internal hemorrhoids     Procedure Performed:   Procedure(s):  COLONOSCOPY with cold snare polypectomy    Surgeon(s) and

## 2019-01-04 NOTE — OPERATIVE REPORT
[facility]    PATIENT'S NAME: Hayden Meaghan   ATTENDING PHYSICIAN: Jarrell Landin MD   OPERATING PHYSICIAN: Jarrell Landin MD   PATIENT ACCOUNT#:   [id number]    LOCATION:  [facility] ROOM [address] [place] [address] polyps or mass lesions were seen. There were scattered diverticula predominantly in the sigmoid colon without inflammatory changes. Retroflexion in the rectum showed small internal hemorrhoids.   The patient tolerated the procedure well without immediate

## 2019-01-05 ENCOUNTER — TELEPHONE (OUTPATIENT)
Dept: GASTROENTEROLOGY | Facility: CLINIC | Age: 69
End: 2019-01-05

## 2019-01-07 NOTE — TELEPHONE ENCOUNTER
Entered into Epic:Recall colon in 5 years per Dr. Wilber Green. Last Colon done 1/4/19, next due 1/4/24. Snapshot updated. Letter mailed.

## 2019-02-14 ENCOUNTER — TELEPHONE (OUTPATIENT)
Dept: CARDIOLOGY CLINIC | Facility: CLINIC | Age: 69
End: 2019-02-14

## 2019-02-14 NOTE — TELEPHONE ENCOUNTER
Pt states that she needs to have myelogram for her spine done and will need to be off aspirin for 5 days. Test is not scheduled yet. Test has been ordered by Dr. Rupinder Pinto at [facility]. Please call.

## 2019-02-14 NOTE — TELEPHONE ENCOUNTER
Reviewed with Dr. Melissa Reynolds, ojrge to hold aspirin for 5 days. Verified ILDA and LAURA on cell phone that she may hold her aspirin for 5 days for her myelogram, to call with any questions.

## 2019-02-19 ENCOUNTER — OFFICE VISIT (OUTPATIENT)
Dept: INTERNAL MEDICINE CLINIC | Facility: CLINIC | Age: 69
End: 2019-02-19
Payer: MEDICARE

## 2019-02-19 VITALS
SYSTOLIC BLOOD PRESSURE: 118 MMHG | DIASTOLIC BLOOD PRESSURE: 75 MMHG | HEART RATE: 61 BPM | BODY MASS INDEX: 27.94 KG/M2 | WEIGHT: 167.69 LBS | HEIGHT: 65 IN

## 2019-02-19 DIAGNOSIS — M54.32 SCIATICA OF LEFT SIDE: Primary | ICD-10-CM

## 2019-02-19 DIAGNOSIS — R92.8 ABNORMAL MAMMOGRAM: ICD-10-CM

## 2019-02-19 DIAGNOSIS — I25.10 CORONARY ARTERY DISEASE INVOLVING NATIVE CORONARY ARTERY OF NATIVE HEART WITHOUT ANGINA PECTORIS: ICD-10-CM

## 2019-02-19 DIAGNOSIS — K59.00 CONSTIPATION, UNSPECIFIED CONSTIPATION TYPE: ICD-10-CM

## 2019-02-19 PROCEDURE — 99214 OFFICE O/P EST MOD 30 MIN: CPT | Performed by: INTERNAL MEDICINE

## 2019-02-19 PROCEDURE — G0463 HOSPITAL OUTPT CLINIC VISIT: HCPCS | Performed by: INTERNAL MEDICINE

## 2019-02-19 NOTE — PATIENT INSTRUCTIONS
Please schedule your diagnostic mammogram.  Call [phone]. Take Miralax daily with 4 oz water or juice. Eating a High-Fiber Diet  Fiber is what gives strength and structure to plants.  Most grains, beans, vegetables, and fruits contain fib track of your fiber  Keep track of how much fiber you eat. Start by reading food labels. Then eat a variety of foods high in fiber.  As you start to eat more fiber, ask your healthcare provider how much water you should be drinking to keep your digestive sy

## 2019-02-19 NOTE — PROGRESS NOTES
HPI:    Patient ID: Barry Valle is a 77year old female. The pt c/o persistent severe left low back pain with sciatica. She had 2 injections with Dr. Daniella Turner and then she saw Dr. Geraldo Echeverria.  She needs to go off the aspirin for 5 days for the myelogram daily. Disp: 90 tablet Rfl: 1   gabapentin 300 MG Oral Cap Take 2 capsules (600 mg total) by mouth 3 (three) times daily. Disp: 180 capsule Rfl: 5   Cyclobenzaprine HCl 10 MG Oral Tab Take 1 tablet (10 mg total) by mouth as needed for Muscle spasms.  Disp: normal heart sounds. Pulmonary/Chest: Effort normal and breath sounds normal. No respiratory distress. Musculoskeletal:        Lumbar back: She exhibits tenderness. Neurological: She is alert and oriented to person, place, and time.  She displays nor

## 2019-02-19 NOTE — ASSESSMENT & PLAN NOTE
Advised to add Cathie lax and increase the fiber in her diet.   Roger written information given to pt

## 2019-02-28 VITALS
HEIGHT: 64 IN | BODY MASS INDEX: 28 KG/M2 | WEIGHT: 164 LBS | DIASTOLIC BLOOD PRESSURE: 82 MMHG | HEART RATE: 75 BPM | SYSTOLIC BLOOD PRESSURE: 122 MMHG

## 2019-02-28 VITALS
HEIGHT: 63 IN | BODY MASS INDEX: 29.59 KG/M2 | OXYGEN SATURATION: 96 % | WEIGHT: 167 LBS | HEART RATE: 81 BPM | SYSTOLIC BLOOD PRESSURE: 120 MMHG | DIASTOLIC BLOOD PRESSURE: 60 MMHG

## 2019-03-08 NOTE — TELEPHONE ENCOUNTER
Current Outpatient Medications:  PANTOPRAZOLE SODIUM 40 MG Oral Tab EC TAKE 1 TABLET BY MOUTH EVERY DAY IN THE MORNING BEFORE BREAKFAST Disp: 90 tablet Rfl: 0

## 2019-03-09 RX ORDER — PANTOPRAZOLE SODIUM 40 MG/1
TABLET, DELAYED RELEASE ORAL
Qty: 90 TABLET | Refills: 0 | Status: SHIPPED | OUTPATIENT
Start: 2019-03-09 | End: 2019-06-04

## 2019-04-27 DIAGNOSIS — M54.32 SCIATICA OF LEFT SIDE: ICD-10-CM

## 2019-04-28 RX ORDER — GABAPENTIN 300 MG/1
600 CAPSULE ORAL 3 TIMES DAILY
Qty: 180 CAPSULE | Refills: 3 | Status: SHIPPED | OUTPATIENT
Start: 2019-04-28 | End: 2019-07-28

## 2019-05-10 ENCOUNTER — TELEPHONE (OUTPATIENT)
Dept: INTERNAL MEDICINE CLINIC | Facility: CLINIC | Age: 69
End: 2019-05-10

## 2019-05-10 DIAGNOSIS — M54.32 SCIATICA OF LEFT SIDE: ICD-10-CM

## 2019-05-10 RX ORDER — TRAMADOL HYDROCHLORIDE 50 MG/1
TABLET ORAL
Qty: 180 TABLET | Refills: 1 | Status: SHIPPED | OUTPATIENT
Start: 2019-05-10

## 2019-05-10 NOTE — TELEPHONE ENCOUNTER
Please call in tramadol    Requested Prescriptions     Signed Prescriptions Disp Refills   • traMADol HCl 50 MG Oral Tab 180 tablet 1     Si-2 tabs every 4 time daily prn. Maximum 6 daily.      Authorizing Provider: Sagrario Wilkerson [facility] data rev

## 2019-05-16 ENCOUNTER — PATIENT MESSAGE (OUTPATIENT)
Dept: CARDIOLOGY CLINIC | Facility: CLINIC | Age: 69
End: 2019-05-16

## 2019-05-16 DIAGNOSIS — Z01.810 PREOP CARDIOVASCULAR EXAM: Primary | ICD-10-CM

## 2019-05-16 NOTE — TELEPHONE ENCOUNTER
From: Karie Keys  To: Barb Clancy MD  Sent: 5/16/2019 2:05 PM CDT  Subject: Other    Dear Dr. Janae Ji,    I am scheduled to have T12 pelvis fusion; L3-S1 TLIFs on June 14, 2019 with Dr. Santos Sanders at [facility].  I need clearance from you and an Echoc

## 2019-05-20 ENCOUNTER — HOSPITAL ENCOUNTER (OUTPATIENT)
Dept: GENERAL RADIOLOGY | Facility: HOSPITAL | Age: 69
Discharge: HOME | End: 2019-05-20
Attending: INTERNAL MEDICINE
Payer: MEDICARE

## 2019-05-20 ENCOUNTER — PATIENT MESSAGE (OUTPATIENT)
Dept: CARDIOLOGY CLINIC | Facility: CLINIC | Age: 69
End: 2019-05-20

## 2019-05-20 ENCOUNTER — LAB ENCOUNTER (OUTPATIENT)
Dept: LAB | Facility: HOSPITAL | Age: 69
End: 2019-05-20
Attending: INTERNAL MEDICINE
Payer: MEDICARE

## 2019-05-20 DIAGNOSIS — I25.10 CORONARY ATHEROSCLEROSIS OF NATIVE CORONARY ARTERY: Primary | ICD-10-CM

## 2019-05-20 DIAGNOSIS — I25.10 CORONARY ARTERY DISEASE INVOLVING NATIVE CORONARY ARTERY OF NATIVE HEART WITHOUT ANGINA PECTORIS: ICD-10-CM

## 2019-05-20 PROCEDURE — 93010 ELECTROCARDIOGRAM REPORT: CPT | Performed by: INTERNAL MEDICINE

## 2019-05-20 PROCEDURE — 71046 X-RAY EXAM CHEST 2 VIEWS: CPT | Performed by: INTERNAL MEDICINE

## 2019-05-20 PROCEDURE — 93005 ELECTROCARDIOGRAM TRACING: CPT

## 2019-05-20 NOTE — TELEPHONE ENCOUNTER
From: Dianna Pearl  To: Ezra Lr MD  Sent: 5/20/2019 7:24 AM CDT  Subject: Other    Dear Dr. Augusto Kaerns,    I talked to your office and scheduled an appontment regarding my 6/14/19 surgery.  The nurse said she would contact me about taking the [employer]

## 2019-05-20 NOTE — PROGRESS NOTES
Richard Iniguez returned call and both ekg and echo on list to be done. Then states she has an echo order. She is going for labs today at [facility], will use her order to schedule Echo, if order not valid will confer with [facility].    Called and LM to confirm she means ec

## 2019-05-22 ENCOUNTER — OFFICE VISIT (OUTPATIENT)
Dept: INTERNAL MEDICINE CLINIC | Facility: CLINIC | Age: 69
End: 2019-05-22
Payer: MEDICARE

## 2019-05-22 ENCOUNTER — TELEPHONE (OUTPATIENT)
Dept: INTERNAL MEDICINE CLINIC | Facility: CLINIC | Age: 69
End: 2019-05-22

## 2019-05-22 VITALS
WEIGHT: 168.19 LBS | HEART RATE: 63 BPM | DIASTOLIC BLOOD PRESSURE: 70 MMHG | HEIGHT: 65 IN | BODY MASS INDEX: 28.02 KG/M2 | SYSTOLIC BLOOD PRESSURE: 120 MMHG

## 2019-05-22 DIAGNOSIS — I25.10 CORONARY ARTERY DISEASE INVOLVING NATIVE CORONARY ARTERY OF NATIVE HEART WITHOUT ANGINA PECTORIS: ICD-10-CM

## 2019-05-22 DIAGNOSIS — Z95.5 S/P DRUG ELUTING CORONARY STENT PLACEMENT: ICD-10-CM

## 2019-05-22 DIAGNOSIS — Z01.818 PRE-OP EXAM: Primary | ICD-10-CM

## 2019-05-22 DIAGNOSIS — M48.062 SPINAL STENOSIS OF LUMBAR REGION WITH NEUROGENIC CLAUDICATION: ICD-10-CM

## 2019-05-22 DIAGNOSIS — E05.90 SUBCLINICAL HYPERTHYROIDISM: Primary | ICD-10-CM

## 2019-05-22 DIAGNOSIS — R92.8 ABNORMAL MAMMOGRAM: ICD-10-CM

## 2019-05-22 PROBLEM — Z12.31 VISIT FOR SCREENING MAMMOGRAM: Status: RESOLVED | Noted: 2017-10-17 | Resolved: 2019-05-22

## 2019-05-22 PROBLEM — M54.16 LUMBAR RADICULOPATHY: Status: RESOLVED | Noted: 2017-08-03 | Resolved: 2019-05-22

## 2019-05-22 PROBLEM — Z12.11 SCREEN FOR COLON CANCER: Status: RESOLVED | Noted: 2018-10-24 | Resolved: 2019-05-22

## 2019-05-22 PROBLEM — K59.00 CONSTIPATION: Status: RESOLVED | Noted: 2019-02-19 | Resolved: 2019-05-22

## 2019-05-22 PROBLEM — Z00.00 ROUTINE HEALTH MAINTENANCE: Status: RESOLVED | Noted: 2017-10-17 | Resolved: 2019-05-22

## 2019-05-22 PROCEDURE — 99215 OFFICE O/P EST HI 40 MIN: CPT | Performed by: INTERNAL MEDICINE

## 2019-05-22 PROCEDURE — G0463 HOSPITAL OUTPT CLINIC VISIT: HCPCS | Performed by: INTERNAL MEDICINE

## 2019-05-22 NOTE — TELEPHONE ENCOUNTER
I reviewed her labs done at [facility] and I would like for her to do another thyroid blood test. I would also like to do an ultrasound of her thyroid, because the chest x-ray showed that the thyroid may be pushing her trachea slightly off center.   I would like

## 2019-05-22 NOTE — PROGRESS NOTES
HPI:    Patient ID: Jelani Finley is a 68year old female. Pt is here for a pre-op physical for T12-pelvis fusion; L3-S1 TLIFs surgery on 6/14/19 with Dr. Cheyenne Lu at [facility]. She is having severe left low back pain. Tramadol is not helping.   Sometimes depressed mood. The patient is not nervous/anxious.         Past Surgical History:   Procedure Laterality Date   • ANESTH,SURGERY OF SHOULDER     • BACK SURGERY  2006   • BSO, OMENTECTOMY W/SAMEERA  1985    partial    • COLONOSCOPY N/A 1/4/2019    Performed by TIMES DAILY.  Disp: 180 capsule Rfl: 3   Pantoprazole Sodium 40 MG Oral Tab EC TAKE 1 TABLET BY MOUTH EVERY DAY IN THE MORNING BEFORE BREAKFAST Disp: 90 tablet Rfl: 0   PRASUGREL HCL 10 MG Oral Tab TAKE 1 TABLET BY MOUTH EVERY DAY Disp: 90 tablet Rfl: 0   a and atraumatic. Right Ear: Tympanic membrane and ear canal normal.   Left Ear: Tympanic membrane and ear canal normal.   Nose: Nose normal.   Mouth/Throat: Oropharynx is clear and moist.   Eyes: Pupils are equal, round, and reactive to light.  Conjunctiva

## 2019-05-29 NOTE — PROGRESS NOTES
Received call from Dr. Greta Graham from [facility], He is the anesthesiologist for her surgery. He is requesting a stress echo for pt not a echo or dobt stress test.  A stress test.    Call out to [facility].    Will discuss at [address] on 6/3 with LB for order

## 2019-05-30 ENCOUNTER — TELEPHONE (OUTPATIENT)
Dept: CARDIOLOGY | Age: 69
End: 2019-05-30

## 2019-05-31 ENCOUNTER — HOSPITAL ENCOUNTER (OUTPATIENT)
Dept: ULTRASOUND IMAGING | Facility: HOSPITAL | Age: 69
Discharge: HOME | End: 2019-05-31
Attending: INTERNAL MEDICINE
Payer: MEDICARE

## 2019-05-31 DIAGNOSIS — E05.90 SUBCLINICAL HYPERTHYROIDISM: ICD-10-CM

## 2019-05-31 PROCEDURE — 76536 US EXAM OF HEAD AND NECK: CPT | Performed by: INTERNAL MEDICINE

## 2019-06-01 ENCOUNTER — TELEPHONE (OUTPATIENT)
Dept: OTHER | Age: 69
End: 2019-06-01

## 2019-06-01 DIAGNOSIS — M54.32 SCIATICA OF LEFT SIDE: ICD-10-CM

## 2019-06-01 RX ORDER — CYCLOBENZAPRINE HCL 10 MG
10 TABLET ORAL AS NEEDED
Qty: 60 TABLET | Refills: 1 | Status: SHIPPED | OUTPATIENT
Start: 2019-06-01

## 2019-06-01 NOTE — TELEPHONE ENCOUNTER
Patient calling back , states that she is very concern about her back pain for the biopsy, states that when she did her US head/neck, she was in a lot of pain and pain medication tramadol did not work during the test, she had a hard time getting the right

## 2019-06-01 NOTE — PROGRESS NOTES
Please call pt: There are 2 nodules in her thyroid which need to be biopsied. This can be done through the radiology department. I recommend that she do this before her back surgery.  She should call [address] [place] [address] Radiology to schedule the biop

## 2019-06-03 ENCOUNTER — OFFICE VISIT (OUTPATIENT)
Dept: CARDIOLOGY CLINIC | Facility: CLINIC | Age: 69
End: 2019-06-03
Payer: MEDICARE

## 2019-06-03 VITALS
SYSTOLIC BLOOD PRESSURE: 123 MMHG | WEIGHT: 168 LBS | DIASTOLIC BLOOD PRESSURE: 82 MMHG | RESPIRATION RATE: 20 BRPM | HEART RATE: 60 BPM | BODY MASS INDEX: 28 KG/M2 | OXYGEN SATURATION: 98 %

## 2019-06-03 DIAGNOSIS — I25.10 CORONARY ARTERY DISEASE INVOLVING NATIVE CORONARY ARTERY OF NATIVE HEART WITHOUT ANGINA PECTORIS: Primary | ICD-10-CM

## 2019-06-03 PROCEDURE — G0463 HOSPITAL OUTPT CLINIC VISIT: HCPCS | Performed by: INTERNAL MEDICINE

## 2019-06-03 PROCEDURE — 99214 OFFICE O/P EST MOD 30 MIN: CPT | Performed by: INTERNAL MEDICINE

## 2019-06-03 NOTE — PROGRESS NOTES
Name:  Juan Henson  : 1950    Date of consultation:   6/3/2019    Referring physician: Amanda Durán MD    Reason for Visit:  Patient presents with:   Follow - Up  Surgical/procedure Clearance: Back surgery on 19      HPI:   Patient is a EVERY DAY Disp: 90 tablet Rfl: 0     Family History:  Family History   Problem Relation Age of Onset   • Cancer Father         pancreatic cancer   • Diabetes Mother    • Cancer Mother         Mother  from melanoma   • Cancer Sister         pt.cannot re 04/08/2015     Lab Results   Component Value Date     (H) 10/30/2018    BUN 16 10/30/2018    BUNCREA 16.3 10/30/2018    CREATSERUM 0.98 10/30/2018    ANIONGAP 6 10/30/2018    GFRNAA 59 (L) 10/30/2018    GFRAA >60 10/30/2018    CA 9.8 10/30/2018    O

## 2019-06-04 ENCOUNTER — TELEPHONE (OUTPATIENT)
Dept: INTERNAL MEDICINE CLINIC | Facility: CLINIC | Age: 69
End: 2019-06-04

## 2019-06-04 ENCOUNTER — TELEPHONE (OUTPATIENT)
Dept: CARDIOLOGY CLINIC | Facility: CLINIC | Age: 69
End: 2019-06-04

## 2019-06-04 ENCOUNTER — PATIENT MESSAGE (OUTPATIENT)
Dept: INTERNAL MEDICINE CLINIC | Facility: CLINIC | Age: 69
End: 2019-06-04

## 2019-06-04 RX ORDER — PANTOPRAZOLE SODIUM 40 MG/1
TABLET, DELAYED RELEASE ORAL
Qty: 90 TABLET | Refills: 1 | Status: SHIPPED | OUTPATIENT
Start: 2019-06-04 | End: 2019-11-27

## 2019-06-04 NOTE — TELEPHONE ENCOUNTER
Dr. Lizandro Lira wanted stress echo scheduled asap. Test has been scheduled for 06/06. Please obtain PA if required.

## 2019-06-04 NOTE — TELEPHONE ENCOUNTER
[facility]:Anesthesiology Administrative Office called, stating: That Pt.'s Pre-Surgical Clearance Notes were faxed to a wrong Number and received at her department [facility] [address], stated her Dept. is not related to the Surgeon's practice. 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 She stated that the pre-op forms should be faxed to the Surgeon's office: DR. Renteria. The surgeon's OFFICE #: [phone] , She didn't have the FAX# for the surgeon's and needs to be obtain from their office. Note: the FAX # that was used in error belongs to her dept.     Pt. Surgery is on 06/14/19

## 2019-06-04 NOTE — TELEPHONE ENCOUNTER
Please fax pre-op note from 5/22/19 to:    Anuradha Zarco MD    [address] [facility]    [place], [address]   [phone] ([address], [address] [facility], [address] [facility] Neurosurgery [place] [address]

## 2019-06-05 ENCOUNTER — TELEPHONE (OUTPATIENT)
Dept: CARDIOLOGY CLINIC | Facility: CLINIC | Age: 69
End: 2019-06-05

## 2019-06-05 NOTE — TELEPHONE ENCOUNTER
Need Surgery Clearance to be faxed to [facility] - fax # [phone]. Pt. Is sched to have surgery on 6/14/19.

## 2019-06-05 NOTE — IMAGING NOTE
Spoke with patient regarding procedure. Holding all meds including Effient, has a surgical procedure 203 days later. Allowed to have breakfast, no  necessary. LOS about 1 hour.

## 2019-06-06 ENCOUNTER — HOSPITAL ENCOUNTER (OUTPATIENT)
Dept: ULTRASOUND IMAGING | Facility: HOSPITAL | Age: 69
Discharge: HOME | End: 2019-06-06
Attending: INTERNAL MEDICINE
Payer: MEDICARE

## 2019-06-06 ENCOUNTER — HOSPITAL ENCOUNTER (OUTPATIENT)
Dept: MAMMOGRAPHY | Facility: HOSPITAL | Age: 69
Discharge: HOME | End: 2019-06-06
Attending: INTERNAL MEDICINE
Payer: MEDICARE

## 2019-06-06 ENCOUNTER — HOSPITAL ENCOUNTER (OUTPATIENT)
Dept: CV DIAGNOSTICS | Facility: HOSPITAL | Age: 69
Discharge: HOME | End: 2019-06-06
Attending: INTERNAL MEDICINE
Payer: MEDICARE

## 2019-06-06 DIAGNOSIS — I25.10 CORONARY ARTERY DISEASE INVOLVING NATIVE CORONARY ARTERY OF NATIVE HEART WITHOUT ANGINA PECTORIS: ICD-10-CM

## 2019-06-06 DIAGNOSIS — R92.8 ABNORMAL MAMMOGRAM: ICD-10-CM

## 2019-06-06 PROCEDURE — 76642 ULTRASOUND BREAST LIMITED: CPT | Performed by: INTERNAL MEDICINE

## 2019-06-06 PROCEDURE — 93018 CV STRESS TEST I&R ONLY: CPT | Performed by: INTERNAL MEDICINE

## 2019-06-06 PROCEDURE — 93350 STRESS TTE ONLY: CPT | Performed by: INTERNAL MEDICINE

## 2019-06-06 PROCEDURE — 77062 BREAST TOMOSYNTHESIS BI: CPT | Performed by: INTERNAL MEDICINE

## 2019-06-06 PROCEDURE — 93016 CV STRESS TEST SUPVJ ONLY: CPT | Performed by: INTERNAL MEDICINE

## 2019-06-06 PROCEDURE — 93017 CV STRESS TEST TRACING ONLY: CPT | Performed by: INTERNAL MEDICINE

## 2019-06-06 PROCEDURE — 77066 DX MAMMO INCL CAD BI: CPT | Performed by: INTERNAL MEDICINE

## 2019-06-11 ENCOUNTER — HOSPITAL ENCOUNTER (OUTPATIENT)
Dept: ULTRASOUND IMAGING | Facility: HOSPITAL | Age: 69
Discharge: HOME | End: 2019-06-11
Attending: INTERNAL MEDICINE
Payer: MEDICARE

## 2019-06-20 ENCOUNTER — HOSPITAL ENCOUNTER (OUTPATIENT)
Dept: ULTRASOUND IMAGING | Facility: HOSPITAL | Age: 69
Discharge: HOME | End: 2019-06-20
Attending: INTERNAL MEDICINE
Payer: MEDICARE

## 2019-06-20 VITALS
DIASTOLIC BLOOD PRESSURE: 70 MMHG | HEART RATE: 69 BPM | HEIGHT: 64 IN | BODY MASS INDEX: 29.53 KG/M2 | SYSTOLIC BLOOD PRESSURE: 117 MMHG | WEIGHT: 173 LBS

## 2019-06-20 DIAGNOSIS — E04.2 MULTIPLE THYROID NODULES: ICD-10-CM

## 2019-06-20 PROCEDURE — 88172 CYTP DX EVAL FNA 1ST EA SITE: CPT | Performed by: INTERNAL MEDICINE

## 2019-06-20 PROCEDURE — 88305 TISSUE EXAM BY PATHOLOGIST: CPT | Performed by: INTERNAL MEDICINE

## 2019-06-20 PROCEDURE — 88177 CYTP FNA EVAL EA ADDL: CPT | Performed by: INTERNAL MEDICINE

## 2019-06-20 PROCEDURE — 88173 CYTOPATH EVAL FNA REPORT: CPT | Performed by: INTERNAL MEDICINE

## 2019-06-20 PROCEDURE — 10005 FNA BX W/US GDN 1ST LES: CPT | Performed by: INTERNAL MEDICINE

## 2019-06-20 NOTE — IMAGING NOTE
Mrs. Kimberly Dunn arrived to ultrasound room #4   Medications and allergies reviewed.      scans taken by Guadalupe Phillip ultrasound technologist     History taken: Mrs. Kimberly Dunn reported that thyroid nodule was an incidental finding of ultrasound imaging perfor

## 2019-06-20 NOTE — PROCEDURES
[facility]  [facility]    [facility] Patient Status:  Outpatient    1950 MRN [id number]   Location [address] [facility] Attending Lucas Anders MD   Hosp Day # 0 PCP Ruth Rodriguez MD     Procedure: ultr

## 2019-06-25 ENCOUNTER — TELEPHONE (OUTPATIENT)
Dept: INTERNAL MEDICINE CLINIC | Facility: CLINIC | Age: 69
End: 2019-06-25

## 2019-06-25 NOTE — TELEPHONE ENCOUNTER
Pt called in requesting to have pre-op clearance to be sent to Dr. Kadeem Villalobos at [facility] on 7/9.   Pt will call back with contact info

## 2019-06-25 NOTE — TELEPHONE ENCOUNTER
Pt returning call with contact information.      Dr. Tio Ruiz    # [phone] (Direct number for surgery RN)  Or  #[phone] Brennan Mancilla RN   #[phone] (office number)  Fax# [phone]

## 2019-06-25 NOTE — TELEPHONE ENCOUNTER
Pt called in requesting for PCP nurse to vi sherman back to explain the test she is taking.     She stated a least extend to her the courtesy as suppose to putting something on MyChart she does not understand    Please Pt said can call anytime

## 2019-07-28 DIAGNOSIS — M54.32 SCIATICA OF LEFT SIDE: ICD-10-CM

## 2019-07-29 RX ORDER — GABAPENTIN 300 MG/1
600 CAPSULE ORAL 3 TIMES DAILY
Qty: 540 CAPSULE | Refills: 1 | Status: SHIPPED | OUTPATIENT
Start: 2019-07-29 | End: 2020-01-26

## 2019-07-29 NOTE — TELEPHONE ENCOUNTER
Refill passed per [facility], [facility] protocol.   Refill Protocol Appointment Criteria  · Appointment scheduled in the past 6 months or in the next 3 months  Recent Outpatient Visits            1 month ago Coronary artery disease involving native coronary arter

## 2019-07-30 ENCOUNTER — MED REC SCAN ONLY (OUTPATIENT)
Dept: INTERNAL MEDICINE CLINIC | Facility: CLINIC | Age: 69
End: 2019-07-30

## 2019-08-14 ENCOUNTER — MED REC SCAN ONLY (OUTPATIENT)
Dept: INTERNAL MEDICINE CLINIC | Facility: CLINIC | Age: 69
End: 2019-08-14

## 2019-10-01 ENCOUNTER — TELEPHONE (OUTPATIENT)
Dept: INTERNAL MEDICINE CLINIC | Facility: CLINIC | Age: 69
End: 2019-10-01

## 2019-10-03 ENCOUNTER — MED REC SCAN ONLY (OUTPATIENT)
Dept: INTERNAL MEDICINE CLINIC | Facility: CLINIC | Age: 69
End: 2019-10-03

## 2019-10-11 ENCOUNTER — TELEPHONE (OUTPATIENT)
Dept: INTERNAL MEDICINE CLINIC | Facility: CLINIC | Age: 69
End: 2019-10-11

## 2019-10-11 NOTE — TELEPHONE ENCOUNTER
[address] [facility] Physical Therapy called to clarify of Doctor Cuba Richard home health services and if so requesting order so patient can be discharged Please relay to [facility] office at [phone] and can be relayed to any clininal m

## 2019-10-12 NOTE — TELEPHONE ENCOUNTER
[id number] [facility] and left a message to inform of PCP comments regarding pt     On call nurse took down message

## 2019-10-14 ENCOUNTER — ORDER TRANSCRIPTION (OUTPATIENT)
Dept: PHYSICAL THERAPY | Age: 69
End: 2019-10-14

## 2019-10-14 ENCOUNTER — OFFICE VISIT (OUTPATIENT)
Dept: PHYSICAL THERAPY | Age: 69
End: 2019-10-14
Attending: NEUROLOGICAL SURGERY
Payer: MEDICARE

## 2019-10-14 DIAGNOSIS — M43.16 SPONDYLOLISTHESIS OF LUMBAR REGION: Primary | ICD-10-CM

## 2019-10-14 DIAGNOSIS — M43.16 SPONDYLOLISTHESIS OF LUMBAR REGION: ICD-10-CM

## 2019-10-14 PROCEDURE — 97162 PT EVAL MOD COMPLEX 30 MIN: CPT | Performed by: PHYSICAL THERAPIST

## 2019-10-14 PROCEDURE — 97110 THERAPEUTIC EXERCISES: CPT | Performed by: PHYSICAL THERAPIST

## 2019-10-14 NOTE — PROGRESS NOTES
SPINE EVALUATION:   Referring Physician: Dr. Ashley Lozano  Diagnosis: Spondylolisthesis L3-4; LBP and L LE pain, s/p surgery Date of Service: 10/14/2019     PATIENT SUMMARY   Donna Sanches is a 68year old female who presents to therapy today with complaint Therapy is medically necessary to address the above impairments and reach functional goals. Tardy arrival ( ~ 20 minutes late) limited content of session. Precautions:  hx of lumbar laminectomy, R shoulder rotator cuff repair, stent.    OBJECTIVE:   Ob levels. PLAN OF CARE:    Goals: (to be met in 8 visits)   1. Patient to report independence with HEP to facilitate self management.    2. Patient to stand 10 minutes to cook meal unlimited by LBP/LE c/o.   3. Patient to walk 2 blocks with AD.   4. Patient

## 2019-10-16 ENCOUNTER — OFFICE VISIT (OUTPATIENT)
Dept: PHYSICAL THERAPY | Age: 69
End: 2019-10-16
Attending: NEUROLOGICAL SURGERY
Payer: MEDICARE

## 2019-10-16 PROCEDURE — 97110 THERAPEUTIC EXERCISES: CPT | Performed by: PHYSICAL THERAPIST

## 2019-10-16 PROCEDURE — 97530 THERAPEUTIC ACTIVITIES: CPT | Performed by: PHYSICAL THERAPIST

## 2019-10-16 PROCEDURE — 97140 MANUAL THERAPY 1/> REGIONS: CPT | Performed by: PHYSICAL THERAPIST

## 2019-10-16 NOTE — TELEPHONE ENCOUNTER
Sidra Agrawal called in from [facility]  She stated she received the orders that was faxed back but some of the pages were missing and she need all 5 pages be faxed back.   She stated she will refax the orders  Today    Home health is requesting call back

## 2019-10-16 NOTE — PROGRESS NOTES
Dx:      Spondylolisthesis L3-4; LBP and L LE pain, s/p surgery   Insurance (Authorized # of Visits):  2/8       (f/u on # [facility])   Authorizing Physician: Dr. Jake Novoa Next MD visit: 1/2020   Fall Risk: standard         Precautions: n/a             Subjective Treatment: 45 min  Total Treatment Time: 45 min

## 2019-10-21 ENCOUNTER — OFFICE VISIT (OUTPATIENT)
Dept: PHYSICAL THERAPY | Age: 69
End: 2019-10-21
Attending: NEUROLOGICAL SURGERY
Payer: MEDICARE

## 2019-10-21 PROCEDURE — 97530 THERAPEUTIC ACTIVITIES: CPT | Performed by: PHYSICAL THERAPIST

## 2019-10-21 PROCEDURE — 97110 THERAPEUTIC EXERCISES: CPT | Performed by: PHYSICAL THERAPIST

## 2019-10-21 PROCEDURE — 97140 MANUAL THERAPY 1/> REGIONS: CPT | Performed by: PHYSICAL THERAPIST

## 2019-10-21 NOTE — PROGRESS NOTES
Dx:      Spondylolisthesis L3-4; LBP and L LE pain, s/p surgery 7/9/2019  Insurance (Authorized # of Visits):  3/8       (f/u on # Daniel Martins)   Authorizing Physician: Dr. Mik Boudreaux Next MD visit: 1/2020   Fall Risk: standard         Precautions: n/a             Delgado L/R  Hooklying isometric abdominal contraction 10 x 3 sec each  Hooklying Isometric gluteal contraction 10 x 3 sec each  Seated spine flex/ext AROM x 5 reps each  There ex:   Seated lumbar flexion 5 x 5 sec each (consider for HEP)  SKC 5 x 5 sec each ( con

## 2019-10-23 ENCOUNTER — OFFICE VISIT (OUTPATIENT)
Dept: PHYSICAL THERAPY | Age: 69
End: 2019-10-23
Attending: NEUROLOGICAL SURGERY
Payer: MEDICARE

## 2019-10-23 PROCEDURE — 97140 MANUAL THERAPY 1/> REGIONS: CPT | Performed by: PHYSICAL THERAPIST

## 2019-10-23 PROCEDURE — 97110 THERAPEUTIC EXERCISES: CPT | Performed by: PHYSICAL THERAPIST

## 2019-10-23 PROCEDURE — 97530 THERAPEUTIC ACTIVITIES: CPT | Performed by: PHYSICAL THERAPIST

## 2019-10-23 NOTE — PROGRESS NOTES
Dx:      Spondylolisthesis L3-4; LBP and L LE pain, s/p surgery 7/9/2019  Insurance (Authorized # of Visits):  4/8       Authorizing Physician: Dr. Jake Ashby MD visit: 1/2020   Fall Risk: standard         Precautions: n/a             Subjective:  7/10 LB flexion, ER ROM     There Ex:   Hooklying LTR 10 x 3 sec each  S/L hip ER 2 x 10 reps each L/R  Hooklying isometric abdominal contraction 10 x 3 sec each  Hooklying Isometric gluteal contraction 10 x 3 sec each  Seated spine flex/ext AROM x 5 reps each  Th

## 2019-10-28 ENCOUNTER — OFFICE VISIT (OUTPATIENT)
Dept: PHYSICAL THERAPY | Age: 69
End: 2019-10-28
Attending: NEUROLOGICAL SURGERY
Payer: MEDICARE

## 2019-10-28 PROCEDURE — 97140 MANUAL THERAPY 1/> REGIONS: CPT | Performed by: PHYSICAL THERAPIST

## 2019-10-28 PROCEDURE — 97110 THERAPEUTIC EXERCISES: CPT | Performed by: PHYSICAL THERAPIST

## 2019-10-28 PROCEDURE — 97530 THERAPEUTIC ACTIVITIES: CPT | Performed by: PHYSICAL THERAPIST

## 2019-10-28 NOTE — PROGRESS NOTES
Dx:      Spondylolisthesis L3-4; LBP and L LE pain, s/p surgery 7/9/2019  Insurance (Authorized # of Visits):  5/8       Authorizing Physician: Dr. Jess Ashby MD visit: 1/2020   Fall Risk: standard         Precautions: n/a             Subjective:  7/10 LB posture education  Walking in  parallel bars c UE A as needed, gait belt/PT contact guard 6 x ~ 12 feet  4 inch step up x 10 reps each L/R c UE A, gait belt donned/PT contact guard  Sit - stand x 10 reps from chair    Manual:   STM B lumbar musculature, B

## 2019-10-30 ENCOUNTER — OFFICE VISIT (OUTPATIENT)
Dept: PHYSICAL THERAPY | Age: 69
End: 2019-10-30
Attending: NEUROLOGICAL SURGERY
Payer: MEDICARE

## 2019-10-30 PROCEDURE — 97110 THERAPEUTIC EXERCISES: CPT | Performed by: PHYSICAL THERAPIST

## 2019-10-30 PROCEDURE — 97140 MANUAL THERAPY 1/> REGIONS: CPT | Performed by: PHYSICAL THERAPIST

## 2019-10-30 NOTE — PROGRESS NOTES
ProgressSummary  Pt has attended 6 visits in Physical Therapy.    Dx:      Spondylolisthesis L3-4; LBP and L LE pain, s/p surgery 7/9/2019  Insurance (Authorized # of Visits):  6/8  (16 requested/recommended)      Authorizing Physician: Dr. Alvarado Ashby MD progressing c PT demonstrating improved lumbar spine AROM, independence c self care, compliance c PT HEP. However, ROM, strength, functional deficits persist as well as c/o pain for which she will benefit from continued care.  She continues to require AD us abduction, flexion, ER ROM Manual:   STM R lumbar musculature/gluteals  L/R hip abduction, flexion, ER ROM Manual:   STM R lumbar musculature/gluteals  L/R hip abduction, flexion, ER ROM      There Ex:   Hooklying LTR 10 x 3 sec each  S/L hip ER 2 x 10 rep needed/appropriate       Patient/Family/Caregiver was advised of these findings, precautions, and treatment options and has agreed to actively participate in planning and for this course of care.     Thank you for your referral. If you have any questions, p

## 2019-11-04 ENCOUNTER — APPOINTMENT (OUTPATIENT)
Dept: PHYSICAL THERAPY | Age: 69
End: 2019-11-04
Attending: NEUROLOGICAL SURGERY
Payer: MEDICARE

## 2019-11-06 ENCOUNTER — OFFICE VISIT (OUTPATIENT)
Dept: PHYSICAL THERAPY | Age: 69
End: 2019-11-06
Attending: NEUROLOGICAL SURGERY
Payer: MEDICARE

## 2019-11-06 PROCEDURE — 97110 THERAPEUTIC EXERCISES: CPT | Performed by: PHYSICAL THERAPIST

## 2019-11-06 PROCEDURE — 97140 MANUAL THERAPY 1/> REGIONS: CPT | Performed by: PHYSICAL THERAPIST

## 2019-11-07 NOTE — PROGRESS NOTES
Dx:      Spondylolisthesis L3-4; LBP and L LE pain, s/p surgery 7/9/2019  Insurance (Authorized # of Visits):  7/16      Authorizing Physician: Dr. Corin Dodge Next MD visit: 1/2020   Fall Risk: standard         Precautions:   hx of lumbar laminectomy, YO peters positions  Reviewed stretches (flex, fig 4) to facilitate don/doff socks/shoes.   Sitting posture education  Walking in  parallel bars c UE A as needed, gait belt/PT contact guard 6 x ~ 12 feet  4 inch step up x 10 reps each L/R c UE A, gait belt donned/PT below. There Ex:   Hooklying isometric gluts, adductors using ball, abdominals x  3  Reps  Bridge c ball for adductor isometric x 2 reps - c/o hamstrings cramps, stopped  Bridge c LE on ball 2 x 10 reps - progress to larger ball next session  Hooklying lum

## 2019-11-11 ENCOUNTER — OFFICE VISIT (OUTPATIENT)
Dept: PHYSICAL THERAPY | Age: 69
End: 2019-11-11
Attending: NEUROLOGICAL SURGERY
Payer: MEDICARE

## 2019-11-11 PROCEDURE — 97110 THERAPEUTIC EXERCISES: CPT | Performed by: PHYSICAL THERAPIST

## 2019-11-11 PROCEDURE — 97140 MANUAL THERAPY 1/> REGIONS: CPT | Performed by: PHYSICAL THERAPIST

## 2019-11-12 NOTE — PROGRESS NOTES
Dx:      Spondylolisthesis L3-4; LBP and L LE pain, s/p surgery 7/9/2019  Insurance (Authorized # of Visits):  8/16      Authorizing Physician: Dr. Jess Ashby MD visit: 1/2020   Fall Risk: standard         Precautions:   hx of lumbar laminectomy, YO peters donned/PT contact guard  Sit - stand x 10 reps from chair -      Manual:   STM R lumbar musculature/gluteals  L/R hip abduction, flexion, ER ROM Manual:   STM R lumbar musculature/gluteals  L/R hip abduction, flexion, ER ROM Manual:   STM R lumbar musculat Standing L/R hip flexor/calf stretch x 30 sec each   Toe tap to 6 and 12 inch steps x 5  reps each L/R c UE to railing  Lateral steps c hands to bar 4 x ~ 12 feet c RTB  Standing L/R hamstrings stretch 2 x 20 sec each     -        HEP: marching c hands t

## 2019-11-13 ENCOUNTER — OFFICE VISIT (OUTPATIENT)
Dept: PHYSICAL THERAPY | Age: 69
End: 2019-11-13
Attending: NEUROLOGICAL SURGERY
Payer: MEDICARE

## 2019-11-13 PROCEDURE — 97140 MANUAL THERAPY 1/> REGIONS: CPT | Performed by: PHYSICAL THERAPIST

## 2019-11-13 PROCEDURE — 97110 THERAPEUTIC EXERCISES: CPT | Performed by: PHYSICAL THERAPIST

## 2019-11-14 NOTE — PROGRESS NOTES
Dx:   Spondylolisthesis L3-4; LBP and L LE pain, s/p surgery 7/9/2019  Insurance (Authorized # of Visits):  9/16      Authorizing Physician: Dr. Santos Martinez Next MD visit: 1/2020   Fall Risk: standard         Precautions:   hx of lumbar laminectomy, R shoulde L; x 10 R, to 12 inc step x 10 R each c unilateral UE A.  HEP review; see below.  There Ex:   Hooklying isometric gluts, adductors using ball, abdominals x  3  Reps  Bridge c ball for adductor isometric x 2 reps - c/o hamstrings cramps, stopped  Bridge c LE min  Total Treatment Time: 48 min

## 2019-11-18 ENCOUNTER — OFFICE VISIT (OUTPATIENT)
Dept: PHYSICAL THERAPY | Age: 69
End: 2019-11-18
Attending: NEUROLOGICAL SURGERY
Payer: MEDICARE

## 2019-11-18 PROCEDURE — 97110 THERAPEUTIC EXERCISES: CPT | Performed by: PHYSICAL THERAPIST

## 2019-11-18 PROCEDURE — 97140 MANUAL THERAPY 1/> REGIONS: CPT | Performed by: PHYSICAL THERAPIST

## 2019-11-18 PROCEDURE — 97530 THERAPEUTIC ACTIVITIES: CPT | Performed by: PHYSICAL THERAPIST

## 2019-11-19 NOTE — PROGRESS NOTES
Dx:   Spondylolisthesis L3-4; LBP and L LE pain, s/p surgery 7/9/2019  Insurance (Authorized # of Visits):  10/16      Authorizing Physician: Dr. Joshua Pina Next MD visit: 1/2020   Fall Risk: standard         Precautions:   hx of lumbar laminectomy, R should Hooklying isometric gluts, adductors using ball, abdominals 10 x 3 sec each  Sit - stand from plinth x 10 reps  Toe tap to 6 inch step 2 x 10 L; x 10 R, to 12 inc step x 10 R each c unilateral UE A.  HEP review; see below.  There Ex:   Hooklying isometric railing  Lateral steps at bar 4 x~12 feet         There Activity: walking in parallel bars, PT SBA  Using aid to don/doff socks; shower brush/sponge on a stick to facilitate washing feet There Activity:   Walking in parallel bars s AD, PT SBA, gait belt do

## 2019-11-20 ENCOUNTER — OFFICE VISIT (OUTPATIENT)
Dept: PHYSICAL THERAPY | Age: 69
End: 2019-11-20
Attending: NEUROLOGICAL SURGERY
Payer: MEDICARE

## 2019-11-20 PROCEDURE — 97140 MANUAL THERAPY 1/> REGIONS: CPT | Performed by: PHYSICAL THERAPIST

## 2019-11-20 PROCEDURE — 97110 THERAPEUTIC EXERCISES: CPT | Performed by: PHYSICAL THERAPIST

## 2019-11-20 PROCEDURE — 97530 THERAPEUTIC ACTIVITIES: CPT | Performed by: PHYSICAL THERAPIST

## 2019-11-21 NOTE — PROGRESS NOTES
Dx:   Spondylolisthesis L3-4; LBP and L LE pain, s/p surgery 7/9/2019  Insurance (Authorized # of Visits):  11/16      Authorizing Physician: Dr. Keily Ashby MD visit: 1/2020   Fall Risk: standard         Precautions:   hx of lumbar laminectomy, R should 10 reps each  Sit - stand from plinth x 10 reps    Standing L/R hip flexor/calf stretch x 30 sec each    Toe tap to 6 and 12 inch steps x 5  reps each L/R c UE to railing  Lateral steps c hands to bar 4 x ~ 12 feet c RTB  Standing L/R hamstrings stretch 2 to countertop,B  Heel/toe raises c hands to countertop. May consider seated lumbar flexion; SKC; figure 4 hip ER ROM. Complete as well tolerated; stop if c/o.    Charges: 1 manual, 1 there ex, 1 there activity   Total Timed Treatment: 45 min  Total Treatmen

## 2019-11-25 ENCOUNTER — OFFICE VISIT (OUTPATIENT)
Dept: PHYSICAL THERAPY | Age: 69
End: 2019-11-25
Attending: NEUROLOGICAL SURGERY
Payer: MEDICARE

## 2019-11-25 NOTE — PROGRESS NOTES
11/22/2019: Patient arrived ~ 10 min late to PT session, then she reported was unable to find house keys Accompanied pt through clinic, to parking lot; were unable to find house keys. She requested to cancel today's PT session.  No treatment/charges for tod

## 2019-11-27 ENCOUNTER — OFFICE VISIT (OUTPATIENT)
Dept: PHYSICAL THERAPY | Age: 69
End: 2019-11-27
Attending: NEUROLOGICAL SURGERY
Payer: MEDICARE

## 2019-11-27 PROCEDURE — 97110 THERAPEUTIC EXERCISES: CPT | Performed by: PHYSICAL THERAPIST

## 2019-11-27 PROCEDURE — 97530 THERAPEUTIC ACTIVITIES: CPT | Performed by: PHYSICAL THERAPIST

## 2019-11-28 RX ORDER — PANTOPRAZOLE SODIUM 40 MG/1
TABLET, DELAYED RELEASE ORAL
Qty: 90 TABLET | Refills: 1 | Status: SHIPPED | OUTPATIENT
Start: 2019-11-28

## 2019-11-28 NOTE — PROGRESS NOTES
Dx:   Spondylolisthesis L3-4; LBP and L LE pain, s/p surgery 7/9/2019  Insurance (Authorized # of Visits):  12/16   - Patient arrived ~15 minutes late to session.   Authorizing Physician: Dr. Rosita Ovalles Next MD visit: 1/2020   Fall Risk: standard - moderate sec each  Bridge c B LE on blue ball 2 x 10 reps  LTR 5 x 5 sec each  Hooklying lumbar abdominal stabilization 3 x 3 sec each, c marching x 10 reps each  Sit - stand from plinth x 10 reps    Standing L/R hip flexor/calf stretch x 30 sec each    Toe tap to brush/sponge on a stick to facilitate washing feet There Activity:   Bed mobility education/practice: sit <-> s/l <-> supine position  Ther activity (gait belt donned, PT SBA/contact guard):   Mini staircase ( 4, ~ 6 inch steps): up/down reciprocally c B U

## 2019-12-02 ENCOUNTER — OFFICE VISIT (OUTPATIENT)
Dept: PHYSICAL THERAPY | Age: 69
End: 2019-12-02
Attending: NEUROLOGICAL SURGERY
Payer: MEDICARE

## 2019-12-02 PROCEDURE — 97530 THERAPEUTIC ACTIVITIES: CPT | Performed by: PHYSICAL THERAPIST

## 2019-12-02 PROCEDURE — 97110 THERAPEUTIC EXERCISES: CPT | Performed by: PHYSICAL THERAPIST

## 2019-12-02 NOTE — PROGRESS NOTES
Dx:   Spondylolisthesis L3-4; LBP and L LE pain, s/p surgery 7/9/2019  Insurance (Authorized # of Visits):  13/16   - Patient arrived ~15 minutes late to session.   Authorizing Physician: Dr. Nazario Ashby MD visit: 1/2020   Fall Risk: standard - moderate musculature/gluteals  L/R hip abduction, flexion, ER ROM Manual:   STM R lumbar musculature/gluteals  L/R hip abduction, flexion, ER ROM Manual:   STM R lumbar musculature/gluteals   There Ex:   B DKHATTIE c LE supported on ball 10 x 5 sec each  Bridge c B VALERI o stretch 2 x 20 sec each  Lateral steps c RTB proximal to knees 3 x~ 16 feet each    There Activity: walking in parallel bars, PT SBA  Using aid to don/doff socks; shower brush/sponge on a stick to facilitate washing feet There Activity:   Walking in parall

## 2019-12-04 ENCOUNTER — OFFICE VISIT (OUTPATIENT)
Dept: PHYSICAL THERAPY | Age: 69
End: 2019-12-04
Attending: NEUROLOGICAL SURGERY
Payer: MEDICARE

## 2019-12-04 PROCEDURE — 97110 THERAPEUTIC EXERCISES: CPT | Performed by: PHYSICAL THERAPIST

## 2019-12-04 PROCEDURE — 97530 THERAPEUTIC ACTIVITIES: CPT | Performed by: PHYSICAL THERAPIST

## 2019-12-04 NOTE — PROGRESS NOTES
Dx:   Spondylolisthesis L3-4; LBP and L LE pain, s/p surgery 7/9/2019  Insurance (Authorized # of Visits):  13/16   - Patient arrived ~10 minutes late to session.   Authorizing Physician: Dr. Cheyenne Lu Next MD visit: 1/2020   Fall Risk: standard - moderate completed, c/o LBP)  Lateral steps at bar 4 x ~12 feet c RTB  Standing hip flexor/calf stretch 2 x 30 sec each using staircase  6 inch step up x 10 reps each   There Ex:   Standing B ankle PF/DF x 10 reps  Lateral steps at bar c RTB 4 x ~16 feet each There

## 2019-12-09 ENCOUNTER — OFFICE VISIT (OUTPATIENT)
Dept: PHYSICAL THERAPY | Age: 69
End: 2019-12-09
Attending: NEUROLOGICAL SURGERY
Payer: MEDICARE

## 2019-12-09 PROCEDURE — 97140 MANUAL THERAPY 1/> REGIONS: CPT | Performed by: PHYSICAL THERAPIST

## 2019-12-09 PROCEDURE — 97110 THERAPEUTIC EXERCISES: CPT | Performed by: PHYSICAL THERAPIST

## 2019-12-09 PROCEDURE — 97530 THERAPEUTIC ACTIVITIES: CPT | Performed by: PHYSICAL THERAPIST

## 2019-12-10 NOTE — PROGRESS NOTES
ProgressSummary  Pt has attended 15 visits in Physical Therapy.    Dx:   Spondylolisthesis L3-4; LBP and L LE pain, s/p surgery 7/9/2019  Insurance (Authorized # of Visits):  15/16 (10 more requested for 25 total)  Authorizing Physician: Dr. Jessica Shea A.  Balance: SLS - L 5 sec, R 3 sec       Assessment: Patient continues to progress c PT, demonstrating improvements in LE strength, balance, function, independence c transfers.   Hip/lumbar stabilizer weakness persists as well as balance deficits, c/o pain Standing B ankle PF/DF x 10 reps  Lateral steps at bar c RTB 4 x ~16 feet each There Ex:   LTR 5 x 5 sec each  Bridge on blue ball x 15 reps -  C/o LE cramping, stopped  Supine L hamstrings stretch 2 x 20 sec each  Lateral steps c RTB proximal to knees 3 seated lumbar flexion; SKC; figure 4 hip ER ROM. Complete as well tolerated; stop if c/o.    Charges: 1 there ex, 1 manual, 1 TA Total Timed Treatment: 40 min  Total Treatment Time: 50 min    FOTO: -    Plan: Continue skilled Physical Therapy 2 x/week or a

## 2019-12-13 ENCOUNTER — OFFICE VISIT (OUTPATIENT)
Dept: PHYSICAL THERAPY | Age: 69
End: 2019-12-13
Attending: INTERNAL MEDICINE
Payer: MEDICARE

## 2019-12-13 PROCEDURE — 97530 THERAPEUTIC ACTIVITIES: CPT | Performed by: PHYSICAL THERAPIST

## 2019-12-13 PROCEDURE — 97110 THERAPEUTIC EXERCISES: CPT | Performed by: PHYSICAL THERAPIST

## 2019-12-13 PROCEDURE — 97140 MANUAL THERAPY 1/> REGIONS: CPT | Performed by: PHYSICAL THERAPIST

## 2019-12-13 NOTE — PROGRESS NOTES
Dx:   Spondylolisthesis L3-4; LBP and L LE pain, s/p surgery 7/9/2019  Insurance (Authorized # of Visits): 16/25 total  Authorizing Physician: Dr. Jake Ashby MD visit: 1/2020   Fall Risk: standard - moderate         Precautions:   hx of lumbar laminecto STM R lumbar musculature/gluteals Manual:   STM R lumbar musculature/gluteals  L/R hip ROM flex, abd, ER      There Ex:   LTR 5 x 5 sec each  Bridge on blue ball x 15 reps -  C/o LE cramping, stopped  Supine L hamstrings stretch 2 x 20 sec each  Lateral gait belt donned   HEP: marching c hands to countertop, hip ext/hip abd each c hands to countertop, mini squat c hands to countertop,B  Heel/toe raises c hands to countertop. May consider seated lumbar flexion; SKC; figure 4 hip ER ROM.  Complete as well to

## 2019-12-17 NOTE — TELEPHONE ENCOUNTER
"Sports Medicine Clinic Visit    PCP: Jesse Edgar BRII Harvey is a 72 year old male who is seen in f/u up for Chronic pain of both shoulders. Since last visit on 11/9/2019 patient has undergone bilateral shoulder MRI's.    Would like to discuss possible injection for the right shoulder.      Feels that his left shoulder has been improving, as he feels he does have more function than previous.    **  No interval injury.    Review of Systems  All other systems reviewed and are negative unless noted above.    Past Medical History:   Diagnosis Date     Alcohol abuse     Quit over 25 yrs ago     Arthritis      CKD (chronic kidney disease) stage 3, GFR 30-59 ml/min (H) 10/23/2018     Coronary artery disease involving native heart with angina pectoris, unspecified vessel or lesion type (H) 4/1/2016     Drug abuse (H)     Quit over 25 yrs ago     Headaches      Hyperlipidemia LDL goal <130 4/26/2013     Hypertension      Idiopathic peripheral neuropathy 10/23/2018     Major depressive disorder, recurrent episode, mild (H) 10/18/2016     Past Surgical History:   Procedure Laterality Date     APPENDECTOMY       COLONOSCOPY WITH CO2 INSUFFLATION N/A 12/14/2018    Procedure: COLONOSCOPY WITH CO2 INSUFFLATION;  Surgeon: Duane, William Charles, MD;  Location: [facility]     ORTHOPEDIC SURGERY Right     Scar over dorsum aspect of right wrist.      ORTHOPEDIC SURGERY Right     Knee     ORTHOPEDIC SURGERY Bilateral     CTS release     SURGICAL HISTORY OF -       Umbilical Hernia     SURGICAL HISTORY OF -   2013    Coronary Stent       Objective  /62   Ht 1.778 m (5' 10\")   Wt 115.2 kg (254 lb)   BMI 36.45 kg/m      GENERAL APPEARANCE: healthy, alert and no distress   GAIT: NORMAL  SKIN: no suspicious lesions or rashes  NEURO: Normal strength and tone, mentation intact and speech normal  PSYCH:  mentation appears normal and affect normal/bright  HEENT: no scleral icterus  CV: distal perfusion intact  RESP: " I spoke with this patient to reschedule her procedure which I gave her a date of 1/4/19 (on hold) but she wanted to check with her daughter regarding a ride. I will CB tomorrow to confirm date. nonlabored breathing      Exam  Bilateral Shoulder exam    ROM:        forward flexion: ~130 degrees on right, nearly degrees on left with some shoulder hiking        Abduction: nearly 90 deg with shoulder hiking on left, ~110 degrees on right     Skin:       no visible deformities       well perfused       capillary refill brisk    Sensation:        normal sensation over shoulder and upper extremity         Radiology  Visualized MRI of the bilateral shoulder, and reviewed images and report with patient.  MRI demonstrates right with cuff tendinopathy, partial thickness tearing; left with full thickness tear with retraction.      MR SHOULDER RIGHT WITHOUT CONTRAST  12/7/2019 9:28 AM     HISTORY: Shoulder pain, rotator cuff tear/impingement suspected;  evaluate rotator cuff; fall six months ago. Persistent weakness and  decreased range of motion.     COMPARISON: 11/19/2019 x-rays.     TECHNIQUE: Coronal oblique T1, T2, and fat suppressed T2, sagittal  oblique T2, and transverse proton density and T2 weighted images.     FINDINGS: Images mildly degraded by motion.     Osseous acromial outlet: There is a type II subacromial configuration.  Mild subacromial spur/enthesophyte. Moderate AC degenerative arthrosis  and marginal hypertrophic change. Findings would correlate with  subacromial impingement. Subcoracoid interval patent.     Rotator cuff: Moderate to severe supraspinatus tendinopathy with  partial-thickness articular surface tear central footprint measuring  12 mm AP width x 12 mm mediolateral length involving 50-75% tendon  thickness with intact bursal surface fibers. No evidence for  full-thickness extension or retraction.     Moderate infraspinatus and subscapularis tendinopathy without discrete  tear. Mild interstitial cystic delamination within the infraspinatus  myotendinous junction with small dissecting cysts.     Mild supraspinatus muscle atrophy. Musculature otherwise preserved.     Labral Structures:  Degeneration and tearing posterior labrum.     Biceps Tendon: Moderate to severe tendinopathy with partial-thickness  longitudinal split tearing. No subluxation.     Osseous structures: Mild resorptive cysts are noted along the  anatomical neck of the humerus beneath the infraspinatus tendon  attachment. Marrow signal about the shoulder is otherwise  unremarkable. No significant/visible chondromalacia allowing for  blurring from motion. No fracture or osseous lesion.     Joint space: Mild joint effusion and synovitis.     Additional findings: Mild fluid and synovitis subacromial/subdeltoid  bursa.                                                                       IMPRESSION:   1. Moderate/severe supraspinatus tendinopathy with partial-thickness  articular surface tear. Mild muscle atrophy.  2. Infraspinatus and subscapularis tendinopathy without discrete tear.  Mild interstitial cystic changes infraspinatus myotendinous junction.  Remainder of musculature preserved.  3. Narrowing supraspinatus outlet would correlate with subacromial  impingement.  4. Mild subacromial/subdeltoid bursitis.  5. Biceps tendinopathy and interstitial longitudinal split tearing  without subluxation.  6. Degeneration and tearing posterior labrum.     NASREEN CORTES MD  =========================    MR SHOULDER LEFT WITHOUT CONTRAST  12/7/2019 9:28 AM     HISTORY:  Shoulder pain, rotator cuff tear/impingement suspected.  Evaluate rotator cuff. Chronic pain of both shoulders. Fall about six  months ago. Weakness and decreased range of motion.     COMPARISON: 11/19/2019 x-rays.     TECHNIQUE: Coronal oblique T1, T2, and fat suppressed T2, sagittal  oblique T2, and transverse proton density and T2 weighted images.     FINDINGS:   Osseous acromial outlet: There is a acquired type III subacromial  configuration. Moderate anterior subacromial spur/enthesophyte.  Moderate AC joint arthrosis and inferior hypertrophic changes.  Superior  migration humeral head with severe narrowing of the  acromiohumeral space. Patent subcoracoid interval.     Rotator cuff: Complete full thickness full width supraspinatus and  infraspinatus tears with retraction tendon edge up to 5.5 cm to the  superior glenoid. Severe supraspinatus and moderate infraspinatus  muscle atrophy. Teres minor muscle and tendon remain intact.     Tendinopathy and ill-defined partial thickness tearing upper  subscapularis tendon. No full thickness extension or fiber retraction.  Subscapularis muscle preserved.     Labral Structures: Labrum blurred due to motion with evidence for  degeneration and degenerative tearing of the posterior and superior  labrum. No paralabral cyst.     Biceps Tendon: Moderate to severe tendinopathy, surface fraying, and  probable partial thickness longitudinal split tearing in the rotator  interval without subluxation.     Osseous structures: No bone contusion/bone marrow edema, fracture, or  osseous lesion. Mild chronic resorptive change greater tuberosity.  Localized area of severe chondromalacia superior humeral head near the  rotator cuff footprint and broad-based moderate chondromalacia in the  glenoid. Cartilage is blurred due to motion.     Joint space: Moderate joint effusion with tenosynovitis joint  recesses.     Additional findings: Moderate fluid and synovitis in the overlying  subacromial/subdeltoid and subscapularis bursae.                                                                       IMPRESSION:   1. Retracted complete full thickness full width supraspinatus and  infraspinatus tendon tears. Severe supraspinatus and moderate  infraspinatus muscle atrophy.  2. Tendinopathy and indistinct partial thickness tearing upper  subscapularis footprint.  3. Biceps tendinopathy, surface fraying, and probable longitudinal  split tearing of the rotator interval. No subluxation.  4. Severe narrowing coracoacromial arch would correlate with  subacromial  impingement.  5. No acute osseous pathology. Glenohumeral joint chondromalacia.  6. Moderate joint effusion and synovitis. Fluid and synovitis in the  overlying bursae.  7. Degeneration/tearing superior and posterior labrum.     NASREEN CORTES MD    Assessment:  1. Chronic pain of both shoulders    2. Complete tear of left rotator cuff, unspecified whether traumatic    3. Incomplete tear of right rotator cuff, unspecified whether traumatic        Plan:  Discussed the assessment with the patient.  Reviewed MRI scans.   On left, with full thickness tearing, discussed potential for referral to ortho surgeon; suggested this, at least for better understanding of surgical options. Alternatively consider physical therapy, steroid injection, but not curative; he prefers to monitor for now, noting some improvement.   On right, desires subacromial steroid injection, done today. See note below. Offered PT, declined.  May contact clinic for PT referral.  Follow up: contact clinic 2-3 weeks if not improving with injection, sooner prn.  Questions answered. The patient indicates understanding of these issues and agrees with the plan.    Jeevan Diego DO, MIRNA        Large Joint Injection/Arthocentesis: R subacromial bursa  Date/Time: 12/17/2019 10:36 AM  Performed by: Jeevan Diego DO  Authorized by: Jeevan Diego DO     Indications:  Pain  Needle Size:  25 G  Guidance: landmark guided    Approach:  Posterolateral  Location:  Shoulder      Site:  R subacromial bursa  Medications:  2 mL lidocaine 1 %; 40 mg triamcinolone 40 MG/ML  Outcome:  Tolerated well, no immediate complications  Procedure discussed: discussed risks, benefits, and alternatives    Timeout: timeout called immediately prior to procedure    Prep: patient was prepped and draped in usual sterile fashion              Patient Instructions   Left rotator cuff tear; right rotator cuff degenerative changes, partial tear  Right subacromial steroid  injection done today  Future considerations include physical therapy, injection left shoulder, and referral to orthopedic surgeon       Injection Discharge Instructions      You may shower, however avoid swimming, tub baths or hot tubs for 24 hours following your procedure    You may have a mild to moderate increase in pain for several days following the injection.    It may take up to 14 days for the steroid medication to start working although you may feel the effect as early as a few days after the procedure.    You may use ice packs for 10-15 minutes, 3 to 4 times a day at the injection site for comfort    You may use anti-inflammatory medications (such as Ibuprofen or Aleve or Advil) or Tylenol for pain control if necessary    If you were fasting, you may resume your normal diet and medications after the procedure    If you have diabetes, check your blood sugar more frequently than usual as your blood sugar may be higher than normal for 10-14 days following a steroid injection. Contact your doctor who manages your diabetes if your blood sugar is higher than usual      If you experience any of the following, call [facility] @ [phone]   -Fever over 100 degree F  -Swelling, bleeding, redness, drainage, warmth at the injection site  - New or worsening pain            Disclaimer: This note consists of symbols derived from keyboarding, dictation and/or voice recognition software. As a result, there may be errors in the script that have gone undetected. Please consider this when interpreting information found in this chart.

## 2019-12-20 ENCOUNTER — APPOINTMENT (OUTPATIENT)
Dept: PHYSICAL THERAPY | Age: 69
End: 2019-12-20
Attending: INTERNAL MEDICINE
Payer: MEDICARE

## 2019-12-27 ENCOUNTER — OFFICE VISIT (OUTPATIENT)
Dept: PHYSICAL THERAPY | Age: 69
End: 2019-12-27
Attending: NEUROLOGICAL SURGERY
Payer: MEDICARE

## 2019-12-27 PROCEDURE — 97112 NEUROMUSCULAR REEDUCATION: CPT | Performed by: PHYSICAL THERAPIST

## 2019-12-27 PROCEDURE — 97110 THERAPEUTIC EXERCISES: CPT | Performed by: PHYSICAL THERAPIST

## 2019-12-27 PROCEDURE — 97140 MANUAL THERAPY 1/> REGIONS: CPT | Performed by: PHYSICAL THERAPIST

## 2019-12-27 NOTE — PROGRESS NOTES
Dx:   Spondylolisthesis L3-4; LBP and L LE pain, s/p surgery 7/9/2019  Insurance (Authorized # of Visits): 25 total  Authorizing Physician: Dr. Peterson Saleem Next MD visit: 1/2020   Fall Risk: standard - moderate         Precautions:   hx of lumbar laminectomy, lumbar musculature/gluteals Manual:   STM R lumbar musculature/gluteals Manual:   STM R lumbar musculature/gluteals  L/R hip ROM flex, abd, ER    Manual:   STM R lumbar musculature/gluteals  L/R hip ROM flex, abd, ER   There Ex:   LTR 5 x 5 sec each  Bridg reciprocally c L/R UE A only x 3      There Activity:   Mini staircase ( 4, ~ 6 inch steps): up/down reciprocally c L/R UE A only x 6 - progress next session   Practice/pt education: Sit - S/L - supine transfer There Activity:   Pt requested that I show he

## 2020-01-10 ENCOUNTER — OFFICE VISIT (OUTPATIENT)
Dept: PHYSICAL THERAPY | Age: 70
End: 2020-01-10
Attending: NEUROLOGICAL SURGERY
Payer: MEDICARE

## 2020-01-10 PROCEDURE — 97110 THERAPEUTIC EXERCISES: CPT | Performed by: PHYSICAL THERAPIST

## 2020-01-10 PROCEDURE — 97112 NEUROMUSCULAR REEDUCATION: CPT | Performed by: PHYSICAL THERAPIST

## 2020-01-10 NOTE — PROGRESS NOTES
ProgressSummary  Pt has attended 18 visits in Physical Therapy.    Dx:   Spondylolisthesis L3-4; LBP and L LE pain, s/p surgery 7/9/2019  Insurance (Authorized # of Visits): 25 total  Authorizing Physician: Dr. Jarocho Otoole Next MD visit: 1/20/2020   Fall Risk: c PT contact guard. Function: Patient is independent in transfers: sit - stand; supine - S/L. She is able to reciprocally negotiate 6 inch steps up/down c min UE A, some LE weakness noted descending steps.   Balance: SLS - L 5 sec, R 5 sec  - c/o R LBP - STM R lumbar musculature/gluteals   There Ex:   LTR 5 x 5 sec each  Bridge on blue ball x 15 reps -  C/o LE cramping, stopped  Supine L hamstrings stretch 2 x 20 sec each  Lateral steps c RTB proximal to knees 3 x~ 16 feet each There Ex:   Stationary bic for cramp  Mini staircase ( 4, ~ 6 inch steps): up/down reciprocally c L/R UE A only x 3      There Activity:   Mini staircase ( 4, ~ 6 inch steps): up/down reciprocally c L/R UE A only x 6 - progress next session   Practice/pt education: Sit - S/L - supin plan of treatment and while under my care.     X___________________________________________________ Date____________________    Certification From: 9/21/7209  To:4/9/2020

## 2020-01-13 ENCOUNTER — OFFICE VISIT (OUTPATIENT)
Dept: PHYSICAL THERAPY | Age: 70
End: 2020-01-13
Attending: NEUROLOGICAL SURGERY
Payer: MEDICARE

## 2020-01-13 PROCEDURE — 97110 THERAPEUTIC EXERCISES: CPT | Performed by: PHYSICAL THERAPIST

## 2020-01-13 NOTE — PROGRESS NOTES
Dx:   Spondylolisthesis L3-4; LBP and L LE pain, s/p surgery 7/9/2019  Insurance (Authorized # of Visits):  Re-assess every 10 visits  Authorizing Physician: Dr. Viji Ashby MD visit: 1/20/2020   Fall Risk: standard - moderate         Precautions:   hx o abd, ER   There Ex:   UBE/LE ergometer: 2 min forward/2 min backward, level 4 ( 4 min total)  Supine lumbar stabilization c contralateral hip/shoulder flexion - c/o cramp, stopped  Standing L/R hip flexor/gastroc stretch  x 30 sec each c UE A  4 inch later donned NM Re-education:   Gait training c wider base SPC, height adequate, unable to lower cane height  further  NM Re-education:   Gait training s AD: in between parallel bars and c PT contact guard/gait belt donned    HEP modification: marching c hands t

## 2020-01-23 DIAGNOSIS — M54.32 SCIATICA OF LEFT SIDE: ICD-10-CM

## 2020-01-24 ENCOUNTER — OFFICE VISIT (OUTPATIENT)
Dept: PHYSICAL THERAPY | Age: 70
End: 2020-01-24
Attending: NEUROLOGICAL SURGERY
Payer: MEDICARE

## 2020-01-24 PROCEDURE — 97112 NEUROMUSCULAR REEDUCATION: CPT | Performed by: PHYSICAL THERAPIST

## 2020-01-24 PROCEDURE — 97110 THERAPEUTIC EXERCISES: CPT | Performed by: PHYSICAL THERAPIST

## 2020-01-24 PROCEDURE — 97140 MANUAL THERAPY 1/> REGIONS: CPT | Performed by: PHYSICAL THERAPIST

## 2020-01-26 RX ORDER — GABAPENTIN 300 MG/1
600 CAPSULE ORAL 3 TIMES DAILY
Qty: 180 CAPSULE | Refills: 0 | Status: SHIPPED | OUTPATIENT
Start: 2020-01-26

## 2020-01-27 ENCOUNTER — OFFICE VISIT (OUTPATIENT)
Dept: PHYSICAL THERAPY | Age: 70
End: 2020-01-27
Attending: NEUROLOGICAL SURGERY
Payer: MEDICARE

## 2020-01-27 PROCEDURE — 97140 MANUAL THERAPY 1/> REGIONS: CPT | Performed by: PHYSICAL THERAPIST

## 2020-01-27 PROCEDURE — 97110 THERAPEUTIC EXERCISES: CPT | Performed by: PHYSICAL THERAPIST

## 2020-01-27 NOTE — PROGRESS NOTES
Dx:   Spondylolisthesis L3-4; LBP and L LE pain, s/p surgery 7/9/2019  Insurance (Authorized # of Visits):  Re-assess every 10 visits or 30 days  Authorizing Physician: Dr. Santos Martinez Next MD visit: 1/20/2020   Fall Risk: standard - moderate         Precauti Manual:   STM R lumbar musculature/gluteals Manual:   STM R lumbar musculature/gluteals  L/R hip ROM flex, abd, ER Manual:   STM R lumbar musculature/gluteals  L/R hip ROM flex, abd, ER  STM L/R ITB c foam roller    Manual:   STM R lumbar musculature/glute Tandem balance inbetween parallel bars 2 x 30 sec    HEP: marching c hands to countertop, hip ext/hip abd each c hands to countertop, mini squat c hands to countertop,B  Heel/toe raises c hands to countertop. Supine hip flexor stretch L/R.  May consider

## 2020-01-31 ENCOUNTER — OFFICE VISIT (OUTPATIENT)
Dept: PHYSICAL THERAPY | Age: 70
End: 2020-01-31
Attending: NEUROLOGICAL SURGERY
Payer: MEDICARE

## 2020-01-31 PROCEDURE — 97110 THERAPEUTIC EXERCISES: CPT | Performed by: PHYSICAL THERAPIST

## 2020-02-12 ENCOUNTER — OFFICE VISIT (OUTPATIENT)
Dept: PHYSICAL THERAPY | Age: 70
End: 2020-02-12
Attending: NEUROLOGICAL SURGERY
Payer: MEDICARE

## 2020-02-12 PROCEDURE — 97110 THERAPEUTIC EXERCISES: CPT | Performed by: PHYSICAL THERAPIST

## 2020-02-12 PROCEDURE — 97112 NEUROMUSCULAR REEDUCATION: CPT | Performed by: PHYSICAL THERAPIST

## 2020-02-12 PROCEDURE — 97140 MANUAL THERAPY 1/> REGIONS: CPT | Performed by: PHYSICAL THERAPIST

## 2020-02-12 NOTE — PROGRESS NOTES
ProgressSummary  Pt has attended 23 visits in Physical Therapy. Dx:   Spondylolisthesis L3-4; LBP and L LE pain, s/p surgery 7/9/2019  Insurance (Authorized # of Visits):  Re-assess every 10 visits or 30 days  Authorizing Physician: Dr. Mik Ashby Patient is independent in transfers: sit - stand; supine - S/L c some c/o discomfort. She is able to reciprocally negotiate 8 inch steps up/down c min UE A, some LE weakness noted descending steps.   Balance: SLS -     Assessment:  Patient returns to PT aft musculature/gluteals  L/R hip ROM flex, abd, ER  STM L/R ITB c foam roller    Manual:   STM R lumbar musculature/gluteals  L/R hip ROM flex, abd, ER  STM L/R ITB c foam roller    Manual:   STM/DTM R lumbar musculature/gluteals      Manual:   STM/DTM R lumb between parallel bars - next session      NM Re-ed (gait belt donned, PT SBA/contact guard):    Tandem balance inbetween parallel bars 2 x 30 sec    Standing shoulder width and  narrow DEE x  30 sec in between parallel bars, completed with eyes open/eyes cl

## 2020-02-14 ENCOUNTER — APPOINTMENT (OUTPATIENT)
Dept: PHYSICAL THERAPY | Age: 70
End: 2020-02-14
Attending: NEUROLOGICAL SURGERY
Payer: MEDICARE

## 2020-02-21 ENCOUNTER — OFFICE VISIT (OUTPATIENT)
Dept: PHYSICAL THERAPY | Age: 70
End: 2020-02-21
Attending: NEUROLOGICAL SURGERY
Payer: MEDICARE

## 2020-02-21 PROCEDURE — 97110 THERAPEUTIC EXERCISES: CPT | Performed by: PHYSICAL THERAPIST

## 2020-02-21 PROCEDURE — 97112 NEUROMUSCULAR REEDUCATION: CPT | Performed by: PHYSICAL THERAPIST

## 2020-02-21 PROCEDURE — 97140 MANUAL THERAPY 1/> REGIONS: CPT | Performed by: PHYSICAL THERAPIST

## 2020-02-21 NOTE — PROGRESS NOTES
Dx:   Spondylolisthesis L3-4; LBP and L LE pain, s/p surgery 7/9/2019  Insurance (Authorized # of Visits):  Re-assess every 10 visits or 30 days  Authorizing Physician: Dr. Christina Castillo Next MD visit: 1/20/2020   Fall Risk: standard - moderate         Precaut musculature/gluteals  L/R hip ROM flex, abd, ER Manual:   STM R lumbar musculature/gluteals  L/R hip ROM flex, abd, ER  STM L/R ITB c foam roller    Manual:   STM R lumbar musculature/gluteals  L/R hip ROM flex, abd, ER  STM L/R ITB c foam roller    Manual donned  Standing L/R sciatic nerve glides using staircase x 10 reps each  Standing hip flexor/calf stretch x 30 sec each  Hooklying TA lumbar stabilization with contralateral hip/shoulder flexion x 10 reps, modified dead bug x 10 reps   Bridge with pillow

## 2020-02-24 ENCOUNTER — APPOINTMENT (OUTPATIENT)
Dept: PHYSICAL THERAPY | Age: 70
End: 2020-02-24
Attending: NEUROLOGICAL SURGERY
Payer: MEDICARE

## 2020-02-26 ENCOUNTER — OFFICE VISIT (OUTPATIENT)
Dept: PHYSICAL THERAPY | Age: 70
End: 2020-02-26
Attending: NEUROLOGICAL SURGERY
Payer: MEDICARE

## 2020-02-26 ENCOUNTER — TELEPHONE (OUTPATIENT)
Dept: PHYSICAL THERAPY | Age: 70
End: 2020-02-26

## 2020-02-26 PROCEDURE — 97110 THERAPEUTIC EXERCISES: CPT | Performed by: PHYSICAL THERAPIST

## 2020-02-26 PROCEDURE — 97112 NEUROMUSCULAR REEDUCATION: CPT | Performed by: PHYSICAL THERAPIST

## 2020-02-26 NOTE — PROGRESS NOTES
Dx:   Spondylolisthesis L3-4; LBP and L LE pain, s/p surgery 7/9/2019  Insurance (Authorized # of Visits):  Re-assess every 10 visits or 30 days  Authorizing Physician: Dr. Ravin Ashby MD visit: 1/20/2020   Fall Risk: standard - moderate         Precaut R lumbar musculature/gluteals      Manual:   STM/DTM R lumbar musculature/gluteals     There Ex:   Stationary bike: level 3-6 x 4 min  Hooklying B hip ER c GTB x 30 reps  Hooklying figure 4 stretch x 30 sec each L/R  Supine L/R hip flexor/quad stretch x 30 c/o LB, stopped   Bridge with pillow for adductor isometric 2 x 10 reps -insufficient time, not completed   NM Re-ed:   Tandem balance inbetween parallel bars 2 x 30 sec  NM Re-ed (gait belt donned, PT SBA)  Tandem balance inbetween parallel bars 2 x 30 se

## 2020-03-01 DIAGNOSIS — M54.32 SCIATICA OF LEFT SIDE: ICD-10-CM

## 2020-03-02 ENCOUNTER — APPOINTMENT (OUTPATIENT)
Dept: PHYSICAL THERAPY | Age: 70
End: 2020-03-02
Attending: NEUROLOGICAL SURGERY
Payer: MEDICARE

## 2020-03-03 RX ORDER — GABAPENTIN 300 MG/1
600 CAPSULE ORAL 3 TIMES DAILY
Qty: 180 CAPSULE | Refills: 0 | OUTPATIENT
Start: 2020-03-03

## 2020-03-04 ENCOUNTER — APPOINTMENT (OUTPATIENT)
Dept: PHYSICAL THERAPY | Age: 70
End: 2020-03-04
Attending: NEUROLOGICAL SURGERY
Payer: MEDICARE

## 2020-03-09 ENCOUNTER — APPOINTMENT (OUTPATIENT)
Dept: PHYSICAL THERAPY | Age: 70
End: 2020-03-09
Attending: NEUROLOGICAL SURGERY
Payer: MEDICARE

## 2020-03-09 ENCOUNTER — TELEPHONE (OUTPATIENT)
Dept: PHYSICAL THERAPY | Age: 70
End: 2020-03-09

## 2020-03-17 ENCOUNTER — TELEPHONE (OUTPATIENT)
Dept: PHYSICAL THERAPY | Age: 70
End: 2020-03-17

## 2020-03-26 ENCOUNTER — TELEPHONE (OUTPATIENT)
Dept: PHYSICAL THERAPY | Age: 70
End: 2020-03-26

## 2020-03-26 NOTE — TELEPHONE ENCOUNTER
Contacted pt to discuss department's initiative to protect all non-essential and high risk patients from COVID-19 exposure. Left voicemail due to no answer. Advised may continue with PT HEP, as long as going well.  Explained that the clinic is cancelling vi

## 2020-03-27 ENCOUNTER — APPOINTMENT (OUTPATIENT)
Dept: PHYSICAL THERAPY | Age: 70
End: 2020-03-27
Attending: NEUROLOGICAL SURGERY
Payer: MEDICARE

## 2020-03-30 ENCOUNTER — APPOINTMENT (OUTPATIENT)
Dept: PHYSICAL THERAPY | Age: 70
End: 2020-03-30
Payer: MEDICARE

## 2020-04-01 ENCOUNTER — APPOINTMENT (OUTPATIENT)
Dept: PHYSICAL THERAPY | Age: 70
End: 2020-04-01
Payer: MEDICARE

## 2020-04-06 ENCOUNTER — APPOINTMENT (OUTPATIENT)
Dept: PHYSICAL THERAPY | Age: 70
End: 2020-04-06
Payer: MEDICARE

## 2020-04-08 ENCOUNTER — APPOINTMENT (OUTPATIENT)
Dept: PHYSICAL THERAPY | Age: 70
End: 2020-04-08
Payer: MEDICARE

## 2020-04-09 ENCOUNTER — TELEPHONE (OUTPATIENT)
Dept: PHYSICAL THERAPY | Age: 70
End: 2020-04-09

## 2020-04-13 ENCOUNTER — APPOINTMENT (OUTPATIENT)
Dept: PHYSICAL THERAPY | Age: 70
End: 2020-04-13
Attending: INTERNAL MEDICINE
Payer: MEDICARE

## 2020-04-15 ENCOUNTER — APPOINTMENT (OUTPATIENT)
Dept: PHYSICAL THERAPY | Age: 70
End: 2020-04-15
Attending: INTERNAL MEDICINE
Payer: MEDICARE

## 2020-04-15 ENCOUNTER — TELEPHONE (OUTPATIENT)
Dept: PHYSICAL THERAPY | Age: 70
End: 2020-04-15

## 2020-04-20 ENCOUNTER — APPOINTMENT (OUTPATIENT)
Dept: PHYSICAL THERAPY | Age: 70
End: 2020-04-20
Attending: INTERNAL MEDICINE
Payer: MEDICARE

## 2020-04-22 ENCOUNTER — APPOINTMENT (OUTPATIENT)
Dept: PHYSICAL THERAPY | Age: 70
End: 2020-04-22
Payer: MEDICARE

## 2020-04-27 ENCOUNTER — APPOINTMENT (OUTPATIENT)
Dept: PHYSICAL THERAPY | Age: 70
End: 2020-04-27
Payer: MEDICARE

## 2020-04-29 ENCOUNTER — APPOINTMENT (OUTPATIENT)
Dept: PHYSICAL THERAPY | Age: 70
End: 2020-04-29
Payer: MEDICARE

## 2020-05-05 ENCOUNTER — TELEPHONE (OUTPATIENT)
Dept: PHYSICAL THERAPY | Age: 70
End: 2020-05-05

## 2020-05-05 NOTE — TELEPHONE ENCOUNTER
Contacted pt to explain that due to continued COVID-19 outbreak/[place] Shelter-in-place order, non-essential outpatient rehab patient care has been delayed until 6/1/2020.   Future appts will continue to be determined as the situation progresses, and plan to re

## 2020-05-18 ENCOUNTER — APPOINTMENT (OUTPATIENT)
Dept: PHYSICAL THERAPY | Age: 70
End: 2020-05-18
Attending: INTERNAL MEDICINE
Payer: MEDICARE

## 2020-05-20 ENCOUNTER — APPOINTMENT (OUTPATIENT)
Dept: PHYSICAL THERAPY | Age: 70
End: 2020-05-20
Attending: INTERNAL MEDICINE
Payer: MEDICARE

## 2020-05-27 ENCOUNTER — APPOINTMENT (OUTPATIENT)
Dept: PHYSICAL THERAPY | Age: 70
End: 2020-05-27
Attending: INTERNAL MEDICINE
Payer: MEDICARE

## 2020-05-28 ENCOUNTER — TELEPHONE (OUTPATIENT)
Dept: PHYSICAL THERAPY | Age: 70
End: 2020-05-28

## 2020-06-01 ENCOUNTER — APPOINTMENT (OUTPATIENT)
Dept: PHYSICAL THERAPY | Age: 70
End: 2020-06-01
Attending: INTERNAL MEDICINE
Payer: MEDICARE

## 2020-06-03 ENCOUNTER — APPOINTMENT (OUTPATIENT)
Dept: PHYSICAL THERAPY | Age: 70
End: 2020-06-03
Attending: INTERNAL MEDICINE
Payer: MEDICARE

## 2020-06-08 ENCOUNTER — APPOINTMENT (OUTPATIENT)
Dept: PHYSICAL THERAPY | Age: 70
End: 2020-06-08
Attending: INTERNAL MEDICINE
Payer: MEDICARE

## 2020-06-10 ENCOUNTER — APPOINTMENT (OUTPATIENT)
Dept: PHYSICAL THERAPY | Age: 70
End: 2020-06-10
Attending: INTERNAL MEDICINE
Payer: MEDICARE

## 2020-06-12 ENCOUNTER — APPOINTMENT (OUTPATIENT)
Dept: PHYSICAL THERAPY | Age: 70
End: 2020-06-12
Attending: INTERNAL MEDICINE
Payer: MEDICARE

## 2020-06-15 ENCOUNTER — APPOINTMENT (OUTPATIENT)
Dept: PHYSICAL THERAPY | Age: 70
End: 2020-06-15
Attending: INTERNAL MEDICINE
Payer: MEDICARE

## 2020-06-17 ENCOUNTER — APPOINTMENT (OUTPATIENT)
Dept: PHYSICAL THERAPY | Age: 70
End: 2020-06-17
Attending: INTERNAL MEDICINE
Payer: MEDICARE

## 2020-06-18 ENCOUNTER — TELEPHONE (OUTPATIENT)
Dept: PHYSICAL THERAPY | Age: 70
End: 2020-06-18

## 2020-06-19 ENCOUNTER — TELEPHONE (OUTPATIENT)
Dept: PHYSICAL THERAPY | Age: 70
End: 2020-06-19

## 2020-06-19 NOTE — TELEPHONE ENCOUNTER
Pt called and left a voicemail at 9:59am to cancel all her PT appointments. She will call her doctor to schedule an appointment and will call rehab if she plans to return.

## 2020-06-22 ENCOUNTER — APPOINTMENT (OUTPATIENT)
Dept: PHYSICAL THERAPY | Age: 70
End: 2020-06-22
Attending: INTERNAL MEDICINE
Payer: MEDICARE

## 2020-06-24 ENCOUNTER — APPOINTMENT (OUTPATIENT)
Dept: PHYSICAL THERAPY | Age: 70
End: 2020-06-24
Attending: INTERNAL MEDICINE
Payer: MEDICARE

## 2020-06-29 ENCOUNTER — APPOINTMENT (OUTPATIENT)
Dept: PHYSICAL THERAPY | Age: 70
End: 2020-06-29
Attending: INTERNAL MEDICINE
Payer: MEDICARE

## 2020-10-02 ENCOUNTER — TELEPHONE (OUTPATIENT)
Dept: CARDIOLOGY CLINIC | Facility: CLINIC | Age: 70
End: 2020-10-02

## 2020-10-02 NOTE — TELEPHONE ENCOUNTER
Please see triage info below. Pt having more frequent chest pains. Refusing sooner office visit. Advised ER visit if chest pains return. LOV with Dr. Dante Russo on 6/3/19.  Hx of coronary artery disease, stenting of high-grade LAD lesion, dyslipidemia, pre-di

## 2021-01-18 ENCOUNTER — OFFICE VISIT (OUTPATIENT)
Dept: CARDIOLOGY CLINIC | Facility: CLINIC | Age: 71
End: 2021-01-18
Payer: MEDICARE

## 2021-01-18 VITALS
SYSTOLIC BLOOD PRESSURE: 138 MMHG | WEIGHT: 163 LBS | HEIGHT: 64 IN | DIASTOLIC BLOOD PRESSURE: 82 MMHG | BODY MASS INDEX: 27.83 KG/M2 | RESPIRATION RATE: 18 BRPM | HEART RATE: 64 BPM

## 2021-01-18 DIAGNOSIS — R07.89 OTHER CHEST PAIN: Primary | ICD-10-CM

## 2021-01-18 DIAGNOSIS — E78.5 DYSLIPIDEMIA: ICD-10-CM

## 2021-01-18 DIAGNOSIS — I25.10 CORONARY ARTERY DISEASE INVOLVING NATIVE CORONARY ARTERY OF NATIVE HEART WITHOUT ANGINA PECTORIS: ICD-10-CM

## 2021-01-18 PROCEDURE — 99214 OFFICE O/P EST MOD 30 MIN: CPT | Performed by: INTERNAL MEDICINE

## 2021-01-18 RX ORDER — ATORVASTATIN CALCIUM 40 MG/1
40 TABLET, FILM COATED ORAL
Qty: 90 TABLET | Refills: 3 | Status: SHIPPED | OUTPATIENT
Start: 2021-01-18

## 2021-01-18 NOTE — PROGRESS NOTES
Name:  Ramona Glynn  : 1950    Date of consultation:   2021    Referring physician: Sylvain Marie    Reason for Visit:  Patient presents with:   Follow - Up  CAD  Chest Pain: right & left sided cp daily, last 3 seconds       HPI:   Have not s (Patient not taking: Reported on 2021) 90 tablet 1     Family History:  Family History   Problem Relation Age of Onset   • Cancer Father         pancreatic cancer   • Diabetes Mother    • Cancer Mother         Mother  from [place] [id number] Component Value Date     (H) 10/30/2018    BUN 16 10/30/2018    BUNCREA 16.3 10/30/2018    CREATSERUM 0.98 10/30/2018    ANIONGAP 6 10/30/2018    GFRNAA 59 (L) 10/30/2018    GFRAA >60 10/30/2018    CA 9.8 10/30/2018    OSMOCALC 294 10/30/2018    A

## 2021-02-04 ENCOUNTER — HOSPITAL ENCOUNTER (OUTPATIENT)
Dept: NUCLEAR MEDICINE | Facility: HOSPITAL | Age: 71
Discharge: HOME | End: 2021-02-04
Attending: INTERNAL MEDICINE
Payer: MEDICARE

## 2021-02-04 ENCOUNTER — HOSPITAL ENCOUNTER (OUTPATIENT)
Dept: CV DIAGNOSTICS | Facility: HOSPITAL | Age: 71
Discharge: HOME | End: 2021-02-04
Attending: INTERNAL MEDICINE
Payer: MEDICARE

## 2021-02-04 DIAGNOSIS — I25.10 CORONARY ARTERY DISEASE INVOLVING NATIVE CORONARY ARTERY OF NATIVE HEART WITHOUT ANGINA PECTORIS: ICD-10-CM

## 2021-02-04 DIAGNOSIS — R07.89 OTHER CHEST PAIN: ICD-10-CM

## 2021-02-04 PROCEDURE — 78452 HT MUSCLE IMAGE SPECT MULT: CPT | Performed by: INTERNAL MEDICINE

## 2021-02-04 PROCEDURE — 93017 CV STRESS TEST TRACING ONLY: CPT | Performed by: INTERNAL MEDICINE

## 2021-02-04 PROCEDURE — 93016 CV STRESS TEST SUPVJ ONLY: CPT | Performed by: INTERNAL MEDICINE

## 2021-02-04 PROCEDURE — 93018 CV STRESS TEST I&R ONLY: CPT | Performed by: INTERNAL MEDICINE

## 2021-02-05 ENCOUNTER — TELEPHONE (OUTPATIENT)
Dept: CARDIOLOGY CLINIC | Facility: CLINIC | Age: 71
End: 2021-02-05

## 2021-02-05 NOTE — TELEPHONE ENCOUNTER
Detailed message left (HIPAA verified)  Stress test results left on voice mail. Follow up with  in 1 year unless symptomatic, continue cholesterol medications.       DOLORES Hernandez [facility] Clinical Staff             Fixed defect with a

## 2021-03-05 DIAGNOSIS — Z23 NEED FOR VACCINATION: ICD-10-CM

## 2021-03-09 NOTE — PROGRESS NOTES
SUBJECTIVE:  Ms. Quiroz is seen today at the request of Briana Victoria MD and Jasmin Samuel MD.     Patient complained of left otalgia and aural fullness.  She reported bilateral tinnitus and noted she also experiences clicking in the left ear since the end of October, 2020.  She has also recently been experiencing sinus drainage.  Patient stated her hearing is okay.  Positive history of otitis media.  Patient denied dizziness, family history of hearing loss, history of ear surgeries, history of noise exposure, and history of ear trauma.    OBJECTIVE:  AUDIOMETRIC RESULTS  Otoscopic Evaluation:  Examination reveals clear ear canals bilaterally    Audiogram:  Right ear:  Normal hearing sensitivity from 250-8000 Hz  Left ear:  Normal hearing sensitivity from 250-8000 Hz    Speech Audiometry:  Speech Reception Thresholds:  15 dB HL right ear and 15 dB HL left ear.  Word Recognition Test Scores:  100% right ear when presented at 60 dB HL in quiet and 100% left ear when presented at 60 dB HL in quiet.    Acoustic Immittance:  Right ear:  Middle ear pressure and eardrum mobility within defined limits    Left ear:  Middle ear pressure and eardrum mobility within defined limits    IMPRESSIONS:  Pure tone audiometry indicated normal hearing sensitivity in both ears with a diagnosis of left otalgia, left ear fullness, and bilateral tinnitus.    RECOMMENDATIONS:  These results were reviewed with the patient and will be forwarded to Briana Victoria MD and Jasmin Samuel MD.     Follow-up with Dr. Samuel today as planned.  Re-evaluation of hearing as needed.    The patient indicated understanding of the diagnosis and was encouraged to contact our department with any questions or concerns.       Waqas Troy is a 77year old female. No chief complaint on file. HPI:   Patient had a stent in 2017 September. She feels well without chest discomfort or dyspnea is generally happy with things. Lipids are good biggest problem is her back.   Bishop Monterroso Comment: socially        REVIEW OF SYSTEMS:   Review of Systems   Constitutional: Negative. Respiratory: Negative. Negative for chest tightness and shortness of breath. Cardiovascular: Negative.   Negative for palpitations and leg swelling

## 2021-11-04 NOTE — TELEPHONE ENCOUNTER
From: Jia Sena  To: Richa Poe MD  Sent: 6/4/2019 12:53 PM CDT  Subject: Other    Hello Dr. Jenna Hastings,    The thyroid imaging is 6/11. At this time, the only way the imaging can be performed is by numbing with a needle.  Due to past negative exp
Please see patient email and advise.
04-Oct-2021

## 2022-02-22 ENCOUNTER — LAB ENCOUNTER (OUTPATIENT)
Dept: LAB | Facility: HOSPITAL | Age: 72
End: 2022-02-22
Attending: INTERNAL MEDICINE
Payer: MEDICARE

## 2022-02-22 DIAGNOSIS — E78.5 HYPERLIPEMIA: ICD-10-CM

## 2022-02-22 DIAGNOSIS — I25.110 ATHEROSCLEROTIC HEART DISEASE OF NATIVE CORONARY ARTERY WITH UNSTABLE ANGINA PECTORIS (HCC): Primary | ICD-10-CM

## 2022-02-22 LAB
ALT SERPL-CCNC: 28 U/L
AST SERPL-CCNC: 17 U/L (ref 15–37)
CHOLEST SERPL-MCNC: 127 MG/DL (ref ?–200)
FASTING PATIENT LIPID ANSWER: YES
HDLC SERPL-MCNC: 44 MG/DL (ref 40–59)
LDLC SERPL CALC-MCNC: 65 MG/DL (ref ?–100)
NONHDLC SERPL-MCNC: 83 MG/DL (ref ?–130)
TRIGL SERPL-MCNC: 95 MG/DL (ref 30–149)
VLDLC SERPL CALC-MCNC: 14 MG/DL (ref 0–30)

## 2022-02-22 PROCEDURE — 84450 TRANSFERASE (AST) (SGOT): CPT

## 2022-02-22 PROCEDURE — 84460 ALANINE AMINO (ALT) (SGPT): CPT

## 2022-02-22 PROCEDURE — 36415 COLL VENOUS BLD VENIPUNCTURE: CPT

## 2022-02-22 PROCEDURE — 80061 LIPID PANEL: CPT

## 2023-11-01 ENCOUNTER — TELEPHONE (OUTPATIENT)
Dept: GASTROENTEROLOGY | Facility: CLINIC | Age: 73
End: 2023-11-01

## 2023-11-01 NOTE — TELEPHONE ENCOUNTER
----- Message from Valente Zuniga RN sent at 2019 10:13 AM CST -----  Regardin yr CLN recall  Entered into Epic:Recall colon in 5 years per Dr. Jennifer Bustos. Last Colon done 19, next due 24. Snapshot updated. Letter mailed.

## 2024-05-15 ENCOUNTER — ORDER TRANSCRIPTION (OUTPATIENT)
Dept: PHYSICAL THERAPY | Facility: HOSPITAL | Age: 74
End: 2024-05-15

## 2024-05-15 DIAGNOSIS — M54.50 CHRONIC RIGHT-SIDED LOW BACK PAIN WITHOUT SCIATICA: Primary | ICD-10-CM

## 2024-05-15 DIAGNOSIS — R26.2 DIFFICULTY WALKING: ICD-10-CM

## 2024-05-15 DIAGNOSIS — G89.29 CHRONIC BILATERAL LOW BACK PAIN WITHOUT SCIATICA: ICD-10-CM

## 2024-05-15 DIAGNOSIS — G89.29 CHRONIC RIGHT-SIDED LOW BACK PAIN WITHOUT SCIATICA: Primary | ICD-10-CM

## 2024-05-15 DIAGNOSIS — M62.830 MUSCLE SPASM OF BACK: ICD-10-CM

## 2024-05-15 DIAGNOSIS — M54.50 CHRONIC BILATERAL LOW BACK PAIN WITHOUT SCIATICA: ICD-10-CM

## 2024-06-10 ENCOUNTER — TELEPHONE (OUTPATIENT)
Dept: PHYSICAL THERAPY | Facility: HOSPITAL | Age: 74
End: 2024-06-10

## 2024-06-11 ENCOUNTER — OFFICE VISIT (OUTPATIENT)
Dept: PHYSICAL THERAPY | Age: 74
End: 2024-06-11
Attending: INTERNAL MEDICINE
Payer: MEDICARE

## 2024-06-11 DIAGNOSIS — G89.29 CHRONIC BILATERAL LOW BACK PAIN WITHOUT SCIATICA: ICD-10-CM

## 2024-06-11 DIAGNOSIS — M54.50 CHRONIC RIGHT-SIDED LOW BACK PAIN WITHOUT SCIATICA: Primary | ICD-10-CM

## 2024-06-11 DIAGNOSIS — M62.830 MUSCLE SPASM OF BACK: ICD-10-CM

## 2024-06-11 DIAGNOSIS — G89.29 CHRONIC RIGHT-SIDED LOW BACK PAIN WITHOUT SCIATICA: Primary | ICD-10-CM

## 2024-06-11 DIAGNOSIS — M54.50 CHRONIC BILATERAL LOW BACK PAIN WITHOUT SCIATICA: ICD-10-CM

## 2024-06-11 DIAGNOSIS — R26.2 DIFFICULTY WALKING: ICD-10-CM

## 2024-06-11 PROCEDURE — 97162 PT EVAL MOD COMPLEX 30 MIN: CPT | Performed by: PHYSICAL THERAPIST

## 2024-06-11 PROCEDURE — 97110 THERAPEUTIC EXERCISES: CPT | Performed by: PHYSICAL THERAPIST

## 2024-06-11 NOTE — PROGRESS NOTES
SPINE EVALUATION:     Diagnosis:    Chronic right-sided low back pain without sciatica (M54.50,G89.29)  Muscle spasm of back (M62.830)  Difficulty walking (R26.2)  Chronic bilateral low back pain without sciatica (M54.50,G89.29) Referring Provider: Annamaria Sánchez  Date of Evaluation:    6/11/2024    Precautions:  Fall Risk Next MD visit:   none scheduled  Date of Surgery: n/a     PATIENT SUMMARY   Melisa Agosto is a 74 year old female who presents to therapy today with complaints of chronic LBP, difficulty walking. C/o started with resistance training at gym may years ago. Pt reports chronic LBP leading up to 2 spine surgeries: laminectomy ~ 1990's, progressive L sided LBP leading up to lumbar fusion 7/9/2019.  She reports R sided issues returned following fusion. Previous interventions include several round of PT (progress noted). Patient currently c/o R shifting to L LBP radiating from bra region to buttocks, B LE pain radiating down to all toes, difficulty walking, intermittent SPC use.  She consulted Dr. Sánchez re: chronic LBP/LE c/o: referred to PT, pt denies any imaging, taking Tramadol. She c/o difficulty walking, leaning forward, driving longer periods of time related to getting into/out of car, stairs, intermittent cane use, difficulty with transfers (bed, ground), concerned re: balance. Denies falls.     Patient lives c daughter who offers assistance; stairs into basement. Patient is retired.     Current functional limitations include: difficulty walking, leaning forward, driving longer periods of time related to getting into/out of car, stairs, intermittent cane use, difficulty with transfers (bed, ground).       Melisa describes prior level of function: wheel chair outside of home 2-7/2019, chronic LBP/LE pain. Pt goals include: walk more than 1 block, walk dog    Past medical history was reviewed with Melisa. Significant findings include: history of lumbar laminectomy 1990's, R shoulder  rotator cuff repair, stent, bunionectomy, heel surgery related to work injury.    S/P colonoscopic polypectomy 1/4/2019   Diverticulosis large intestine w/o perforation or abscess w/o bleeding 1/4/2019   Internal hemorrhoids without complication 1/4/2019   Breast mass 10/24/2018   Hyperthyroidism 7/3/2018   Abnormal mammogram 1/17/2018   Osteopenia 1/17/2018   L3-4 mod-severe, L2-3 mod-severe, L1-2 mod central stenosis 11/6/2017   Sciatica of left side 10/17/2017   Coronary artery disease involving native coronary artery of native heart without angina pectoris 9/18/2017   S/P drug eluting coronary stent placement 9/18/2017   Prediabetes 9/12/2017   Other chest pain 9/12/2017   Lumbar post-laminectomy syndrome: L2-S1 laminectomies 8/3/2017   L5-S1 left mod/right severe, L4-5 left mild-mod/right severe, L3-4 left severe, L2-3 left mod-severe foraminal stenosis 8/3/2017   L2-3 left mild HNP, L3-4 left mild HNP, L4-5 right mild HNP 8/3/2017   L3-4 grade 1-2 stable spondylolisthesis 8/3/2017   History of hysterectomy for benign disease 9/3/2015   Dyslipidemia 5/28/2015       Pt describes pain level current 9/10, at best 6/10, at worst 10/10.   Pt denies diplopia, dysarthria, dysphasia, dizziness, drop attacks, bowel/bladder changes, saddle anesthesia, and JUDE LE N/T.    ASSESSMENT  Melisa presents to physical therapy evaluation with primary c/o chronic LBP, LE c/o. The results of the objective tests and measures show : forward rounded posture, pain/restriction lumbar spine AROM, LE weakness with MMT, increased fall risk.  Functional deficits include but are not limited to: difficulty walking, leaning forward, driving longer periods of time related to getting into/out of car, stairs, intermittent cane use, difficulty with transfers (bed, ground). Signs and symptoms are consistent with diagnoses above. Pt and PT discussed evaluation findings, pathology, POC and HEP.  Pt voiced understanding and performs HEP correctly  without reported pain. Skilled Physical Therapy is medically necessary to address the above impairments and reach functional goals.     OBJECTIVE:   Observation/Posture: forward rounded thoracic spine, protruded cervical spine. Surgical scar lumbar region  Neuro Screen: sensation imparied to light touch gross R LE, L impaired to light touch L L5 dermatome, L LE dermatomes otherwise intact to light touch    Lumbar SPine AROM: (* denotes performed with pain)  Flexion: WFL  Extension: neutral* pt concerned about balance  Sidebending: R/L restriction* pt c/o  Rotation: R 40 deg; L 50 deg*  pt concerned about balance    Restriction L/R hip PROM ER, abd    Strength: (* denotes performed with pain)  LE   Hip flexion (L2): R 3/5; L 3/5  Hip abduction: R/L mobility limits assessment  Hip Extension: R/L mobility limits assessment  Hip ER: R 4/5; L 4/5  Hip IR: R -/5; L -/5  Knee Flexion: R 4/5; L 4/5   Knee extension (L3): R 4/5; L 4/5   DF (L4): R 4/5 (reduced AROM); L 4/5     Flexibility:  time limited ability to assess  LE   Hip Flexor: R-, L -  Hamstrings: R -; L -  Piriformis: R -; L -  Quads: R -; L -  Gastroc-soleus: R -; L -     Gait: pt ambulates on level ground with no assistive device, fair dynamic balance, slow keyla.  TUG: UE A sit -stand, no assistive device (pt reports left cane in car), 38.22 sec    Today’s Treatment and Response:   Pt education was provided on exam findings, treatment diagnosis, treatment plan, expectations, and prognosis. Pt was also provided recommendations for  Bodymechanics, cane use for safety  during ambulation  Patient was instructed in and issued a HEP for: supine LTR, seated thoracic spine ext, mini squat, standing L/R hip abd with UE support    Charges: PT Eval Moderate Complexity, 1 TE (15 min)      Total Timed Treatment: 15 min     Total Treatment Time: 45 min     Based on clinical rationale and outcome measures, this evaluation involved Moderate Complexity decision making due  to 1-2 personal factors/comorbidities, 3 body structures involved/activity limitations, and evolving symptoms including changing pain levels.  PLAN OF CARE:    Goals: (to be met in 8-10 visits)   Patient to report independence with PT HEP to facilitate self management and to maintain progress made in PT.   Patient to have at least 70 deg active L/R rotation to facilitate transfers.   Patient to have L/R hip flexion MMT each 4/5 to facilitate tranfers.   Patient to to have TUG score less than 20 seconds using appropriate assistive device in order to reduce fall risk.   Patient to use appropriate assistive device during ambulation in order to reduce fall risk.     Frequency / Duration: Patient will be seen for 2 x/week or a total of 8-10 visits over a 90 day period. Treatment will include: Gait training, Mechanical Traction, Neuromuscular Re-education, Self-Care Home Management, Therapeutic Activities, Therapeutic Exercise, Home Exercise Program instruction, and Modalities to include: MHP, cold pack    Education or treatment limitation: None  Rehab Potential:fair    Oswestry Disability Index Score  Score: 74 % (6/11/2024 11:53 AM)      Patient/Family/Caregiver was advised of these findings, precautions, and treatment options and has agreed to actively participate in planning and for this course of care.    Thank you for your referral. Please co-sign or sign and return this letter via fax as soon as possible to [phone]. If you have any questions, please contact me at Dept: [phone]    Sincerely,  Electronically signed by therapist: Kalina Beckham, PT    Physician's certification required: Yes  I certify the need for these services furnished under this plan of treatment and while under my care.    X___________________________________________________ Date____________________    Certification From: 6/11/2024  To:9/9/2024

## 2024-06-14 ENCOUNTER — OFFICE VISIT (OUTPATIENT)
Dept: PHYSICAL THERAPY | Age: 74
End: 2024-06-14
Attending: INTERNAL MEDICINE
Payer: MEDICARE

## 2024-06-14 PROCEDURE — 97110 THERAPEUTIC EXERCISES: CPT | Performed by: PHYSICAL THERAPIST

## 2024-06-14 PROCEDURE — 97530 THERAPEUTIC ACTIVITIES: CPT | Performed by: PHYSICAL THERAPIST

## 2024-06-14 NOTE — PROGRESS NOTES
Diagnosis:   Chronic right-sided low back pain without sciatica (M54.50,G89.29)  Muscle spasm of back (M62.830)  Difficulty walking (R26.2)  Chronic bilateral low back pain without sciatica (M54.50,G89.29   Referring Provider: Annamaria Sánchez  Date of Evaluation:    6/11/2024    Precautions:  Fall Risk Next MD visit:   none scheduled  Date of Surgery: laminectomy 1990's, lumbar fusion 7/9/2019   Insurance Primary/Secondary: MEDICARE / [employer]     # Auth Visits:  POC every 10 visits         Subjective:  C/o R sided LBP. Concerned  re: balance when walk and when stepping backward. Feel like I am kicking my cane.     Pain: 10/10      Objective:   Gait: R quad cane use (but for L hand), slow keyla, fair toe clearance with quad cane      Assessment: Educated pt on cane options (see below) and advised pt to purchase new appropriate cane due to fall risk using  current quad cane.  Pt tolerated session fairly well. She continues to suffer from chronic LBP.       Goals:   (to be met in 8-10 visits)   Patient to report independence with PT HEP to facilitate self management and to maintain progress made in PT.   Patient to have at least 70 deg active L/R rotation to facilitate transfers.   Patient to have L/R hip flexion MMT each 4/5 to facilitate tranfers.   Patient to to have TUG score less than 20 seconds using appropriate assistive device in order to reduce fall risk.   Patient to use appropriate assistive device during ambulation in order to reduce fall risk.     Plan: Continue PT 2 x weekly for 8 -10 visits week. Consider gluteal STM next session, balance (including for stepping backward).   Date: 6/14/2024  TX#: 2/8-10 Date:                 TX#: 3/ Date:                 TX#: 4/ Date:                 TX#: 5/ Date:   Tx#: 6/   There Ex:   HEP review; see below.  Supine BKFO 4 x 10 sec each  Supine DKC with LE supported on red ball 5 x 5 sec each   Bridge on red ball x 10 reps  Supine across body piriformis  stretch stretch x 30 sec each L/R          TA:   Quad cane adjusted to proper height, advised that is is for L hand not R, which may increase tripping/fall risk.  Gait training using R quad cane, R SPC  Advised/reviewed pt may consider SPC with/without wider base, collapsible       Manual:   PROM L/R hip flexion, abduction, ER  STM L/R lumbar paraspinals/gluteals              HEP: supine LTR, seated thoracic spine ext, mini squat, standing L/R hip abd with UE support    Charges: 2 TE (25 min), 1 TA (10 min), 0 manual (5 min)   Total Timed Treatment: 40 min  Total Treatment Time: 45 min

## 2024-06-18 ENCOUNTER — TELEPHONE (OUTPATIENT)
Dept: PHYSICAL THERAPY | Facility: HOSPITAL | Age: 74
End: 2024-06-18

## 2024-06-19 ENCOUNTER — APPOINTMENT (OUTPATIENT)
Dept: PHYSICAL THERAPY | Age: 74
End: 2024-06-19
Attending: INTERNAL MEDICINE
Payer: MEDICARE

## 2024-06-21 ENCOUNTER — OFFICE VISIT (OUTPATIENT)
Dept: PHYSICAL THERAPY | Age: 74
End: 2024-06-21
Attending: INTERNAL MEDICINE
Payer: MEDICARE

## 2024-06-21 PROCEDURE — 97110 THERAPEUTIC EXERCISES: CPT | Performed by: PHYSICAL THERAPIST

## 2024-06-21 NOTE — PROGRESS NOTES
Diagnosis:   Chronic right-sided low back pain without sciatica (M54.50,G89.29)  Muscle spasm of back (M62.830)  Difficulty walking (R26.2)  Chronic bilateral low back pain without sciatica (M54.50,G89.29   Referring Provider: Annamaria Sánchez  Date of Evaluation:    6/11/2024    Precautions:  Fall Risk Next MD visit:   none scheduled  Date of Surgery: laminectomy 1990's, lumbar fusion 7/9/2019   Insurance Primary/Secondary: MEDICARE / [employer]     # Auth Visits:  POC every 10 visits         Subjective:  Working on buying a cane. Can we review cane options? C/o difficulty reaching/tying shoes. Only c/o 1 episode of feeling like going to lose balance when stepping backward but did not fall.     Pain: 8/10      Objective:   Gait: no assistive device, fair balance      Assessment:   Reviewed consideration of wider base SPC. Practiced SKC/DKC in effort to improve spine flexion AROM to facilitate don/doff shoes.  Pt with continued c/o chronic LBP, LE c/o.  Prognosis with PT fair given history and chronicity.     Goals:   (to be met in 8-10 visits)   Patient to report independence with PT HEP to facilitate self management and to maintain progress made in PT.   Patient to have at least 70 deg active L/R rotation to facilitate transfers.   Patient to have L/R hip flexion MMT each 4/5 to facilitate tranfers.   Patient to to have TUG score less than 20 seconds using appropriate assistive device in order to reduce fall risk.   Patient to use appropriate assistive device during ambulation in order to reduce fall risk.     Plan: Continue PT 2 x weekly for 8 -10 visits week. Consider gluteal STM next session, balance (including for stepping backward), hip ROM, strengthening.   Date: 6/14/2024  TX#: 2/8-10 Date:   6/21/2024            TX#: 3/8-10 - Pt arrived ~ 10 min late Date:                 TX#: 4/ Date:                 TX#: 5/ Date:   Tx#: 6/   There Ex:   HEP review; see below.  Supine BKFO 4 x 10 sec each  Supine  DKC with LE supported on red ball 5 x 5 sec each   Bridge on red ball x 10 reps  Supine across body piriformis stretch stretch x 30 sec each L/R    There Ex:   Stationary recumbent bike: level 1 x 3 min  Supine SKC 5 x 5 sec each (consider for HEP to assist with reaching toes/feet)  Supine DKC with LE supported on red ball 5 x 5 sec each   Bridge on red ball x 10 reps  Supine LTR 5 x 5 sec each  Sit - stand from high plinth x 10 reps  High lateral steps at bar  x  ~ 10 feet - pt c/o hip discomfort, stopped  Standing L/R marching 10 x 2 sec   Lateral steps at bar 2 x ~ 10 feet      TA:   Quad cane adjusted to proper height, advised that is is for L hand not R, which may increase tripping/fall risk.  Gait training using R quad cane, R SPC  Advised/reviewed pt may consider SPC with/without wider base, collapsible TA:   Reviewed SPC with wider base, including examples      Manual:   PROM L/R hip flexion, abduction, ER  STM L/R lumbar paraspinals/gluteals Manual:   PROM L/R hip flexion, abduction, ER             HEP: supine LTR, seated thoracic spine ext, mini squat, standing L/R hip abd with UE support    Charges: 3 TE (30 min), 0 TA (5 min), 0 manual (5 min)   Total Timed Treatment: 40 min  Total Treatment Time: 40 min

## 2024-06-26 ENCOUNTER — OFFICE VISIT (OUTPATIENT)
Dept: PHYSICAL THERAPY | Age: 74
End: 2024-06-26
Attending: INTERNAL MEDICINE
Payer: MEDICARE

## 2024-06-26 PROCEDURE — 97140 MANUAL THERAPY 1/> REGIONS: CPT | Performed by: PHYSICAL THERAPIST

## 2024-06-26 PROCEDURE — 97110 THERAPEUTIC EXERCISES: CPT | Performed by: PHYSICAL THERAPIST

## 2024-06-26 PROCEDURE — 97530 THERAPEUTIC ACTIVITIES: CPT | Performed by: PHYSICAL THERAPIST

## 2024-06-26 NOTE — PROGRESS NOTES
Diagnosis:   Chronic right-sided low back pain without sciatica (M54.50,G89.29)  Muscle spasm of back (M62.830)  Difficulty walking (R26.2)  Chronic bilateral low back pain without sciatica (M54.50,G89.29   Referring Provider: Annamaria Sánchez  Date of Evaluation:    6/11/2024    Precautions:  Fall Risk Next MD visit:   none scheduled  Date of Surgery: laminectomy 1990's, lumbar fusion 7/9/2019   Insurance Primary/Secondary: MEDICARE / [employer]     # Auth Visits:  POC every 10 visits         Subjective:  C/o increased LBP and R LE pain into thigh.  Pt reports attended a seminar this weekend,  R LE locked up three times had to take a pain pill three times. Pt reports R LE locked up again today, took pain pill (Tramadol) which helps so that I can walk. She reports c/o start R low back and radiates down R LE. Pt reports has has similar episodes before but that these have not occurred for awhile. Pt reports that this was the reason that she had lumbar surgery in the past, but it was for her L LE.  Purchased an new can - a Smarp.cane. Still have some difficulty reaching/tying shoes. No longer feel like going to lose my balance when I stand up. Completing HEP.   Pain: 9/10    Objective:   Gait: no assistive device, fair balance; SPC use as needed, including to enter/exit PT clinic  + c/o/increased tone R lumbar and gluteal musculature    Assessment:   Advised pt may consider f/u with spine surgeon if episodes or R LE locking up persist.  Pt with increased tone R lumbar and gluteal musculature suggestive of strain/muscular involvement. Modified session accordingly. Advised pt to focus on stretches, STM, MHP until c/o resolved; then may resume strengthening as well tolerated.  Adjusted Hurrycane to appropriate height and gait trained; pt pat well.     Goals:   (to be met in 8-10 visits)   Patient to report independence with PT HEP to facilitate self management and to maintain progress made in PT.   Patient to have at  least 70 deg active L/R rotation to facilitate transfers.   Patient to have L/R hip flexion MMT each 4/5 to facilitate tranfers.   Patient to to have TUG score less than 20 seconds using appropriate assistive device in order to reduce fall risk.   Patient to use appropriate assistive device during ambulation in order to reduce fall risk.     Plan: Continue PT 2 x weekly for 8 -10 visits week. F/u on R LE c/o. Consider gluteal STM, balance (including for stepping backward), hip ROM, strengthening.   Date: 6/14/2024  TX#: 2/8-10 Date:   6/21/2024            TX#: 3/8-10 - Pt arrived ~ 10 min late Date:    6/26/2024             TX#: 4/8-10 - Pt arrived ~ 10 min late. Date:                 TX#: 5/ Date:   Tx#: 6/ There Ex:   HEP review; see below.  Supine BKFO 4 x 10 sec each  Supine DKC with LE supported on red ball 5 x 5 sec each   Bridge on red ball x 10 reps  Supine across body piriformis stretch stretch x 30 sec each L/R    There Ex:   Stationary recumbent bike: level 1 x 3 min  Supine SKC 5 x 5 sec each (consider for HEP to assist with reaching toes/feet)  Supine DKC with LE supported on red ball 5 x 5 sec each   Bridge on red ball x 10 reps  Supine LTR 5 x 5 sec each  Sit - stand from high plinth x 10 reps  High lateral steps at bar  x  ~ 10 feet - pt c/o hip discomfort, stopped  Standing L/R marching 10 x 2 sec   Lateral steps at bar 2 x ~ 10 feet There Ex:    Stationary recumbent bike: level 1 x 4 min  Supine DKC with LE supported on red ball 5 x 5 sec each   Bridge on red ball  - pt c/o R LE pain, stopped.   Supine L/R across body piriformis stretch x 20 sec (HEP); may also consider SKC for HEP for current c/o.      TA:   Quad cane adjusted to proper height, advised that is is for L hand not R, which may increase tripping/fall risk.  Gait training using R quad cane, R SPC  Advised/reviewed pt may consider SPC with/without wider base, collapsible TA:   Reviewed SPC with wider base, including examples  TA:   Sitting posture: feet on ground, back support  Standing posture: L = R LE WB  SPC adjusted to appropriate height, gait trained  For current c/o, HEP: skc, piriformis stretch, STM, MHP       Manual:   PROM L/R hip flexion, abduction, ER  STM L/R lumbar paraspinals/gluteals Manual:   PROM L/R hip flexion, abduction, ER Manual:   STM R gluteals and lumbar musculature  PROM L/R hip flexion, abduction, ER       MHP lumbar/gluteal region x 10 min seated     HEP: supine LTR, seated thoracic spine ext, mini squat, standing L/R hip abd with UE support, SKC, piriformis stretch    Charges: 1 TE (20 min), 1 TA (10 min), 1 manual (10 min)   Total Timed Treatment: 40 min  Total Treatment Time: 40 min

## 2024-07-11 ENCOUNTER — APPOINTMENT (OUTPATIENT)
Dept: PHYSICAL THERAPY | Age: 74
End: 2024-07-11
Attending: INTERNAL MEDICINE
Payer: MEDICARE

## 2024-07-16 ENCOUNTER — OFFICE VISIT (OUTPATIENT)
Dept: PHYSICAL THERAPY | Age: 74
End: 2024-07-16
Attending: INTERNAL MEDICINE
Payer: MEDICARE

## 2024-07-16 PROCEDURE — 97110 THERAPEUTIC EXERCISES: CPT | Performed by: PHYSICAL THERAPIST

## 2024-07-16 PROCEDURE — 97140 MANUAL THERAPY 1/> REGIONS: CPT | Performed by: PHYSICAL THERAPIST

## 2024-07-16 PROCEDURE — 97112 NEUROMUSCULAR REEDUCATION: CPT | Performed by: PHYSICAL THERAPIST

## 2024-07-16 NOTE — PROGRESS NOTES
Diagnosis:   Chronic right-sided low back pain without sciatica (M54.50,G89.29)  Muscle spasm of back (M62.830)  Difficulty walking (R26.2)  Chronic bilateral low back pain without sciatica (M54.50,G89.29   Referring Provider: Annamaria Sánchez  Date of Evaluation:    6/11/2024    Precautions:  Fall Risk Next MD visit:   none scheduled  Date of Surgery: laminectomy 1990's, lumbar fusion 7/9/2019   Insurance Primary/Secondary: MEDICARE / [employer]     # Auth Visits:  POC every 10 visits         Subjective:  Was able to walk my dog ~ 1 block which is progress.  R LE has gotten better, has only locked up once since.  Continued c/o chronic LB and LE c/o.  Using Hurrycane but don't feel like I always need it.  C/o onset of pain when toes/bottom of foot are touched.   Pain:  8/10    Objective:   Gait: no assistive device, fair balance; SPC use as needed, including to enter/exit PT clinic  + c/o/increased tone L lumbar and gluteal musculature    Assessment:   Pt reports onset of c/o foot pain with palpation. Advised that PT may briefly evaluate and/or that pt may consult physician for c/o. Pt plans to have physician examine.  Pt returns to PT after last attending 6/26/2024, related to appointment availability, per pt. Pt reporting only 1 episode of R LE locking up, improvement. Based on NM RE-ed, use of hand for balance during obstacle negotiation, advised SPC use outdoors, may consider SPC or no assistive device indoors in predictable areas without obstacles, as safe/well tolerated.  Pt with continued c/o chronic back/LE pain.     Goals:   (to be met in 8-10 visits)   Patient to report independence with PT HEP to facilitate self management and to maintain progress made in PT.   Patient to have at least 70 deg active L/R rotation to facilitate transfers.   Patient to have L/R hip flexion MMT each 4/5 to facilitate tranfers.   Patient to to have TUG score less than 20 seconds using appropriate assistive device in  order to reduce fall risk.   Patient to use appropriate assistive device during ambulation in order to reduce fall risk.     Plan: Continue PT 2 x weekly for 8 -10 visits week. F/u on R LE c/o.  Progress gait, balance, hip ROM, strengthening.   Date: 6/14/2024  TX#: 2/8-10 Date:   6/21/2024            TX#: 3/8-10 - Pt arrived ~ 10 min late Date:    6/26/2024             TX#: 4/8-10 - Pt arrived ~ 10 min late. Date:  7/16/2024                TX#: 5/8-10 Date:   Tx#: 6/ There Ex:   HEP review; see below.  Supine BKFO 4 x 10 sec each  Supine DKC with LE supported on red ball 5 x 5 sec each   Bridge on red ball x 10 reps  Supine across body piriformis stretch stretch x 30 sec each L/R    There Ex:   Stationary recumbent bike: level 1 x 3 min  Supine SKC 5 x 5 sec each (consider for HEP to assist with reaching toes/feet)  Supine DKC with LE supported on red ball 5 x 5 sec each   Bridge on red ball x 10 reps  Supine LTR 5 x 5 sec each  Sit - stand from high plinth x 10 reps  High lateral steps at bar  x  ~ 10 feet - pt c/o hip discomfort, stopped  Standing L/R marching 10 x 2 sec   Lateral steps at bar 2 x ~ 10 feet There Ex:    Stationary recumbent bike: level 1 x 4 min  Supine DKC with LE supported on red ball 5 x 5 sec each   Bridge on red ball  - pt c/o R LE pain, stopped.   Supine L/R across body piriformis stretch x 20 sec (HEP); may also consider SKC for HEP for current c/o.  There Ex :  Toe tap to 6 inch step x 5 reps, to ~ 12 inch step x 5 reps each L/R LE  Standing alternate B heel/toe raises x 15 reps  Standing alternate L/R hip abd x 10 reps  Standing alternate hip ext x 10 reps each   B gastroc stretch on slant board 2 x 20 sec each    TA:   Quad cane adjusted to proper height, advised that is is for L hand not R, which may increase tripping/fall risk.  Gait training using R quad cane, R SPC  Advised/reviewed pt may consider SPC with/without wider base, collapsible TA:   Reviewed SPC with wider base,  including examples TA:   Sitting posture: feet on ground, back support  Standing posture: L = R LE WB  SPC adjusted to appropriate height, gait trained  For current c/o, HEP: skc, piriformis stretch, STM, MHP       Manual:   PROM L/R hip flexion, abduction, ER  STM L/R lumbar paraspinals/gluteals Manual:   PROM L/R hip flexion, abduction, ER Manual:   STM R gluteals and lumbar musculature  PROM L/R hip flexion, abduction, ER Manual:   STM L gluteals and lumbar musculature  PROM L/R hip flexion, abduction, ER      MHP lumbar/gluteal region x 10 min seated NM RE (Gait belt donned, PT SBA/contact guard):   [employer]: forward and lateral steps onto over with UE A x 5 reps  Gait with/without SPC: head nods yes/no, pt cuing fast/slow and stop/go     HEP: supine LTR, seated thoracic spine ext, mini squat, standing L/R hip abd with UE support, SKC, piriformis stretch    Charges: 1 TE (20 min), 1 manual (10 min), 1 NM RE (10 min) Total Timed Treatment: 40 min  Total Treatment Time: 45 min

## 2024-07-19 ENCOUNTER — OFFICE VISIT (OUTPATIENT)
Dept: PHYSICAL THERAPY | Age: 74
End: 2024-07-19
Attending: INTERNAL MEDICINE
Payer: MEDICARE

## 2024-07-19 PROCEDURE — 97110 THERAPEUTIC EXERCISES: CPT | Performed by: PHYSICAL THERAPIST

## 2024-07-19 PROCEDURE — 97140 MANUAL THERAPY 1/> REGIONS: CPT | Performed by: PHYSICAL THERAPIST

## 2024-07-19 PROCEDURE — 97112 NEUROMUSCULAR REEDUCATION: CPT | Performed by: PHYSICAL THERAPIST

## 2024-07-19 NOTE — PROGRESS NOTES
Diagnosis:   Chronic right-sided low back pain without sciatica (M54.50,G89.29)  Muscle spasm of back (M62.830)  Difficulty walking (R26.2)  Chronic bilateral low back pain without sciatica (M54.50,G89.29   Referring Provider: Annamaria Sánchez  Date of Evaluation:    6/11/2024    Precautions:  Fall Risk Next MD visit:   none scheduled  Date of Surgery: laminectomy 1990's, lumbar fusion 7/9/2019   Insurance Primary/Secondary: MEDICARE / [employer]     # Auth Visits:  POC every 10 visits         Subjective:  C/o R sided LB and gluteal c/o. Using cane.  Balance is better.  Foot is not bothering me today. Deny any more episodes of R LE locking up. Would like to complete balance exercises today.     Pain:  8/10    Objective:   Gait: no assistive device short distances within PT clinic, fair balance; SPC use as needed, including to enter/exit PT clinic  + c/o/increased tone R lumbar and gluteal musculature  Pt c/o LBP preventing s/l to supine transfer    Assessment:   Pt with continued c/o chronic LBP/gluteal c/o. Pt not a candidate for IFC due to hx of lumbar fusion.  Pt may benefit from physiatry consultation for chronic back pain; pt reports no longer under the care of her surgeon. Pt tolerated session well, reported felt better at close of session. Supine interventions not completed due to pt c/o LBP preventing s/l - supine transfer. Pt tolerated SPC adjustment well, reported felt beter on shoulder.     Goals:   (to be met in 8-10 visits)   Patient to report independence with PT HEP to facilitate self management and to maintain progress made in PT.   Patient to have at least 70 deg active L/R rotation to facilitate transfers.   Patient to have L/R hip flexion MMT each 4/5 to facilitate tranfers.   Patient to to have TUG score less than 20 seconds using appropriate assistive device in order to reduce fall risk.   Patient to use appropriate assistive device during ambulation in order to reduce fall risk.     Plan:  Continue PT 2 x weekly for 8 -10 visits week. Progress gait, balance, hip/spine ROM, strengthening.   Date: 6/14/2024  TX#: 2/8-10 Date:   6/21/2024            TX#: 3/8-10 - Pt arrived ~ 10 min late Date:    6/26/2024             TX#: 4/8-10 - Pt arrived ~ 10 min late. Date:  7/16/2024                TX#: 5/8-10 Date: 7/19/2014  Tx#: 6/8-10          There Ex:   HEP review; see below.  Supine BKFO 4 x 10 sec each  Supine DKC with LE supported on red ball 5 x 5 sec each   Bridge on red ball x 10 reps  Supine across body piriformis stretch stretch x 30 sec each L/R    There Ex:   Stationary recumbent bike: level 1 x 3 min  Supine SKC 5 x 5 sec each (consider for HEP to assist with reaching toes/feet)  Supine DKC with LE supported on red ball 5 x 5 sec each   Bridge on red ball x 10 reps  Supine LTR 5 x 5 sec each  Sit - stand from high plinth x 10 reps  High lateral steps at bar  x  ~ 10 feet - pt c/o hip discomfort, stopped  Standing L/R marching 10 x 2 sec   Lateral steps at bar 2 x ~ 10 feet There Ex:    Stationary recumbent bike: level 1 x 4 min  Supine DKC with LE supported on red ball 5 x 5 sec each   Bridge on red ball  - pt c/o R LE pain, stopped.   Supine L/R across body piriformis stretch x 20 sec (HEP); may also consider SKC for HEP for current c/o.  There Ex :  Toe tap to 6 inch step x 5 reps, to ~ 12 inch step x 5 reps each L/R LE  Standing alternate B heel/toe raises x 15 reps  Standing alternate L/R hip abd x 10 reps  Standing alternate hip ext x 10 reps each   B gastroc stretch on slant board 2 x 20 sec each There Ex:   S/l hip R ER x ~ 15 reps, not completed L due to c/o with tranfers  Pt c/o LBP preventing transfer to supine.   Seated cat cow x 5 reps   Seated lumbar 3 way stretch 3 x 10 sec  L/R SLS with contralateral hip flex/abd/ext x 10 reps each  Standing L/R hip flexor/gastroc stretches x 20 sec each  Standing L/R hamstrings stretch x 20 sec each     TA:   Quad cane adjusted to proper height,  advised that is is for L hand not R, which may increase tripping/fall risk.  Gait training using R quad cane, R SPC  Advised/reviewed pt may consider SPC with/without wider base, collapsible TA:   Reviewed SPC with wider base, including examples TA:   Sitting posture: feet on ground, back support  Standing posture: L = R LE WB  SPC adjusted to appropriate height, gait trained  For current c/o, HEP: skc, piriformis stretch, STM, MHP       Manual:   PROM L/R hip flexion, abduction, ER  STM L/R lumbar paraspinals/gluteals Manual:   PROM L/R hip flexion, abduction, ER Manual:   STM R gluteals and lumbar musculature  PROM L/R hip flexion, abduction, ER Manual:   STM L gluteals and lumbar musculature  PROM L/R hip flexion, abduction, ER Manual:   STM L gluteals and lumbar musculature  PROM L/R hip flexion, abduction, ER - not completed, pt declined supine position due to LBP     MHP lumbar/gluteal region x 10 min seated NM RE (Gait belt donned, PT SBA/contact guard):   AirEx: forward and lateral steps onto over with UE A x 5 reps  Gait with/without SPC: head nods yes/no, PT cuing fast/slow and stop/go  NM RE (Gait belt donned, PT SBA/contact guard):   AirEx: forward and lateral steps onto over with UE A x 5 reps  Gait with/without SPC: head nods yes/no, PT cuing fast/slow and stop/go. Adjusted SPC to proper height.     HEP: supine LTR, seated thoracic spine ext, mini squat, standing L/R hip abd with UE support, SKC, piriformis stretch    Charges: 1 TE (20 min), 1 manual (10 min), 1 NM RE (10 min) Total Timed Treatment: 40 min  Total Treatment Time: 45 min

## 2024-07-23 ENCOUNTER — OFFICE VISIT (OUTPATIENT)
Dept: PHYSICAL THERAPY | Age: 74
End: 2024-07-23
Attending: INTERNAL MEDICINE
Payer: MEDICARE

## 2024-07-23 PROCEDURE — 97110 THERAPEUTIC EXERCISES: CPT | Performed by: PHYSICAL THERAPIST

## 2024-07-23 PROCEDURE — 97112 NEUROMUSCULAR REEDUCATION: CPT | Performed by: PHYSICAL THERAPIST

## 2024-07-23 PROCEDURE — 97140 MANUAL THERAPY 1/> REGIONS: CPT | Performed by: PHYSICAL THERAPIST

## 2024-07-23 NOTE — PROGRESS NOTES
ASSESSMENT/PLAN:  1. Pulmonary HTN ([address]  Stable w/ present meds. F/u / Dr Osbaldo Mcneil as scheduled. 2. Aspiration pneumonia of both upper lobes due to regurgitated food ([address]  No reoccurrence w/ last ER for dysphagia from GERD. To GI soon. 3. Centrilobular emphysema ([address]  Continue home O2 and meds. Call if new c/o . RTSM in 4 mo. Diagnosis:   Chronic right-sided low back pain without sciatica (M54.50,G89.29)  Muscle spasm of back (M62.830)  Difficulty walking (R26.2)  Chronic bilateral low back pain without sciatica (M54.50,G89.29   Referring Provider: Annamaria Sánchez  Date of Evaluation:    6/11/2024    Precautions:  Fall Risk Next MD visit:   none scheduled  Date of Surgery: laminectomy 1990's, lumbar fusion 7/9/2019   Insurance Primary/Secondary: MEDICARE / [employer]     # Auth Visits:  POC every 10 visits         Subjective:  LB is better.  Deny R LE locking up. C/o R sided LB and gluteal c/o. Forgot cane today; I was in a rush. Would like to include balance exercises today.     Pain:  7/10    Objective:   Gait: no assistive device short distances within PT clinic, fair balance, forward rounded posture, slow keyla  + c/o/increased tone R gluteal musculature  Sit - stand transfer: pt able to complete without UE A when cued to avoid UE A    Assessment:   Pt with improved tolerance for transfers today with less c/o today. Progressing spine ROM, balance, LE strengthening. Pt required UE A for balance interventions but completed without loss of balance.  Continued SPC use advised due to fall risk/gait instability. Reviewed considerations for R gluteal c/o; see below.     Goals:   (to be met in 8-10 visits)   Patient to report independence with PT HEP to facilitate self management and to maintain progress made in PT.   Patient to have at least 70 deg active L/R rotation to facilitate transfers.   Patient to have L/R hip flexion MMT each 4/5 to facilitate tranfers.   Patient to to have TUG score less than 20 seconds using appropriate assistive device in order to reduce fall risk.   Patient to use appropriate assistive device during ambulation in order to reduce fall risk.     Plan: Continue PT 2 x weekly for up to 10 visits. Progress gait, balance, hip/spine ROM, strengthening.   Date:   6/21/2024            TX#: 3/8-10 - Pt arrived ~  10 min late Date:    6/26/2024             TX#: 4/8-10 - Pt arrived ~ 10 min late. Date:  7/16/2024                TX#: 5/8-10 Date: 7/19/2014  Tx#: 6/8-10 Date: 7/23/2024  Tx #: 7/8-10          There Ex:   Stationary recumbent bike: level 1 x 3 min  Supine SKC 5 x 5 sec each (consider for HEP to assist with reaching toes/feet)  Supine DKC with LE supported on red ball 5 x 5 sec each   Bridge on red ball x 10 reps  Supine LTR 5 x 5 sec each  Sit - stand from high plinth x 10 reps  High lateral steps at bar  x  ~ 10 feet - pt c/o hip discomfort, stopped  Standing L/R marching 10 x 2 sec   Lateral steps at bar 2 x ~ 10 feet There Ex:    Stationary recumbent bike: level 1 x 4 min  Supine DKC with LE supported on red ball 5 x 5 sec each   Bridge on red ball  - pt c/o R LE pain, stopped.   Supine L/R across body piriformis stretch x 20 sec (HEP); may also consider SKC for HEP for current c/o.  There Ex :  Toe tap to 6 inch step x 5 reps, to ~ 12 inch step x 5 reps each L/R LE  Standing alternate B heel/toe raises x 15 reps  Standing alternate L/R hip abd x 10 reps  Standing alternate hip ext x 10 reps each   B gastroc stretch on slant board 2 x 20 sec each There Ex:   S/l hip R ER x ~ 15 reps, not completed L due to c/o with tranfers  Pt c/o LBP preventing transfer to supine.   Seated cat cow x 5 reps   Seated lumbar 3 way stretch 3 x 10 sec  L/R SLS with contralateral hip flex/abd/ext x 10 reps each  Standing L/R hip flexor/gastroc stretches x 20 sec each  Standing L/R hamstrings stretch x 20 sec each   There Ex:   Supine LTR 5 x 5 sec each  Bridge on red ball x 10 reps  For R gluteal c/o, may consider: STM, piriformis stretch, MHP, progress strengthening per HEP   Supine across body piriformis stretch x 20 sec each L/R  Supine L/R figure 4 stretch x 20 sec each  Seated lumbar 3 way stretch 2 x 10 sec  Seated spine ext/pec stretch 5 x 5 sec each  Sit - stand from chair x 10 reps  Standing L/R hip flexor stretch using  staircase 2 x 20 sec each  B row using yellow tubing 2 x 10 reps        TA:   Reviewed SPC with wider base, including examples TA:   Sitting posture: feet on ground, back support  Standing posture: L = R LE WB  SPC adjusted to appropriate height, gait trained  For current c/o, HEP: skc, piriformis stretch, STM, MHP        Manual:   PROM L/R hip flexion, abduction, ER Manual:   STM R gluteals and lumbar musculature  PROM L/R hip flexion, abduction, ER Manual:   STM L gluteals and lumbar musculature  PROM L/R hip flexion, abduction, ER Manual:   STM R gluteals and lumbar musculature  PROM L/R hip flexion, abduction, ER - not completed, pt declined supine position due to LBP Manual:   STM R gluteals with/without foam roller  PROM L/R hip flexion, abduction, ER     MHP lumbar/gluteal region x 10 min seated NM RE (Gait belt donned, PT SBA/contact guard):   AirEx: forward and lateral steps onto over with UE A x 5 reps  Gait with/without SPC: head nods yes/no, PT cuing fast/slow and stop/go  NM RE (Gait belt donned, PT SBA/contact guard):   AirEx: forward and lateral steps onto over with UE A x 5 reps  Gait with/without SPC: head nods yes/no, PT cuing fast/slow and stop/go. Adjusted SPC to proper height.   NM RE (Gait belt donned, PT SBA/contact guard):   AirEx: L/R SLS with contralateral hip flex/abd/ext x 10 reps each   Gait without assistive device: head nods yes/no, PT cuing fast/slow and stop/go  Negotiating/turning within cones  High lateral steps over cones at bar 3 x ~ 16    HEP: supine LTR, seated thoracic spine ext, mini squat, standing L/R hip abd with UE support, SKC, piriformis stretch    Charges: 1 TE (20 min), 1 manual (10 min), 1 NM RE (10 min) Total Timed Treatment: 40 min  Total Treatment Time: 45 min

## 2024-07-26 ENCOUNTER — APPOINTMENT (OUTPATIENT)
Dept: PHYSICAL THERAPY | Age: 74
End: 2024-07-26
Attending: INTERNAL MEDICINE
Payer: MEDICARE

## 2024-07-26 ENCOUNTER — TELEPHONE (OUTPATIENT)
Dept: PHYSICAL THERAPY | Age: 74
End: 2024-07-26

## 2024-07-29 ENCOUNTER — APPOINTMENT (OUTPATIENT)
Dept: PHYSICAL THERAPY | Age: 74
End: 2024-07-29
Attending: INTERNAL MEDICINE
Payer: MEDICARE

## 2024-08-09 ENCOUNTER — APPOINTMENT (OUTPATIENT)
Dept: PHYSICAL THERAPY | Age: 74
End: 2024-08-09
Attending: INTERNAL MEDICINE
Payer: MEDICARE

## 2024-08-29 ENCOUNTER — APPOINTMENT (OUTPATIENT)
Dept: PHYSICAL THERAPY | Age: 74
End: 2024-08-29
Attending: INTERNAL MEDICINE
Payer: MEDICARE

## 2024-10-07 NOTE — PROGRESS NOTES
Dx:   Spondylolisthesis L3-4; LBP and L LE pain, s/p surgery 7/9/2019  Insurance (Authorized # of Visits):  Re-assess every 10 visits  Authorizing Physician: Dr. Russell Lundy Next MD visit: 1/20/2020   Fall Risk: standard - moderate         Precautions:   hx o block using SPC. - nearly met  7. Patient to reciprocally negotiate stairs up/down c UE A. - met up, difficulty/weakness down    Plan: Continue PT 2 x weekly for up to 25 visits.     Date: 12/9/2019  Tx #: 15/16 Date: 12/13/2019  Tx#: 16/25 Date: 12/27/2019 30 sec each   Standing L/R hamstrings stretch x 20 sec each There Ex:   Supine L/R hip flexor stretch x 60 sec each  Hip flexion/toe taps to x 15 reps each L/R to 12 inch step  Mini staircase ( 4, ~ 6 inch steps): up/down c unilateral UE A x2 reps  6 inch Expected Date Of Service: 10/07/2024 Billing Type: Third-Party Bill Bill For Surgical Tray: no Performing Laboratory: [phone]

## 2024-10-08 ENCOUNTER — ORDER TRANSCRIPTION (OUTPATIENT)
Dept: PHYSICAL THERAPY | Facility: HOSPITAL | Age: 74
End: 2024-10-08

## 2024-10-08 DIAGNOSIS — S93.491A SPRAIN OF ANTERIOR TALOFIBULAR LIGAMENT OF RIGHT ANKLE: Primary | ICD-10-CM

## 2024-10-09 ENCOUNTER — ORDER TRANSCRIPTION (OUTPATIENT)
Dept: PHYSICAL THERAPY | Facility: HOSPITAL | Age: 74
End: 2024-10-09

## 2024-10-09 DIAGNOSIS — M54.50 LOW BACK PAIN: Primary | ICD-10-CM

## 2024-10-09 DIAGNOSIS — M62.830 MUSCLE SPASM OF BACK: ICD-10-CM

## 2024-10-09 DIAGNOSIS — R26.2 DIFFICULTY WALKING: ICD-10-CM

## 2024-10-30 ENCOUNTER — OFFICE VISIT (OUTPATIENT)
Dept: PHYSICAL THERAPY | Age: 74
End: 2024-10-30
Payer: MEDICARE

## 2024-10-30 DIAGNOSIS — S93.491A SPRAIN OF ANTERIOR TALOFIBULAR LIGAMENT OF RIGHT ANKLE: Primary | ICD-10-CM

## 2024-10-30 PROCEDURE — 97162 PT EVAL MOD COMPLEX 30 MIN: CPT | Performed by: PHYSICAL THERAPIST

## 2024-10-30 PROCEDURE — 97530 THERAPEUTIC ACTIVITIES: CPT | Performed by: PHYSICAL THERAPIST

## 2024-10-30 PROCEDURE — 97110 THERAPEUTIC EXERCISES: CPT | Performed by: PHYSICAL THERAPIST

## 2024-10-30 NOTE — PROGRESS NOTES
LOWER EXTREMITY EVALUATION:     Diagnosis:   R ankle sprain    Referring Provider: Willem Ramos DPM   Date of Evaluation:    10/30/2024    Precautions:  fall risk, chronic LBP/LE  Next MD visit:   11/23/2024  Date of Surgery: n/a     PATIENT SUMMARY   Melisa Agosto is a 74 year old female who presents to therapy today with complaints of lateral R ankle pain limiting standing to ~ 2 min, taking time with stairs, walking limited, c/o anytime with WB, pain awakes her from sleep. Pt reports less c/o with certain footwear .  Pt reports sprained R ankle about ~ 3 weeks ago when sprained R ankle in backyard when children tried to enter her yard, dog became agitated. Pt reports twisted ankle during incident, took pain medication but c/o persist, reports some bruising following.  She consulted DPM since was unable to get into see PCP: diagnosed with ankle sprain, ankle brace 6-8 weeks, MRI  if c/o persist, referred to PT.  She has previously seen DPM for feet issues. Pt with hx of chronic back pain.     Pt describes pain level current 6/10, at best 6/10, at worst 10/10.   Current functional limitations include: lateral R ankle pain limiting standing to ~ 2 min, taking time with stairs, walking limited, c/o anytime with WB, pain awakes her from sleep.     Melisa describes prior level of function: pt denies any hx of R ankle c/o. Pt goals include:  being able to bend forward to reach foot (pt reports related to foot stiffness and LB issues), to be able to walk dog.  Past medical history was reviewed with Melisa. Significant findings include:     history of lumbar laminectomy 1990's, R shoulder rotator cuff repair, stent, bunionectomy, heel surgery related to work injury.    S/P colonoscopic polypectomy 1/4/2019   Diverticulosis large intestine w/o perforation or abscess w/o bleeding 1/4/2019   Internal hemorrhoids without complication 1/4/2019   Breast mass 10/24/2018   Hyperthyroidism 7/3/2018    Abnormal mammogram 1/17/2018   Osteopenia 1/17/2018   L3-4 mod-severe, L2-3 mod-severe, L1-2 mod central stenosis 11/6/2017   Sciatica of left side 10/17/2017   Coronary artery disease involving native coronary artery of native heart without angina pectoris 9/18/2017   S/P drug eluting coronary stent placement 9/18/2017   Prediabetes 9/12/2017   Other chest pain 9/12/2017   Lumbar post-laminectomy syndrome: L2-S1 laminectomies 8/3/2017   L5-S1 left mod/right severe, L4-5 left mild-mod/right severe, L3-4 left severe, L2-3 left mod-severe foraminal stenosis 8/3/2017   L2-3 left mild HNP, L3-4 left mild HNP, L4-5 right mild HNP 8/3/2017   L3-4 grade 1-2 stable spondylolisthesis 8/3/2017   History of hysterectomy for benign disease 9/3/2015   Dyslipidemia 5/28/2015       ASSESSMENT  Melisa presents to physical therapy evaluation with primary c/o R ankle pain. The results of the objective tests and measures show: + c/o lateral R ankle, ankle/toe ROM deficits, gastroc tightness, R ankle eversion weakness, function as detailed below.  Functional deficits include but are not limited to:  R ankle pain limiting standing to ~ 2 min, taking time with stairs, walking limited, c/o anytime with WB, pain awakes her from sleep. Pt reports less c/o with certain footwear .  Signs and symptoms are consistent with diagnosis of R ankle sprain. Pt and PT discussed evaluation findings, pathology, POC and HEP.  Pt voiced understanding and performs HEP correctly without reported pain. Skilled Physical Therapy is medically necessary to address the above impairments and reach functional goals.     OBJECTIVE:   Observation: R ankle brace donned. Min - no edema R ankle.   Palpation: + c/o  R lateral ankle at anterior talofibular ligament    AROM (degrees): (* denotes performed with pain)  Foot/Ankle   DF: R -10; L -10  PF: R 50; L 50  INV: R 18; L 20  EV: R 12; L 30  Restriction B feet digit flex/ext     Accessory motion: deferred due to  ankle brace    Flexibility:  Gastroc-soleus: R/L restriction    Strength/MMT: (* denotes performed with pain)  Foot/Ankle   DF: R 5/5; L 5/5  PF: R/L each at least 2/5, not tested in standing due to c/o  INV: R 5/5; L 5/5  EV: R 4-/5*; L 5/5       Gait: pt ambulates on level ground with R LE ER, knee remains extended  Stairs: pt negotiates stairs reciprocally.   Balance: SLS: R unable without UE A, L 1 sec    Today’s Treatment and Response:   Pt education was provided on exam findings, treatment diagnosis, treatment plan, expectations, and prognosis. Pt was also provided recommendations for: gait: heel - toe gait pattern; stairs: up with L LE/down with R LE as needed, reciprocally as well tolerated.   Patient was instructed in and issued a HEP for: ankle AROM PF/DF, inversion/eversion, toe curls, long sitting gastroc stretch    Charges: PT Eval Moderate Complexity, 1 TE (12 min), 1 TA (12 min)      Total Timed Treatment: 24 min     Total Treatment Time: 45 min     Based on clinical rationale and outcome measures, this evaluation involved Moderate Complexity decision making due to 1-2 personal factors/comorbidities, 3 body structures involved/activity limitations, and evolving symptoms including changing pain levels, co- morbidities, chronic LBP  PLAN OF CARE:    Goals: (to be met in 8-10 visits)  Patient to report independence with PT HEP to facilitate self management.   Patient to have at least L/R ankle DF to at least neutral to facilitate toe clearance/normal gait mechanics.   Patient to complete L/R SLS each at least 3 sec in order to reduce risk for ankle instability.   Patient to walk/stand each at least 5 min unlimited by R ankle c/o.   Patient to have R ankle eversion MMT 5/5 in order to reduce risk for R ankle instability.   Patient to reciprocally negotiate stairs unlimited by R ankle.     Frequency / Duration: Patient will be seen for 2 x/week or a total of 6-10 visits over a 90 day period. Treatment will  include: Gait training, Manual Therapy, Neuromuscular Re-education, Self-Care Home Management, Therapeutic Activities, Therapeutic Exercise, Home Exercise Program instruction, and Modalities to include: Electrical stimulation (unattended) and Ultrasound, MHP, cold pack    Education or treatment limitation: None  Rehab Potential: good, pt with hx of chronic pain related to lumbar spine    LEFS Score  No data recorded - No PSR to administer, insufficient time during PT Evaluation    Patient/Family/Caregiver was advised of these findings, precautions, and treatment options and has agreed to actively participate in planning and for this course of care.    Thank you for your referral. Please co-sign or sign and return this letter via fax as soon as possible to [phone]. If you have any questions, please contact me at Dept: [phone]    Sincerely,  Electronically signed by therapist: Kalina Beckham, PT  Physician's certification required: Yes  I certify the need for these services furnished under this plan of treatment and while under my care.    X___________________________________________________ Date____________________    Certification From: 10/30/2024  To:1/28/2025      Gurdeep faxed to DPM for signature

## 2024-11-04 ENCOUNTER — APPOINTMENT (OUTPATIENT)
Dept: PHYSICAL THERAPY | Age: 74
End: 2024-11-04
Payer: MEDICARE

## 2024-11-06 ENCOUNTER — APPOINTMENT (OUTPATIENT)
Dept: PHYSICAL THERAPY | Age: 74
End: 2024-11-06
Payer: MEDICARE

## 2024-11-06 ENCOUNTER — OFFICE VISIT (OUTPATIENT)
Dept: PHYSICAL THERAPY | Age: 74
End: 2024-11-06
Payer: MEDICARE

## 2024-11-06 PROCEDURE — 97530 THERAPEUTIC ACTIVITIES: CPT | Performed by: PHYSICAL THERAPIST

## 2024-11-06 PROCEDURE — 97140 MANUAL THERAPY 1/> REGIONS: CPT | Performed by: PHYSICAL THERAPIST

## 2024-11-06 PROCEDURE — 97110 THERAPEUTIC EXERCISES: CPT | Performed by: PHYSICAL THERAPIST

## 2024-11-06 NOTE — PROGRESS NOTES
Diagnosis:   R ankle sprain Referring Provider: Willem Ramos DPM  Date of Evaluation:    10/30/2024    Precautions:  fall risk, chronic LBP/LE Next MD visit:   11/19/2024  Date of Surgery: n/a   Insurance Primary/Secondary: MEDICARE / [employer] [place]     # Auth Visits: POC every 10 visits            Subjective: I think that the ankle brace is causing increased ankle pain, c/o B LE edema. Pt reports took a pain pill.     Pain: 10/10       Objective:     B LE edema, mild pitting, distal to knees/feet    Assessment:  Advised PCP consultation re: B LE edema; reviewed considerations, see below.  B LE edema may suggest unrelated to ankle sleeve/orthosis. Advised DPM f/u re: c/o discomfort using ankle brace. Encouraged compliance with ankle brace at this time due to ankle weakness, risk for instability/sprain. Pt with improved form with HEP following review. Pt tolerated session well overall.       Goals:    (to be met in 8-10 visits)  Patient to report independence with PT HEP to facilitate self management.   Patient to have at least L/R ankle DF to at least neutral to facilitate toe clearance/normal gait mechanics.   Patient to complete L/R SLS each at least 3 sec in order to reduce risk for ankle instability.   Patient to walk/stand each at least 5 min unlimited by R ankle c/o.   Patient to have R ankle eversion MMT 5/5 in order to reduce risk for R ankle instability.   Patient to reciprocally negotiate stairs unlimited by R ankle.     Plan: Continue PT 2 x weekly for 6-10 visits. F/u on LE edema, brace.  Progress R ankle AROM, strength, stability, proprioception.   Date: 11/6/2024  TX#: 2/6-10 Date:                 TX#: 3/ Date:                 TX#: 4/ Date:                 TX#: 5/ Date:   Tx#: 6/   There Ex:     HEP review; see below:  R ankle PF/DF and inversion/eversion AROM 5 x 5 sec each - cuing form (HEP)  R foot toe curls x 10 reps - cuing form (HEP)  Seated R ankle eversion using YTB x ~ 15  reps  Long sitting R gastroc stretch using strap 2 x 20 sec  (HEP)  Seated R ankle circles using round board x 10 reps each clockwise/counterclockwise  Seated R ankle DF mobilization 5 x 10 sec each       Manual:   R ankle edema mobilization  Gentle cross friction massage R anterior talofibular ligament  Grade II R talocrural ant/post glides   PROM R ankle PF, DF, inversion, eversion         TA:  Pt education re: contact PCP re: LE edema, may consider LE elevation with/without ankle pumps, compression socks  Tandem balance, with arm circles, sweeps                 HEP: ankle AROM PF/DF, inversion/eversion, toe curls, long sitting gastroc stretch    Charges: 1 TE (25 min) - Medicare, 1 manual (10 min), 1 TA (10 min)     Total Timed Treatment: 45 min  Total Treatment Time: 45 min

## 2024-11-11 ENCOUNTER — OFFICE VISIT (OUTPATIENT)
Dept: PHYSICAL THERAPY | Age: 74
End: 2024-11-11
Payer: MEDICARE

## 2024-11-11 ENCOUNTER — APPOINTMENT (OUTPATIENT)
Dept: PHYSICAL THERAPY | Age: 74
End: 2024-11-11
Payer: MEDICARE

## 2024-11-11 PROCEDURE — 97140 MANUAL THERAPY 1/> REGIONS: CPT | Performed by: PHYSICAL THERAPIST

## 2024-11-11 PROCEDURE — 97110 THERAPEUTIC EXERCISES: CPT | Performed by: PHYSICAL THERAPIST

## 2024-11-11 NOTE — PROGRESS NOTES
Diagnosis:   R ankle sprain Referring Provider: Willem Ramos DPM  Date of Evaluation:    10/30/2024    Precautions:  fall risk, chronic LBP/LE Next MD visit:   11/19/2024  Date of Surgery: n/a   Insurance Primary/Secondary: MEDICARE / [employer]     # Auth Visits: POC every 10 visits            Subjective: Less ankle swelling. Completing HEP.  See DPM 11/20 and PCP 11/21.     Pain: 7/10       Objective:     B LE pitting edema medial ankles  Gross restrictions R ankle AROM    Assessment:    Pt has f/u appointments with DPM re: ankle sprain and PCP re: LE edema. Progressing AROM ROM, strengthening, proprioception and HEP to address deficits related to ankle sprain.  Pt tolerated session well, requires A to don/doff R ankle sleeve.     Goals:    (to be met in 8-10 visits)  Patient to report independence with PT HEP to facilitate self management.   Patient to have at least L/R ankle DF to at least neutral to facilitate toe clearance/normal gait mechanics.   Patient to complete L/R SLS each at least 3 sec in order to reduce risk for ankle instability.   Patient to walk/stand each at least 5 min unlimited by R ankle c/o.   Patient to have R ankle eversion MMT 5/5 in order to reduce risk for R ankle instability.   Patient to reciprocally negotiate stairs unlimited by R ankle.     Plan: Continue PT 2 x weekly for 6-10 visits.   Progress R ankle AROM, strength, stability, proprioception, WB.    Date: 11/6/2024  TX#: 2/6-10 Date:  11/11/2024               TX#: 3/6-10 - Pt arrived ~ 15 min late.  Date:                 TX#: 4/ Date:                 TX#: 5/ Date:   Tx#: 6/ There Ex:     HEP review; see below:  R ankle PF/DF and inversion/eversion AROM 5 x 5 sec each - cuing form (HEP)  R foot toe curls x 10 reps - cuing form (HEP)  Seated R ankle eversion using YTB x ~ 15 reps  Long sitting R gastroc stretch using strap 2 x 20 sec  (HEP)  Seated R ankle circles using round board x 10 reps each  clockwise/counterclockwise  Seated R ankle DF mobilization 5 x 10 sec each There Ex:   R ankle inversion and eversion using YTB x 20 reps each  Seated B heel/toe raises x 20 reps  Seated R ankle circles using round board x 10 reps each clockwise/counterclockwise  Standing L/R hip abd 2 x  10 reps         Manual:   R ankle edema mobilization  Gentle cross friction massage R anterior talofibular ligament  Grade II R talocrural ant/post glides   PROM R ankle PF, DF, inversion, eversion   Manual:   Grade II R talocrural ant/post glides   PROM R ankle PF, DF, inversion, eversion  R gastroc stretches 3 x 20 sec each       TA:  Pt education re: contact PCP re: LE edema, may consider LE elevation with/without ankle pumps, compression socks  Tandem balance, with arm circles, sweeps    NM RE:  Tandem balance x 20 sec each (HEP)             HEP: ankle AROM PF/DF, inversion/eversion, toe curls, long sitting gastroc stretch, tandem balance with/without arm sweeps at countertop    Charges: 1 TE (20 min), 1 manual (10 min), 0 NM RE ( 1 min) Total Timed Treatment: 31 min  Total Treatment Time: 32 min

## 2024-11-13 ENCOUNTER — APPOINTMENT (OUTPATIENT)
Dept: PHYSICAL THERAPY | Age: 74
End: 2024-11-13
Payer: MEDICARE

## 2024-11-13 ENCOUNTER — OFFICE VISIT (OUTPATIENT)
Dept: PHYSICAL THERAPY | Age: 74
End: 2024-11-13
Payer: MEDICARE

## 2024-11-13 PROCEDURE — 97110 THERAPEUTIC EXERCISES: CPT | Performed by: PHYSICAL THERAPIST

## 2024-11-13 PROCEDURE — 97140 MANUAL THERAPY 1/> REGIONS: CPT | Performed by: PHYSICAL THERAPIST

## 2024-11-13 NOTE — PROGRESS NOTES
Diagnosis:   R ankle sprain Referring Provider: Willem Ramos DPM  Date of Evaluation:    10/30/2024    Precautions:  fall risk, chronic LBP/LE Next MD visit:   11/19/2024  Date of Surgery: n/a   Insurance Primary/Secondary: MEDICARE / [employer]     # Auth Visits: POC every 10 visits            Subjective: Less ankle swelling. Ankle is improving, would like next several PT sessions to focus on my ankle, can delay PT for LBP. Completing HEP.  See DPM 11/19 and PCP 11/21.     Pain: 7/10       Objective:   Min edema R ankle  Gross restrictions R ankle AROM    Assessment:   Pt with less R ankle edema, improved WB tolerance. Pt progressing well overall, consider weaning from R ankle brace, pending DPM f/u next week.  Pt requires A to don/doff R ankle sleeve. Tardy arrival continues to limit PT session content.     Goals:    (to be met in 8-10 visits)  Patient to report independence with PT HEP to facilitate self management.   Patient to have at least L/R ankle DF to at least neutral to facilitate toe clearance/normal gait mechanics.   Patient to complete L/R SLS each at least 3 sec in order to reduce risk for ankle instability.   Patient to walk/stand each at least 5 min unlimited by R ankle c/o.   Patient to have R ankle eversion MMT 5/5 in order to reduce risk for R ankle instability.   Patient to reciprocally negotiate stairs unlimited by R ankle.     Plan: Continue PT 2 x weekly for 6-10 visits.   Progress R ankle AROM, strength, stability, proprioception, WB.    Date: 11/6/2024  TX#: 2/6-10 Date:  11/11/2024               TX#: 3/6-10 - Pt arrived ~ 15 min late.  Date:     11/13/2024            TX#: 4/6-10 - Pt arrived ~ 10 min late. Date:                 TX#: 5/ Date:   Tx#: 6/ There Ex:     HEP review; see below:  R ankle PF/DF and inversion/eversion AROM 5 x 5 sec each - cuing form (HEP)  R foot toe curls x 10 reps - cuing form (HEP)  Seated R ankle eversion using YTB x ~ 15 reps  Long sitting  R gastroc stretch using strap 2 x 20 sec  (HEP)  Seated R ankle circles using round board x 10 reps each clockwise/counterclockwise  Seated R ankle DF mobilization 5 x 10 sec each There Ex:   R ankle inversion and eversion using YTB x 20 reps each  Seated B heel/toe raises x 20 reps  Seated R ankle circles using round board x 10 reps each clockwise/counterclockwise  Standing L/R hip abd 2 x  10 reps    There Ex:   R ankle circles x 10 reps each clockwise/counterclockwise  R ankle inversion and eversion using YTB x 20 reps each  Standing B heel/toe raises 2 x 10 reps  Standing L/R gastroc stretch 2 x 20 sec       Manual:   R ankle edema mobilization  Gentle cross friction massage R anterior talofibular ligament  Grade II R talocrural ant/post glides   PROM R ankle PF, DF, inversion, eversion   Manual:   Grade II R talocrural ant/post glides   PROM R ankle PF, DF, inversion, eversion  R gastroc stretches 3 x 20 sec each  Manual:   Grade II R talocrural ant/post glides   PROM R ankle PF, DF, inversion, eversion  R gastroc stretches 3 x 20 sec each   R foot MTP/IP flex/ext PROM     TA:  Pt education re: contact PCP re: LE edema, may consider LE elevation with/without ankle pumps, compression socks  Tandem balance, with arm circles, sweeps    NM RE:  Tandem balance x 20 sec each (HEP) NM RE:   AirEx: Alternate L/R LE marching 10 x 3 sec each            HEP: ankle AROM PF/DF, inversion/eversion, toe curls, long sitting gastroc stretch - progress to standing , tandem balance with/without arm sweeps at countertop    Charges: 1 TE (20 min), 1 manual (10 min), 0 NM RE ( 1 min) Total Timed Treatment: 31 min  Total Treatment Time: 35 min

## 2024-11-18 ENCOUNTER — APPOINTMENT (OUTPATIENT)
Dept: PHYSICAL THERAPY | Age: 74
End: 2024-11-18
Payer: MEDICARE

## 2024-11-18 ENCOUNTER — OFFICE VISIT (OUTPATIENT)
Dept: PHYSICAL THERAPY | Age: 74
End: 2024-11-18
Payer: MEDICARE

## 2024-11-18 DIAGNOSIS — R26.2 DIFFICULTY WALKING: ICD-10-CM

## 2024-11-18 DIAGNOSIS — M54.50 LOW BACK PAIN: Primary | ICD-10-CM

## 2024-11-18 DIAGNOSIS — M62.830 MUSCLE SPASM OF BACK: ICD-10-CM

## 2024-11-18 PROCEDURE — 97112 NEUROMUSCULAR REEDUCATION: CPT | Performed by: PHYSICAL THERAPIST

## 2024-11-18 PROCEDURE — 97110 THERAPEUTIC EXERCISES: CPT | Performed by: PHYSICAL THERAPIST

## 2024-11-18 PROCEDURE — 97140 MANUAL THERAPY 1/> REGIONS: CPT | Performed by: PHYSICAL THERAPIST

## 2024-11-18 NOTE — PROGRESS NOTES
Diagnosis:   R ankle sprain Referring Provider: Physician Jewel, SARTHAK  Date of Evaluation:    10/30/2024    Precautions:  fall risk, chronic LBP/LE Next MD visit:   11/19/2024  Date of Surgery: n/a   Insurance Primary/Secondary: MEDICARE / [employer]     # Auth Visits: POC every 10 visits            Subjective: Less ankle swelling. Ankle is improving, less pain.  See DPM 11/19 and PCP 11/21. Pt reports uses 1 LE for steps - up with good, down with bad.   Pain: 6/10       Objective:   Min edema R ankle, mod L ankle  Gross restrictions R ankle AROM > PROM  Gait: pt able to ambulate in PT clinic for short distances without ankle orthosis, R LE ER, no assistive devices, fair gait stability.    Assessment:   Pt progressing: less R ankle c/o/pain, improved PT/ADL tolerance, less edema. Completed limited interventions without R ankle orthosis donned; pt tolerated well without ankle instability. Gait improved following gait training. Pt with DPM appointment tomorrow; advised pt to inquire re: when able to wean from orthosis.     Goals:    (to be met in 8-10 visits)  Patient to report independence with PT HEP to facilitate self management.   Patient to have at least L/R ankle DF to at least neutral to facilitate toe clearance/normal gait mechanics.   Patient to complete L/R SLS each at least 3 sec in order to reduce risk for ankle instability.   Patient to walk/stand each at least 5 min unlimited by R ankle c/o.   Patient to have R ankle eversion MMT 5/5 in order to reduce risk for R ankle instability.   Patient to reciprocally negotiate stairs unlimited by R ankle.     Plan: Continue PT 2 x weekly for 6-10 visits.   F/u on DPM appointment, orthosis status. Progress  steps, R ankle AROM, strength, stability, proprioception, WB.    Date: 11/6/2024  TX#: 2/6-10 Date:  11/11/2024               TX#: 3/6-10 - Pt arrived ~ 15 min late.  Date:     11/13/2024            TX#: 4/6-10 - Pt arrived ~ 10 min late. Date:    11/18/2024            TX#: 5/6-10 Date:   Tx#: 6/ There Ex:     HEP review; see below:  R ankle PF/DF and inversion/eversion AROM 5 x 5 sec each - cuing form (HEP)  R foot toe curls x 10 reps - cuing form (HEP)  Seated R ankle eversion using YTB x ~ 15 reps  Long sitting R gastroc stretch using strap 2 x 20 sec  (HEP)  Seated R ankle circles using round board x 10 reps each clockwise/counterclockwise  Seated R ankle DF mobilization 5 x 10 sec each There Ex:   R ankle inversion and eversion using YTB x 20 reps each  Seated B heel/toe raises x 20 reps  Seated R ankle circles using round board x 10 reps each clockwise/counterclockwise  Standing L/R hip abd 2 x  10 reps    There Ex:   R ankle circles x 10 reps each clockwise/counterclockwise  R ankle inversion and eversion using YTB x 20 reps each  Standing B heel/toe raises 2 x 10 reps  Standing L/R gastroc stretch 2 x 20 sec   There Ex:   R ankle alphabet x 1   R ankle inversion and eversion using RTB x 20 reps each  Standing B heel/toe raises 2 x 10 reps  B gastroc stretch on slant board 2 x 30 sec each  4 inch step up x 5 reps R LE, up over down using R LE x 5 reps     Manual:   R ankle edema mobilization  Gentle cross friction massage R anterior talofibular ligament  Grade II R talocrural ant/post glides   PROM R ankle PF, DF, inversion, eversion   Manual:   Grade II R talocrural ant/post glides   PROM R ankle PF, DF, inversion, eversion  R gastroc stretches 3 x 20 sec each  Manual:   Grade II R talocrural ant/post glides   PROM R ankle PF, DF, inversion, eversion  R gastroc stretches 3 x 20 sec each   R foot MTP/IP flex/ext PROM Manual:   Grade II R talocrural ant/post glides   PROM R ankle PF, DF, inversion, eversion  R gastroc stretches 3 x 20 sec each   R foot MTP/IP flex/ext PROM    TA:  Pt education re: contact PCP re: LE edema, may consider LE elevation with/without ankle pumps, compression socks  Tandem balance, with arm circles, sweeps    NM RE:  Tandem  balance x 20 sec each (HEP) NM RE:   AirEx: Alternate L/R LE marching 10 x 3 sec each NM RE:   Gait training: cuing for heel to toe gait pattern, R LE neutral (avoid excessive ER)  Heel to toe walking forward/backward 2 x ~ 16 feet   Marching (pt reports completing at home, advised to hold a few seconds): 5 x 5 sec   AirEx: Alternate L/R LE marching 10 x 3 sec each           HEP: ankle AROM PF/DF, inversion/eversion, toe curls - discontinue , standing gastroc stretch, tandem balance with/without arm sweeps at countertop, marching at countertop    Charges: 1 TE (20 min), 1 manual (10 min), 1 NM RE ( 10 min) Total Timed Treatment: 40 min  Total Treatment Time: 42 min

## 2024-11-20 ENCOUNTER — APPOINTMENT (OUTPATIENT)
Dept: PHYSICAL THERAPY | Age: 74
End: 2024-11-20
Payer: MEDICARE

## 2024-11-25 ENCOUNTER — OFFICE VISIT (OUTPATIENT)
Dept: PHYSICAL THERAPY | Age: 74
End: 2024-11-25
Payer: MEDICARE

## 2024-11-25 PROCEDURE — 97110 THERAPEUTIC EXERCISES: CPT | Performed by: PHYSICAL THERAPIST

## 2024-11-25 PROCEDURE — 97140 MANUAL THERAPY 1/> REGIONS: CPT | Performed by: PHYSICAL THERAPIST

## 2024-11-25 NOTE — PROGRESS NOTES
ProgressSummary  Pt has attended 6 visits in Physical Therapy.     Diagnosis:   R ankle sprain Referring Provider:  Willem Ramos DPM  Date of Evaluation:    10/30/2024    Precautions:  fall risk, chronic LBP/LE Next MD visit:   12/5/2024  Date of Surgery: n/a   Insurance Primary/Secondary: MEDICARE / [employer]     # Auth Visits: POC every 10 visits            Subjective: Rescheduled DPM appointment due to parking issue, now f/u with him on 12/5/2024.  Did not wear my ankle brace at home over the weekend without c/o; the brace bothers me.  Had f/u with PCP, thinks swelling is related to ankle sprain; discussed compression socks, LE elevation, MRI if ankle does not improve.  Discussed computer ergonomics, sitting for LB/LE c/o related to spinal stenosis when walking. C/o hamstrings/gastroc cramps when sitting. Completing HEP.     Pain:  4/10       Objective:   Min edema R ankle, min - mod L ankle  Gross restrictions R ankle AROM > PROM  Gait: pt able to ambulate in PT clinic for short distances without ankle orthosis, no assistive devices, ankles stable    Observation: R ankle brace donned.       AROM (degrees): (* denotes performed with pain)  Foot/Ankle   DF: R -5; L -  PF: R 50; L 50  INV: R 20; L 20  EV: R 15; L 30  Restriction B feet digit flex/ext     Accessory motion: mild restriction R ankle talocrural ant/post glide    Flexibility:  Gastroc-soleus: R/L restriction    Strength/MMT: (* denotes performed with pain)  Foot/Ankle   DF: R 5/5; L 5/5  PF: R unable * c/o weakness/pain, L at least 3/5  INV: R 5/5; L 5/5  EV: R 4/5; L 5/5     Stairs: pt negotiates 6 inch stairs reciprocally.   Balance: SLS: R ~ 1 sec, c/o pain limits; L ~5  sec    Assessment:   Pt progressing: less R ankle c/o/pain, improved PT/ADL tolerance, less edema, improved ROM, strength. Pt able to walk short distances in PT clinic and at home (per pt) without c/o, without ankle instability.  Completed limited interventions  without R ankle orthosis donned; pt tolerated well without ankle instability with with some duration limitations. Advised ankle brace use, per DPM, including outdoors. Pt will benefit from limited number of sessions to address residual R ankle AROM, strength, proprioceptive, functional deficits.   Goals:    (to be met in 8-10 visits)  Patient to report independence with PT HEP to facilitate self management. - MET  Patient to have at least L/R ankle DF to at least neutral to facilitate toe clearance/normal gait mechanics. - PROGRESS  Patient to complete L/R SLS each at least 3 sec in order to reduce risk for ankle instability. - PROGRESS  Patient to walk/stand each at least 5 min unlimited by R ankle c/o. - PROGRESS  Patient to have R ankle eversion MMT 5/5 in order to reduce risk for R ankle instability. - PROGRESS   Patient to reciprocally negotiate stairs unlimited by R ankle. - NEARLY MET    Plan: Continue PT 2 x weekly for 10-12 visits.   Progress  steps, R ankle AROM, strength, stability, proprioception, WB.    Date: 11/6/2024  TX#: 2/6-10 Date:  11/11/2024               TX#: 3/6-10 - Pt arrived ~ 15 min late.  Date:     11/13/2024            TX#: 4/6-10 - Pt arrived ~ 10 min late. Date:   11/18/2024            TX#: 5/6-10 Date: 11/25/2024  Tx#: 6/10-12   There Ex:     HEP review; see below:  R ankle PF/DF and inversion/eversion AROM 5 x 5 sec each - cuing form (HEP)  R foot toe curls x 10 reps - cuing form (HEP)  Seated R ankle eversion using YTB x ~ 15 reps  Long sitting R gastroc stretch using strap 2 x 20 sec  (HEP)  Seated R ankle circles using round board x 10 reps each clockwise/counterclockwise  Seated R ankle DF mobilization 5 x 10 sec each There Ex:   R ankle inversion and eversion using YTB x 20 reps each  Seated B heel/toe raises x 20 reps  Seated R ankle circles using round board x 10 reps each clockwise/counterclockwise  Standing L/R hip abd 2 x  10 reps    There Ex:   R ankle circles x 10 reps  each clockwise/counterclockwise  R ankle inversion and eversion using YTB x 20 reps each  Standing B heel/toe raises 2 x 10 reps  Standing L/R gastroc stretch 2 x 20 sec   There Ex:   R ankle alphabet x 1   R ankle inversion and eversion using RTB x 20 reps each  Standing B heel/toe raises 2 x 10 reps  B gastroc stretch on slant board 2 x 30 sec each  4 inch step up x 5 reps R LE, up over down using R LE x 5 reps  There Ex:   R ankle alphabet x 1   R ankle inversion and eversion using RTB x 20 reps each  Seated hamstrings/gastroc stretch for c/o cramps  when sitting  Standing B heel/toe raises 2 x 10 reps (HEP)  B gastroc stretch on slant board 2 x 30 sec each  Mini staircase negotiation (4, ~ 6 inch steps) x 1   Seated R ankle AROM rockerboard taps PF/DF and inversion/eversion x 10 reps each, pt declined standing due to reporting increased ankle c/o  HEP review; see below.      Manual:   R ankle edema mobilization  Gentle cross friction massage R anterior talofibular ligament  Grade II R talocrural ant/post glides   PROM R ankle PF, DF, inversion, eversion   Manual:   Grade II R talocrural ant/post glides   PROM R ankle PF, DF, inversion, eversion  R gastroc stretches 3 x 20 sec each  Manual:   Grade II R talocrural ant/post glides   PROM R ankle PF, DF, inversion, eversion  R gastroc stretches 3 x 20 sec each   R foot MTP/IP flex/ext PROM Manual:   Grade II R talocrural ant/post glides   PROM R ankle PF, DF, inversion, eversion  R gastroc stretches 3 x 20 sec each   R foot MTP/IP flex/ext PROM Manual:   Grade II R talocrural ant/post glides   PROM R ankle PF, DF, inversion, eversion  R gastroc stretches 3 x 20 sec each   R foot MTP/IP flex/ext PROM  Rhythmic stabilization R ankle x ~30 sec   TA:  Pt education re: contact PCP re: LE edema, may consider LE elevation with/without ankle pumps, compression socks  Tandem balance, with arm circles, sweeps    NM RE:  Tandem balance x 20 sec each (HEP) NM RE:   AirEx:  Alternate L/R LE marching 10 x 3 sec each NM RE:   Gait training: cuing for heel to toe gait pattern, R LE neutral (avoid excessive ER)  Heel to toe walking forward/backward 2 x ~ 16 feet   Marching (pt reports completing at home, advised to hold a few seconds): 5 x 5 sec   AirEx: Alternate L/R LE marching 10 x 3 sec each NM RE:  Heel to toe walking forward/backward 2 x ~ 16 feet    AirEx: Alternate L/R LE marching 10 x 3 sec each    TA: Brief review of computer ergonomics, per pt inquiry          HEP: ankle AROM PF/DF, inversion/eversion, toe curls - discontinue , standing heel/toe raises,  standing gastroc stretch, tandem balance with/without arm sweeps at countertop, marching at countertop    Charges: 2 TE (25 min), 1 manual (12 min), 1 NM RE (5 min), 0 TA (3 min) Total Timed Treatment: 45 min  Total Treatment Time: 48 min      Plan: Continue skilled Physical Therapy 2 x/week or a total of 10-12 visits over a 90 day period. Treatment will include: there ex, there activities, manual therapy, NM Re-ed and modalities as needed.        Patient/Family/Caregiver was advised of these findings, precautions, and treatment options and has agreed to actively participate in planning and for this course of care.    Thank you for your referral. If you have any questions, please contact me at Dept: [phone].    Sincerely,  Electronically signed by therapist: Kalina Beckham, PT     Physician's certification required:  Yes  Please co-sign or sign and return this letter via fax as soon as possible to [phone].   I certify the need for these services furnished under this plan of treatment and while under my care.    X___________________________________________________ Date____________________    Certification From: 11/25/2024  To:2/23/2025

## 2024-12-02 ENCOUNTER — APPOINTMENT (OUTPATIENT)
Dept: PHYSICAL THERAPY | Age: 74
End: 2024-12-02
Payer: MEDICARE

## 2024-12-04 ENCOUNTER — OFFICE VISIT (OUTPATIENT)
Dept: PHYSICAL THERAPY | Age: 74
End: 2024-12-04
Payer: MEDICARE

## 2024-12-04 PROCEDURE — 97110 THERAPEUTIC EXERCISES: CPT | Performed by: PHYSICAL THERAPIST

## 2024-12-04 PROCEDURE — 97112 NEUROMUSCULAR REEDUCATION: CPT | Performed by: PHYSICAL THERAPIST

## 2024-12-04 NOTE — PROGRESS NOTES
Diagnosis:   R ankle sprain Referring Provider:  Willem Ramos DPM  Date of Evaluation:    10/30/2024    Precautions:  fall risk, chronic LBP/LE Next MD visit:   12//21/2024  Date of Surgery: n/a   Insurance Primary/Secondary: MEDICARE / [employer]     # Auth Visits: POC every 10 visits            Subjective:  Completing HEP. Not wearing ankle brace at home; it bothers me. Deny any ankle instability without brace. DPM appointment moved to 12/12/2024 due to insurance auth issue.  C/o lateral R ankle pain when sitting, deny c/o other times, deny c/o with stairs, walking, standing.    Pain:  4/10        Objective:   Min edema R ankle, min - mod L ankle  Gait: pt able to ambulate in PT clinic for short distances without ankle orthosis, no assistive devices, ankles stable    Observation: R ankle brace donned.   Restrictions R ankle AROM, inversion most restricted    Assessment:   Pt progressing, reports ankle only hurts when sitting. Reviewed some ankle exercises in effort to reduce c/o when sitting; see below.  Pt able to walk short distances in PT clinic and at home (per pt) without c/o, without ankle instability.  Completed limited interventions without R ankle orthosis donned; pt tolerated well without ankle instability.. Advised ankle brace use, per DPM, including outdoors. Pt will benefit from limited number of sessions to address residual R ankle AROM, strength, proprioceptive, functional deficits. HEP reviewed. Tardy arrival limited session content.    Goals:    (to be met in 8-10 visits)  Patient to report independence with PT HEP to facilitate self management. - MET  Patient to have at least L/R ankle DF to at least neutral to facilitate toe clearance/normal gait mechanics. - PROGRESS  Patient to complete L/R SLS each at least 3 sec in order to reduce risk for ankle instability. - PROGRESS  Patient to walk/stand each at least 5 min unlimited by R ankle c/o. - PROGRESS  Patient to have R  ankle eversion MMT 5/5 in order to reduce risk for R ankle instability. - PROGRESS   Patient to reciprocally negotiate stairs unlimited by R ankle. - NEARLY MET    Plan: Continue PT 2 x weekly for 10-12 visits.  F/u on DPM appointment.  Progress  steps, R ankle AROM, strength, stability, proprioception, WB.    Date:  11/11/2024               TX#: 3/6-10 - Pt arrived ~ 15 min late.  Date:     11/13/2024            TX#: 4/6-10 - Pt arrived ~ 10 min late. Date:   11/18/2024            TX#: 5/6-10 Date: 11/25/2024  Tx#: 6/10-12 Date: 12/4/2024  Tx #: 7/10-12 - Pt arrived ~ 15 min late.    There Ex:   R ankle inversion and eversion using YTB x 20 reps each  Seated B heel/toe raises x 20 reps  Seated R ankle circles using round board x 10 reps each clockwise/counterclockwise  Standing L/R hip abd 2 x  10 reps    There Ex:   R ankle circles x 10 reps each clockwise/counterclockwise  R ankle inversion and eversion using YTB x 20 reps each  Standing B heel/toe raises 2 x 10 reps  Standing L/R gastroc stretch 2 x 20 sec   There Ex:   R ankle alphabet x 1   R ankle inversion and eversion using RTB x 20 reps each  Standing B heel/toe raises 2 x 10 reps  B gastroc stretch on slant board 2 x 30 sec each  4 inch step up x 5 reps R LE, up over down using R LE x 5 reps  There Ex:   R ankle alphabet x 1   R ankle inversion and eversion using RTB x 20 reps each  Seated hamstrings/gastroc stretch for c/o cramps  when sitting  Standing B heel/toe raises 2 x 10 reps (HEP)  B gastroc stretch on slant board 2 x 30 sec each  Mini staircase negotiation (4, ~ 6 inch steps) x 1   Seated R ankle AROM rockerboard taps PF/DF and inversion/eversion x 10 reps each, pt declined standing due to reporting increased ankle c/o  HEP review; see below.    There Ex:   HEP review; see below.   Gastroc stretch slant board 2 x 20 sec each   L/R SLS with contralateral hip flex-ext, abd x ~ 5 reps each (HEP alternative to marching, per pt inquiry)   Manual:    Grade II R talocrural ant/post glides   PROM R ankle PF, DF, inversion, eversion  R gastroc stretches 3 x 20 sec each  Manual:   Grade II R talocrural ant/post glides   PROM R ankle PF, DF, inversion, eversion  R gastroc stretches 3 x 20 sec each   R foot MTP/IP flex/ext PROM Manual:   Grade II R talocrural ant/post glides   PROM R ankle PF, DF, inversion, eversion  R gastroc stretches 3 x 20 sec each   R foot MTP/IP flex/ext PROM Manual:   Grade II R talocrural ant/post glides   PROM R ankle PF, DF, inversion, eversion  R gastroc stretches 3 x 20 sec each   R foot MTP/IP flex/ext PROM  Rhythmic stabilization R ankle x ~30 sec Manual:   Grade II R talocrural ant/post glides   PROM R ankle PF, DF, inversion, eversion  R gastroc stretches 3 x 20 sec each   R foot MTP/IP flex/ext PROM   NM RE:  Tandem balance x 20 sec each (HEP) NM RE:   AirEx: Alternate L/R LE marching 10 x 3 sec each NM RE:   Gait training: cuing for heel to toe gait pattern, R LE neutral (avoid excessive ER)  Heel to toe walking forward/backward 2 x ~ 16 feet   Marching (pt reports completing at home, advised to hold a few seconds): 5 x 5 sec   AirEx: Alternate L/R LE marching 10 x 3 sec each NM RE:  Heel to toe walking forward/backward 2 x ~ 16 feet    AirEx: Alternate L/R LE marching 10 x 3 sec each    TA: Brief review of computer ergonomics, per pt inquiry NM RE:  Rockerboard taps: ant/post and med/lat x ~ 15 reps each, balance x ~ 45 sec  Heel to toe walking forward/backward 3 x ~ 16 feet    AirEx: Alternate L/R LE marching 10 x 3 sec each       TA:   Stitting ankle exercises in effort to reduce c/o; may consider seated heel/toe raises, ankle circles, walk around, limit sitting time   HEP: ankle AROM PF/DF, inversion/eversion,  standing heel/toe raises,  standing gastroc stretch, tandem balance with/without arm sweeps at countertop, marching at countertop or SLS with contralateral hip flex/abd/ext    Charges: 1 TE ( 15 min), 0 manual (5 min),  1 NM RE (8 min), 0 TA (2 min) Total Timed Treatment:  30 min  Total Treatment Time: 35 min

## 2024-12-05 ENCOUNTER — APPOINTMENT (OUTPATIENT)
Dept: PHYSICAL THERAPY | Age: 74
End: 2024-12-05
Payer: MEDICARE

## 2024-12-18 ENCOUNTER — TELEPHONE (OUTPATIENT)
Dept: PHYSICAL THERAPY | Facility: HOSPITAL | Age: 74
End: 2024-12-18

## 2024-12-18 ENCOUNTER — TELEPHONE (OUTPATIENT)
Dept: PHYSICAL THERAPY | Age: 74
End: 2024-12-18

## 2024-12-18 NOTE — TELEPHONE ENCOUNTER
Pt on Wait List for PT appointments.  Called pt to offer her PT openings next week but advised that appointment times cannot be held for her, to call [phone] to determine if still available and to schedule. If not interested, no action necessary at this time. Confirmed 12/19 PT appointment.

## 2024-12-19 ENCOUNTER — APPOINTMENT (OUTPATIENT)
Dept: PHYSICAL THERAPY | Age: 74
End: 2024-12-19
Payer: MEDICARE

## 2024-12-19 ENCOUNTER — TELEPHONE (OUTPATIENT)
Dept: PHYSICAL THERAPY | Age: 74
End: 2024-12-19

## 2024-12-19 ENCOUNTER — TELEPHONE (OUTPATIENT)
Dept: PHYSICAL THERAPY | Facility: HOSPITAL | Age: 74
End: 2024-12-19

## 2024-12-19 NOTE — TELEPHONE ENCOUNTER
Called pt to confirm that I received her 12/12/2024 PT Referral. Advised that I have some openings the week of 12/23/2024, to call [phone] if interested in attending.  Confirmed that she is scheduled for PT 1/2025.

## 2024-12-19 NOTE — TELEPHONE ENCOUNTER
Called and left  pt voicemail re: no show to today's PT appointment, appointment openings available week of 12/23/2024, to call [phone]  if interested, stated that appointments cannot be held.

## 2024-12-23 ENCOUNTER — TELEPHONE (OUTPATIENT)
Dept: PHYSICAL THERAPY | Facility: HOSPITAL | Age: 74
End: 2024-12-23

## 2024-12-24 ENCOUNTER — APPOINTMENT (OUTPATIENT)
Dept: ULTRASOUND IMAGING | Facility: HOSPITAL | Age: 74
End: 2024-12-24
Attending: EMERGENCY MEDICINE
Payer: MEDICARE

## 2024-12-24 ENCOUNTER — APPOINTMENT (OUTPATIENT)
Dept: PHYSICAL THERAPY | Age: 74
End: 2024-12-24
Attending: PHYSICAL MEDICINE & REHABILITATION
Payer: MEDICARE

## 2024-12-24 ENCOUNTER — APPOINTMENT (OUTPATIENT)
Dept: GENERAL RADIOLOGY | Facility: HOSPITAL | Age: 74
End: 2024-12-24
Attending: EMERGENCY MEDICINE
Payer: MEDICARE

## 2024-12-24 ENCOUNTER — APPOINTMENT (OUTPATIENT)
Dept: CT IMAGING | Facility: HOSPITAL | Age: 74
End: 2024-12-24
Attending: EMERGENCY MEDICINE
Payer: MEDICARE

## 2024-12-24 ENCOUNTER — APPOINTMENT (OUTPATIENT)
Dept: MRI IMAGING | Facility: HOSPITAL | Age: 74
End: 2024-12-24
Attending: EMERGENCY MEDICINE
Payer: MEDICARE

## 2024-12-24 ENCOUNTER — HOSPITAL ENCOUNTER (INPATIENT)
Facility: HOSPITAL | Age: 74
LOS: 3 days | Discharge: HOME | End: 2024-12-27
Attending: EMERGENCY MEDICINE | Admitting: HOSPITALIST
Payer: MEDICARE

## 2024-12-24 ENCOUNTER — TELEPHONE (OUTPATIENT)
Dept: PHYSICAL THERAPY | Age: 74
End: 2024-12-24

## 2024-12-24 DIAGNOSIS — R82.81 PYURIA: ICD-10-CM

## 2024-12-24 DIAGNOSIS — Z92.29 HISTORY OF INFLUENZA VACCINATION: ICD-10-CM

## 2024-12-24 DIAGNOSIS — R29.6 RECURRENT FALLS: ICD-10-CM

## 2024-12-24 DIAGNOSIS — R53.1 WEAKNESS GENERALIZED: Primary | ICD-10-CM

## 2024-12-24 PROBLEM — R29.818 NEUROGENIC CLAUDICATION: Status: ACTIVE | Noted: 2024-12-24

## 2024-12-24 LAB
ALBUMIN SERPL-MCNC: 4.6 G/DL (ref 3.2–4.8)
ALP LIVER SERPL-CCNC: 68 U/L
ALT SERPL-CCNC: 33 U/L
ANION GAP SERPL CALC-SCNC: 6 MMOL/L (ref 0–18)
AST SERPL-CCNC: 53 U/L (ref ?–34)
BASOPHILS # BLD AUTO: 0.01 X10(3) UL (ref 0–0.2)
BASOPHILS NFR BLD AUTO: 0.1 %
BILIRUB DIRECT SERPL-MCNC: 0.3 MG/DL (ref ?–0.3)
BILIRUB SERPL-MCNC: 1.1 MG/DL (ref 0.2–1.1)
BILIRUB UR QL: NEGATIVE
BUN BLD-MCNC: 15 MG/DL (ref 9–23)
BUN/CREAT SERPL: 15.5 (ref 10–20)
CALCIUM BLD-MCNC: 9.9 MG/DL (ref 8.7–10.4)
CHLORIDE SERPL-SCNC: 102 MMOL/L (ref 98–112)
CK SERPL-CCNC: 293 U/L
CO2 SERPL-SCNC: 31 MMOL/L (ref 21–32)
COLOR UR: YELLOW
CREAT BLD-MCNC: 0.97 MG/DL
D DIMER PPP FEU-MCNC: 1.48 UG/ML FEU (ref ?–0.76)
DEPRECATED RDW RBC AUTO: 49.3 FL (ref 35.1–46.3)
EGFRCR SERPLBLD CKD-EPI 2021: 61 ML/MIN/1.73M2 (ref 60–?)
EOSINOPHIL # BLD AUTO: 0.02 X10(3) UL (ref 0–0.7)
EOSINOPHIL NFR BLD AUTO: 0.3 %
ERYTHROCYTE [DISTWIDTH] IN BLOOD BY AUTOMATED COUNT: 14.6 % (ref 11–15)
FLUAV + FLUBV RNA SPEC NAA+PROBE: NEGATIVE
FLUAV + FLUBV RNA SPEC NAA+PROBE: NEGATIVE
GLUCOSE BLD-MCNC: 99 MG/DL (ref 70–99)
GLUCOSE UR-MCNC: NORMAL MG/DL
HCT VFR BLD AUTO: 40.3 %
HGB BLD-MCNC: 13.3 G/DL
IMM GRANULOCYTES # BLD AUTO: 0.02 X10(3) UL (ref 0–1)
IMM GRANULOCYTES NFR BLD: 0.3 %
KETONES UR-MCNC: NEGATIVE MG/DL
LEUKOCYTE ESTERASE UR QL STRIP.AUTO: 250
LYMPHOCYTES # BLD AUTO: 1.02 X10(3) UL (ref 1–4)
LYMPHOCYTES NFR BLD AUTO: 13.5 %
MAGNESIUM SERPL-MCNC: 2.2 MG/DL (ref 1.6–2.6)
MCH RBC QN AUTO: 30.4 PG (ref 26–34)
MCHC RBC AUTO-ENTMCNC: 33 G/DL (ref 31–37)
MCV RBC AUTO: 92 FL
MONOCYTES # BLD AUTO: 0.61 X10(3) UL (ref 0.1–1)
MONOCYTES NFR BLD AUTO: 8.1 %
NEUTROPHILS # BLD AUTO: 5.86 X10 (3) UL (ref 1.5–7.7)
NEUTROPHILS # BLD AUTO: 5.86 X10(3) UL (ref 1.5–7.7)
NEUTROPHILS NFR BLD AUTO: 77.7 %
NT-PROBNP SERPL-MCNC: 109 PG/ML (ref ?–450)
OSMOLALITY SERPL CALC.SUM OF ELEC: 289 MOSM/KG (ref 275–295)
PH UR: 6.5 [PH] (ref 5–8)
PLATELET # BLD AUTO: 206 10(3)UL (ref 150–450)
POTASSIUM SERPL-SCNC: 4.8 MMOL/L (ref 3.5–5.1)
PROT SERPL-MCNC: 7.3 G/DL (ref 5.7–8.2)
PROT UR-MCNC: 30 MG/DL
RBC # BLD AUTO: 4.38 X10(6)UL
RBC #/AREA URNS AUTO: >10 /HPF
RSV RNA SPEC NAA+PROBE: NEGATIVE
SARS-COV-2 RNA RESP QL NAA+PROBE: NOT DETECTED
SODIUM SERPL-SCNC: 139 MMOL/L (ref 136–145)
SP GR UR STRIP: 1.02 (ref 1–1.03)
T3FREE SERPL-MCNC: 2.41 PG/ML (ref 2.4–4.2)
T4 FREE SERPL-MCNC: 1.1 NG/DL (ref 0.8–1.7)
TROPONIN I SERPL HS-MCNC: <3 NG/L
TSI SER-ACNC: 0.2 UIU/ML (ref 0.55–4.78)
UROBILINOGEN UR STRIP-ACNC: 2
WBC # BLD AUTO: 7.5 X10(3) UL (ref 4–11)

## 2024-12-24 PROCEDURE — 71260 CT THORAX DX C+: CPT | Performed by: EMERGENCY MEDICINE

## 2024-12-24 PROCEDURE — 93970 EXTREMITY STUDY: CPT | Performed by: EMERGENCY MEDICINE

## 2024-12-24 PROCEDURE — 99223 1ST HOSP IP/OBS HIGH 75: CPT | Performed by: HOSPITALIST

## 2024-12-24 PROCEDURE — 72131 CT LUMBAR SPINE W/O DYE: CPT | Performed by: EMERGENCY MEDICINE

## 2024-12-24 PROCEDURE — 73610 X-RAY EXAM OF ANKLE: CPT | Performed by: EMERGENCY MEDICINE

## 2024-12-24 PROCEDURE — 72170 X-RAY EXAM OF PELVIS: CPT | Performed by: EMERGENCY MEDICINE

## 2024-12-24 PROCEDURE — 71045 X-RAY EXAM CHEST 1 VIEW: CPT | Performed by: EMERGENCY MEDICINE

## 2024-12-24 RX ORDER — ACETAMINOPHEN 500 MG
500 TABLET ORAL EVERY 4 HOURS PRN
Status: DISCONTINUED | OUTPATIENT
Start: 2024-12-24 | End: 2024-12-27

## 2024-12-24 RX ORDER — METOCLOPRAMIDE HYDROCHLORIDE 5 MG/ML
5 INJECTION INTRAMUSCULAR; INTRAVENOUS EVERY 8 HOURS PRN
Status: DISCONTINUED | OUTPATIENT
Start: 2024-12-24 | End: 2024-12-27

## 2024-12-24 RX ORDER — METHYLPREDNISOLONE SODIUM SUCCINATE 40 MG/ML
40 INJECTION INTRAMUSCULAR; INTRAVENOUS EVERY 12 HOURS
Status: DISCONTINUED | OUTPATIENT
Start: 2024-12-24 | End: 2024-12-26

## 2024-12-24 RX ORDER — TRAMADOL HYDROCHLORIDE 50 MG/1
50 TABLET ORAL 4 TIMES DAILY PRN
Status: ON HOLD | COMMUNITY
End: 2024-12-27

## 2024-12-24 RX ORDER — ATORVASTATIN CALCIUM 40 MG/1
40 TABLET, FILM COATED ORAL NIGHTLY
COMMUNITY

## 2024-12-24 RX ORDER — MORPHINE SULFATE 2 MG/ML
1 INJECTION, SOLUTION INTRAMUSCULAR; INTRAVENOUS EVERY 2 HOUR PRN
Status: DISCONTINUED | OUTPATIENT
Start: 2024-12-24 | End: 2024-12-27

## 2024-12-24 RX ORDER — ASPIRIN 81 MG/1
162 TABLET ORAL DAILY
COMMUNITY

## 2024-12-24 RX ORDER — ONDANSETRON 2 MG/ML
4 INJECTION INTRAMUSCULAR; INTRAVENOUS EVERY 6 HOURS PRN
Status: DISCONTINUED | OUTPATIENT
Start: 2024-12-24 | End: 2024-12-27

## 2024-12-24 RX ORDER — TEMAZEPAM 15 MG/1
15 CAPSULE ORAL NIGHTLY PRN
Status: DISCONTINUED | OUTPATIENT
Start: 2024-12-24 | End: 2024-12-27

## 2024-12-24 RX ORDER — MORPHINE SULFATE 2 MG/ML
2 INJECTION, SOLUTION INTRAMUSCULAR; INTRAVENOUS EVERY 2 HOUR PRN
Status: DISCONTINUED | OUTPATIENT
Start: 2024-12-24 | End: 2024-12-27

## 2024-12-24 RX ORDER — CYCLOBENZAPRINE HCL 5 MG
10 TABLET ORAL 3 TIMES DAILY PRN
Status: DISCONTINUED | OUTPATIENT
Start: 2024-12-24 | End: 2024-12-27

## 2024-12-24 RX ORDER — MORPHINE SULFATE 4 MG/ML
4 INJECTION, SOLUTION INTRAMUSCULAR; INTRAVENOUS EVERY 2 HOUR PRN
Status: DISCONTINUED | OUTPATIENT
Start: 2024-12-24 | End: 2024-12-27

## 2024-12-24 RX ORDER — TRAMADOL HYDROCHLORIDE 50 MG/1
50 TABLET ORAL 4 TIMES DAILY PRN
Status: DISCONTINUED | OUTPATIENT
Start: 2024-12-24 | End: 2024-12-27

## 2024-12-24 NOTE — ED INITIAL ASSESSMENT (HPI)
Patient is here with bilateral leg weakness x2 days. Patient states falling out of bed 2 days ago. Patient denies losing consciousness. Patient adds that she was laying all night long on the floor because she couldn't get up. Denies being on blood thinners. Patient complains of left knee and right leg & ankle pain. Patient recently had right ankle brace removed after she sprained it.   Patient also adds that she had chest pain x 1 month. Patient states that she also has intermittent shortness breath.  Patient mentions that she has frequent urination x 2 weeks. Denies burning sensation of fevers.

## 2024-12-24 NOTE — ED PROVIDER NOTES
Patient Seen in: [facility] Emergency Department    History     Chief Complaint   Patient presents with    Chest Pain Angina    Fatigue    Leg or Foot Injury     Stated Complaint: Weakness     HPI    74-year-old female with past medical history of CAD, hypertension, spinal stenosis and with recent right ankle injury s/p brace removal two days ago presneting with family for evaluation of multiple complaints: months of vague CP/SOB without cough or upper back pain and without exertional/pleuritic complaints. Family noting generalized weakness moreso to BLE associated with urinary frequency without abdominal/flank pain and without back pain resulting in multiple falls    Past Medical History:    Back problem    Coronary atherosclerosis    Diverticulosis large intestine w/o perforation or abscess w/o bleeding    High cholesterol    History of heart artery stent    Hx of repair of rotator cuff    per ng bilateral    Internal hemorrhoids without complication    S/P colonoscopic polypectomy       Past Surgical History:   Procedure Laterality Date    Anesth,surgery of shoulder      Back surgery      Bso, omentectomy w/freedom  1985    partial     Colonoscopy N/A 2019    Procedure: COLONOSCOPY;  Surgeon: Nancy Redding MD;  Location: [facility] ENDOSCOPY    Foot surgery      Hysterectomy      Other surgical history      laminectomy    Other surgical history      removal tumor from right heel            Family History   Problem Relation Age of Onset    Cancer Father         pancreatic cancer    Diabetes Mother     Cancer Mother         Mother  from melanoma    Cancer Sister         pt.cannot recall type of cancer        Social History     Socioeconomic History    Marital status:    Tobacco Use    Smoking status: Never    Smokeless tobacco: Never   Vaping Use    Vaping status: Never Used   Substance and Sexual Activity    Alcohol use: Yes     Comment: rare    Drug use: No   Other Topics  Concern    Caffeine Concern Yes    Exercise Yes   Social History Narrative    The patient does not use an assistive device..      The patient does live in a home with stairs.     Social Drivers of Health     Food Insecurity: No Food Insecurity (5/9/2024)    Received from [facility]    Food Insecurity     Currently or in the past 3 months, have you worried your food would run out before you had money to buy more?: No     In the past 12 months, have you run out of food or been unable to get more?: No   Transportation Needs: No Transportation Needs (5/9/2024)    Received from [facility]    Transportation Needs     Currently or in the past 3 months, has lack of transportation kept you from medical appointments, getting food or medicine, or providing care to a family member?: Unrecognized value     Medical Transportation Needs?: No    Received from [facility]    Social Connections    Received from [facility]    Housing Stability       Review of Systems :  Constitutional: As per HPI  Respiratory: (+) dyspnea.  Cardiovascular: (+) chest pain.  Gastrointestinal: Negative for vomiting and abdominal pain.   Genitourinary: Negative for dysuria and hematuria. (+) urinary frequency.    Positive for stated complaint: Weakness  Other systems are as noted in HPI.  Constitutional and vital signs reviewed.      All other systems reviewed and negative except as noted above.    PSFH elements reviewed from today and agreed except as otherwise stated in HPI.    Physical Exam     ED Triage Vitals [12/24/24 1442]   /84   Pulse 85   Resp 20   Temp 98.9 °F (37.2 °C)   Temp src    SpO2 98 %   O2 Device        Current:/84   Pulse 85   Temp 98.9 °F (37.2 °C)   Resp 20   Ht 157.5 cm (5' 2\")   Wt 74.8 kg   SpO2 98%   BMI 30.18 kg/m²         Physical Exam   Constitutional: No distress.   HEENT: MMM.  Head: Normocephalic.   Eyes: No injection.    Cardiovascular: RRR. BLE with 2+ DP/PT pulses.  Pulmonary/Chest: Effort normal. CTAB.  Abdominal: Soft. Nontender.  Musculoskeletal: No gross deformity. No midline thoracolumbar tenderness/stepoff/deformity.  Neurological: Alert. BLE with 4+/5 strength proximally and distally and diminished patellar reflexes.  Skin: Skin is warm.   Psychiatric: Cooperative.  Nursing note and vitals reviewed.        ED Course     Labs Reviewed   CBC WITH DIFFERENTIAL WITH PLATELET - Abnormal; Notable for the following components:       Result Value    RDW-SD 49.3 (*)     All other components within normal limits   HEPATIC FUNCTION PANEL (7) - Abnormal; Notable for the following components:    AST 53 (*)     All other components within normal limits   URINALYSIS, ROUTINE - Abnormal; Notable for the following components:    Clarity Urine Turbid (*)     Blood Urine 2+ (*)     Protein Urine 30 (*)     Urobilinogen Urine 2 (*)     Nitrite Urine 1+ (*)     Leukocyte Esterase Urine 250 (*)     WBC Urine 21-50 (*)     RBC Urine >10 (*)     Bacteria Urine 1+ (*)     Squamous Epi. Cells Few (*)     Ca Oxalate Crystals Few (*)     All other components within normal limits   TSH W REFLEX TO FREE T4 - Abnormal; Notable for the following components:    TSH 0.201 (*)     All other components within normal limits   CK CREATINE KINASE (NOT CREATININE) - Abnormal; Notable for the following components:     (*)     All other components within normal limits   D-DIMER - Abnormal; Notable for the following components:    D-Dimer 1.48 (*)     All other components within normal limits   BASIC METABOLIC PANEL (8) - Normal   PRO BETA NATRIURETIC PEPTIDE - Normal   MAGNESIUM - Normal   T4, FREE (S) - Normal   FREE T3 (TRIIODOTHYRONINE) - Normal   TROPONIN I HIGH SENSITIVITY - Normal   SARS-COV-2/FLU A AND B/RSV BY PCR (GENEXPERT) - Normal    Narrative:     This test is intended for the qualitative detection and differentiation of SARS-CoV-2, influenza  A, influenza B, and respiratory syncytial virus (RSV) viral RNA in nasopharyngeal or nares swabs from individuals suspected of respiratory viral infection consistent with COVID-19 by their healthcare provider. Signs and symptoms of respiratory viral infection due to SARS-CoV-2, influenza, and RSV can be similar.    Test performed using the Xpert Xpress SARS-CoV-2/FLU/RSV (real time RT-PCR)  assay on the GeneXpert instrument, [employer], [place] [address].   This test is being used under the Food and Drug Administration's Emergency Use Authorization.    The authorized Fact Sheet for Healthcare Providers for this assay is available upon request from the laboratory.   BASIC METABOLIC PANEL (8)   CBC WITH DIFFERENTIAL WITH PLATELET   RAINBOW DRAW LAVENDER   RAINBOW DRAW LIGHT GREEN   RAINBOW DRAW BLUE   URINE CULTURE, ROUTINE     EKG    Rate, intervals and axes as noted on EKG Report.  Rate: 86  Rhythm: Sinus Rhythm  Reading: NSR 86 without CATALINA as independently interpreted by myself           US VENOUS DOPPLER LEG BILAT - DIAG IMG (CPT=93970)    Result Date: 12/24/2024  PROCEDURE: US VENOUS DOPPLER LEG BILAT-DIAG IMG (CPT=93970)  COMPARISON: None.  INDICATIONS: Weakness  TECHNIQUE: Color duplex Doppler venous ultrasound of both lower extremities was performed in the  usual manner.  FINDINGS:   The femoral and popliteal veins appear normal.  Normal flow was demonstrated with color and pulsed Doppler.  Visualized portions of the great and small saphenous, posterior tibial and peroneal veins appear normal.    THROMBI: None visible. COMPRESSIBILITY:   Normal. OTHER: Negative.         CONCLUSION:   No DVT in either lower extremity.     Dictated by (CST): Keegan Santana MD on 12/24/2024 at 8:47 PM     Finalized by (CST): Keegan Santana MD on 12/24/2024 at 8:48 PM          CT SPINE LUMBAR (CPT=72131)    Result Date: 12/24/2024  PROCEDURE: CT SPINE LUMBAR (CPT=72131)  COMPARISON: None.  INDICATIONS: Bilateral leg weakness.   TECHNIQUE:   Multi-planar CT images were obtained without intravenous contrast material.  Automated exposure control for dose reduction was used. Adjustment of the mA and/or kV was done based on the patient's size. Use of iterative reconstruction technique for dose reduction was used.  Dose information is transmitted to the ACR (American College of Radiology) NRDR (National Radiology Data Registry) which includes the Dose Index Registry.  Counting Reference: Lumbosacral junction.  For the purposes of this exam, it will be assumed that there are 5 lumbar-type vertebral bodies. L4-L5 is at the level of the iliac crest.  FINDINGS:  PARASPINAL AREA: Normal with no visible mass.  BONES:   There are hypertrophic changes of the lower lumbar facet joints. Small anterior endplate osteophytes seen within the lumbar vertebral bodies. There is no acute fracture or dislocation. There is osteopenia.  Fusion hardware seen throughout the entire lumbar spine extending from T12 through S1.  There has been posterior decompression seen throughout the entire lumbar spine.  There are surgical screws seen within the bilateral iliac bones.  Surgical hardware is grossly intact.  There is an interbody spacer at L5-S1 which is slightly eccentric and anteriorly subluxed. ALIGNMENT:   There has been straightening of the lumbar lordosis which could be secondary to positioning versus muscle spasm.  LUMBAR DISC LEVELS: L1-L2: Postoperative changes of pedicle screw and posterior fusion with posterior decompression.  There is extensive streak artifact which limits evaluation.  Hypertrophic changes seen involving the facet joints resulting in moderate narrowing of the bilateral neural foramen.  No high-grade canal narrowing given limitations from streak artifact. L2-L3: Postoperative changes of pedicle screw and posterior fusion with posterior decompression.  There is extensive streak artifact which limits evaluation.  Hypertrophic changes seen  involving the facet joints resulting in moderate narrowing of the bilateral neural foramen.  No high-grade canal narrowing given limitations from streak artifact. L3-L4: Postoperative changes of pedicle screw and posterior fusion with posterior decompression.  There is extensive streak artifact which limits evaluation.  Hypertrophic changes seen involving the facet joints resulting in moderate narrowing of the bilateral neural foramen.  No high-grade canal narrowing given limitations from streak artifact. L4-L5: Postoperative changes of pedicle screw and posterior fusion with posterior decompression.  There is extensive streak artifact which limits evaluation.  Hypertrophic changes seen involving the facet joints resulting in moderate narrowing of the bilateral neural foramen.  No high-grade canal narrowing given limitations from streak artifact. L5-S1: Postoperative changes of pedicle screw and posterior fusion with posterior decompression.  There is extensive streak artifact which limits evaluation.  Hypertrophic changes seen involving the facet joints resulting in moderate narrowing of the bilateral neural foramen.  No high-grade canal narrowing given limitations from streak artifact.  OTHER: Negative.          CONCLUSION:   Extensive pedicle screw and posterior fusion hardware seen throughout the entire lumbar spine.  Disc replacement at L5-S1 demonstrate slight anterior positioning which is nonspecific.  Correlation with prior imaging and operative report recommended.  Remainder of the surgical hardware is otherwise intact.  No high-grade canal or foraminal narrowing given limitations from streak artifact from patient's hardware.  No acute fracture or vertebral body height loss.     Dictated by (CST): Keegan Santana MD on 12/24/2024 at 7:53 PM     Finalized by (CST): Keegan Santana MD on 12/24/2024 at 7:57 PM          CT CHEST PE AORTA (IV ONLY) (CPT=71260)    Result Date: 12/24/2024  PROCEDURE: CT CHEST PE AORTA  (IV ONLY) (CPT=71260)  COMPARISON: None.  INDICATIONS: Intermittent chest pain, shortness of breath, generalized weakness x1 month.  TECHNIQUE: CT images of the chest were obtained with non-ionic intravenous contrast material.  Automated exposure control for dose reduction was used. Adjustment of the mA and/or kV was done based on the patient's size. Use of iterative reconstruction technique for dose reduction was used. Dose information is transmitted to the ACR (American College of Radiology) NRDR (National Radiology Data Registry) which includes the Dose Index Registry.  FINDINGS:  PULMONARY ARTERIES:   No pulmonary embolus in the central, main, lobar, segmental pulmonary arteries. Nondiagnostic in the subsegmental pulmonary arteries due to respiratory motion artifact.  LINES AND TUBES: None.  MEDIASTINUM/VASCULATURE: There are multiple mildly prominent mediastinal lymph nodes which are nonspecific and measure less than 1 cm in short axis. There is atherosclerotic calcification of the aortic arch and thoracic aorta. The thoracic aorta is otherwise unremarkable and not dilated. Mediastinal fat planes are preserved. Cardiac chambers are unremarkable. Pericardium is normal. There are coronary artery calcifications. The main pulmonary artery has a normal diameter and is otherwise unremarkable.  Markedly enlarged left thyroid goiter extending into the anterior prevascular space with rightward deviation of the trachea is again seen and unchanged since the prior exams.  LUNGS AND PLEURA: There is mild bronchial wall thickening suggestive of chronic airway inflammation. There is mosaic attenuation of the bilateral lungs which can be secondary to air trapping/small airways disease or small vessel disease. There is mild bibasilar atelectasis. No pneumothorax.  No consolidation.  No pleural effusion. The trachea and central airways are unremarkable.  There is biapical scarring and pleural thickening.  CHEST WALL/BONES: There  is degenerative disease of the thoracic spine. The chest wall and osseous structures are otherwise unremarkable.  Lower thoracic spine fusion hardware partially seen.  LIMITED ABDOMEN: Limited images of the upper abdomen are unremarkable.         CONCLUSION:   No pulmonary embolus in the central, main, lobar, segmental pulmonary arteries. Nondiagnostic in the subsegmental pulmonary arteries due to respiratory motion artifact.  Mosaic attenuation of the lungs bilaterally suggestive of air trapping which can be seen with small vessel or small airways disease.  Bronchial wall thickening suggestive of mild chronic airway inflammation.   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    Dictated by (CST): Keegan Santana MD on 12/24/2024 at 7:43 PM     Finalized by (CST): Keegan Santana MD on 12/24/2024 at 7:46 PM          XR ANKLE (MIN 3 VIEWS), RIGHT (CPT=73610)    Result Date: 12/24/2024  PROCEDURE: XR ANKLE (MIN 3 VIEWS), RIGHT (CPT=73610)  COMPARISON: [facility], MRI FOOT WO CONTRAST RT, 9/20/2015, 10:56 AM.  [facility], X FOOT RT, 8/17/2015, 8:16 AM.  INDICATIONS: Generalized weakness.  TECHNIQUE: 3 views were obtained.   FINDINGS:  BONES: There are partially imaged postoperative changes again noted from a remote bunionectomy and 1st metatarsal osteotomy.  There are stable sclerotic lesions in the calcaneal body.  There is no fracture or dislocation.  The mortise is intact.  There is stable mild dorsal spurring at the talonavicular joint. SOFT TISSUES: Negative. No visible soft tissue swelling. EFFUSION: None visible. OTHER: Negative.         CONCLUSION:  1. No fracture or dislocation. 2. Partially imaged postoperative changes related to a remote bunionectomy. 3. Stable mild talonavicular joint osteoarthritis. 4. Sclerotic lesions in the calcaneal body are unchanged from 2015 consistent with benign/nonaggressive lesions such as  bone islands.    Dictated by (CST): Alfonzo Zheng MD on 12/24/2024 at 4:35 PM     Finalized by (CST): Alfonzo Zheng MD on 12/24/2024 at 4:37 PM          XR PELVIS (1 VIEW) (CPT=72170)    Result Date: 12/24/2024  PROCEDURE: XR PELVIS (1 VIEW) (CPT=72170)  COMPARISON: [facility], XR LUMBAR SPINE (MIN 4 VIEWS) (CPT=72110), 5/01/2017, 8:47 AM.  INDICATIONS: Generalized weakness, left hip pain.  TECHNIQUE: 1 AP view was obtained.   FINDINGS:  BONES: There is no fracture or dislocation.  Partially imaged postoperative changes are noted from an interval multilevel posterior instrumented lumbar spinal fusion/laminectomy with an interbody fusion at L5-S1 and bilateral sacroiliac joint fusion.  Mild joint space narrowing and marginal osteophyte formation involving both hip joints is consistent with mild osteoarthritis.  The pubic symphysis and sacroiliac joints are intact.  There is mild enthesopathy along the iliac crests, ischial tuberosities  and greater trochanters bilaterally. SOFT TISSUES: Pelvic phleboliths are seen bilaterally. No visible soft tissue swelling. EFFUSION: None visible. OTHER: Negative.         CONCLUSION:  1. No fracture or dislocation. 2. Mild bilateral hip joint osteoarthritis. 3. Partially imaged postoperative changes related to a multilevel lumbar laminectomy and posterior instrumented fusion with interbody fusion at L5-S1 and bilateral sacroiliac joint fusion.    Dictated by (CST): Alfonzo Zheng MD on 12/24/2024 at 4:33 PM     Finalized by (CST): Alfonzo Zheng MD on 12/24/2024 at 4:34 PM          XR CHEST AP PORTABLE  (CPT=71045)    Result Date: 12/24/2024  PROCEDURE: XR CHEST AP PORTABLE  (CPT=71045) TIME: 16:05.   COMPARISON: [facility], XR LUMBAR SPINE (MIN 4 VIEWS) (CPT=72110), 5/01/2017, 8:47 AM.  [facility], XR CHEST PA + LAT CHEST (UQB=52163), 5/20/2019, 2:01 PM.  INDICATIONS: Generalized weakness.   History of coronary atherosclerosis.  TECHNIQUE:   Single view.   FINDINGS:  CARDIAC/VASC: The cardiac silhouette is not enlarged.  There is stable tortuosity noted to the thoracic aorta.  Unremarkable pulmonary vasculature.  MEDIAST/JORI:   No visible mass or adenopathy. LUNGS/PLEURA: Normal.  No significant pulmonary parenchymal abnormalities.  No effusion or pleural thickening. BONES: Postoperative changes are again noted from a previous right rotator cuff repair.  Partially imaged postoperative changes are also seen from an interval posterior instrumented lumbar spinal fusion. OTHER: Negative.          CONCLUSION:  No acute cardiopulmonary process.    Dictated by (CST): Alfonzo Zheng MD on 12/24/2024 at 4:32 PM     Finalized by (CST): Alfonzo Zheng MD on 12/24/2024 at 4:33 PM           ED Course as of 12/24/24 2103  ------------------------------------------------------------  Time: 12/24 1616  Comment: Strength seeming improved without intervention.  ------------------------------------------------------------  Time: 12/24 1820  Comment: MRI tech calling -  currently without working MRI with metallic suppression for next several days in setting of prior spinal surgeries, unable to obtain MRI.  ------------------------------------------------------------  Time: 12/24 2102  Comment: Noted to be ambulatory with assistance.       MDM   DIFFERENTIAL DIAGNOSIS: After history and physical exam differential diagnosis includes but is not limited to electrolyte derangement, PNA, rhabdomyolysis, myopathy, thyroid derangement, central cord pathology, GBS.    Pulse ox: 98%:Normal on RA, as independently interpreted by myself    Cardiac Monitor Interpretation:   Pulse Readings from Last 1 Encounters:   12/24/24 85   , sinus,      Medical Decision Making  Evaluation for generalized weakness now with recurrent falls in setting of influenza vaccination three weeks ago with vague/subacute CP/SOB; EKG/CXR/troponin  nonacute with dimer elevated for which CT/US obtained and without acute findings; radiography nonacute with lumbar MRI unable to be obtained and CT as noted; UA noted for which culture sent and antibiotics empirically initiated; presentation somewhat concerning for GBS in setting of history preceded by influenza vaccination in setting of reported inability to transfer though noted to be ambulatory with assistance; will admit for ongoing monitoring/management including neurologic evaluation - case d/w on call medicine Dr. Abraham for admission and neurology Dr. Les Baig in consultation.    Problems Addressed:  History of influenza vaccination: chronic illness or injury  Pyuria: acute illness or injury  Recurrent falls: acute illness or injury  Weakness generalized: acute illness or injury    Amount and/or Complexity of Data Reviewed  Independent Historian: caregiver     Details: Daughter at bedside for collateral history  External Data Reviewed: notes.     Details: 11/21/2024 OSH PCP note  Labs: ordered. Decision-making details documented in ED Course.  Radiology: ordered and independent interpretation performed. Decision-making details documented in ED Course.     Details: CXR without obvious pneumothorax as independently interpreted by myself    ECG/medicine tests: ordered and independent interpretation performed. Decision-making details documented in ED Course.  Discussion of management or test interpretation with external provider(s): Case d/w on call medicine Dr. Abraham for admission and neurology Dr. Les Baig in consultation.    Risk  Prescription drug management.  Decision regarding hospitalization.      I was wearing at minimum a facemask and eye protection throughout this encounter with handwashing performed prior and after patient evaluation without personal hand/facial/oropharyngeal contact and gloves worn throughout encounter. See note and/or contact this provider for further PPE  details.    Disposition and Plan     Clinical Impression:  1. Weakness generalized    2. Recurrent falls    3. History of influenza vaccination    4. Pyuria        Disposition:  Admit    Follow-up:  No follow-up provider specified.    Medications Prescribed:  Current Discharge Medication List

## 2024-12-24 NOTE — ED QUICK NOTES
Orders for admission, patient is aware of plan and ready to go upstairs. Any questions, please call ED RN Magdalena at extension [phone].     Patient Covid vaccination status: Fully vaccinated     COVID Test Ordered in ED: SARS-CoV-2/Flu A and B/RSV by PCR (GeneXpert)    COVID Suspicion at Admission: N/A    Running Infusions:  None    Mental Status/LOC at time of transport: A&Ox4    Other pertinent information:   CIWA score: N/A   NIH score:  N/A

## 2024-12-24 NOTE — TELEPHONE ENCOUNTER
Patient called to cancel today's PT appointment, c/o LE weakness/giving out, fell yesterday, experiencing flu like symptoms.  Advised pt to contact PCP and/or go to Emergency Room. Pt expressed understanding.

## 2024-12-25 LAB
ANION GAP SERPL CALC-SCNC: 8 MMOL/L (ref 0–18)
BASOPHILS # BLD AUTO: 0.01 X10(3) UL (ref 0–0.2)
BASOPHILS NFR BLD AUTO: 0.2 %
BUN BLD-MCNC: 15 MG/DL (ref 9–23)
BUN/CREAT SERPL: 15 (ref 10–20)
CALCIUM BLD-MCNC: 10.1 MG/DL (ref 8.7–10.4)
CHLORIDE SERPL-SCNC: 101 MMOL/L (ref 98–112)
CO2 SERPL-SCNC: 30 MMOL/L (ref 21–32)
CREAT BLD-MCNC: 1 MG/DL
DEPRECATED RDW RBC AUTO: 48.4 FL (ref 35.1–46.3)
EGFRCR SERPLBLD CKD-EPI 2021: 58 ML/MIN/1.73M2 (ref 60–?)
EOSINOPHIL # BLD AUTO: 0 X10(3) UL (ref 0–0.7)
EOSINOPHIL NFR BLD AUTO: 0 %
ERYTHROCYTE [DISTWIDTH] IN BLOOD BY AUTOMATED COUNT: 14.2 % (ref 11–15)
GLUCOSE BLD-MCNC: 180 MG/DL (ref 70–99)
HCT VFR BLD AUTO: 39.7 %
HGB BLD-MCNC: 13.6 G/DL
IMM GRANULOCYTES # BLD AUTO: 0.01 X10(3) UL (ref 0–1)
IMM GRANULOCYTES NFR BLD: 0.2 %
LYMPHOCYTES # BLD AUTO: 1.16 X10(3) UL (ref 1–4)
LYMPHOCYTES NFR BLD AUTO: 18.6 %
MCH RBC QN AUTO: 31.5 PG (ref 26–34)
MCHC RBC AUTO-ENTMCNC: 34.3 G/DL (ref 31–37)
MCV RBC AUTO: 91.9 FL
MONOCYTES # BLD AUTO: 0.17 X10(3) UL (ref 0.1–1)
MONOCYTES NFR BLD AUTO: 2.7 %
NEUTROPHILS # BLD AUTO: 4.87 X10 (3) UL (ref 1.5–7.7)
NEUTROPHILS # BLD AUTO: 4.87 X10(3) UL (ref 1.5–7.7)
NEUTROPHILS NFR BLD AUTO: 78.3 %
OSMOLALITY SERPL CALC.SUM OF ELEC: 293 MOSM/KG (ref 275–295)
PLATELET # BLD AUTO: 198 10(3)UL (ref 150–450)
POTASSIUM SERPL-SCNC: 3.9 MMOL/L (ref 3.5–5.1)
RBC # BLD AUTO: 4.32 X10(6)UL
SODIUM SERPL-SCNC: 139 MMOL/L (ref 136–145)
WBC # BLD AUTO: 6.2 X10(3) UL (ref 4–11)

## 2024-12-25 PROCEDURE — 99233 SBSQ HOSP IP/OBS HIGH 50: CPT | Performed by: INTERNAL MEDICINE

## 2024-12-25 PROCEDURE — 99223 1ST HOSP IP/OBS HIGH 75: CPT | Performed by: OTHER

## 2024-12-25 RX ORDER — HEPARIN SODIUM 5000 [USP'U]/ML
5000 INJECTION, SOLUTION INTRAVENOUS; SUBCUTANEOUS EVERY 8 HOURS SCHEDULED
Status: DISCONTINUED | OUTPATIENT
Start: 2024-12-25 | End: 2024-12-27

## 2024-12-25 RX ORDER — ATORVASTATIN CALCIUM 40 MG/1
40 TABLET, FILM COATED ORAL NIGHTLY
Status: DISCONTINUED | OUTPATIENT
Start: 2024-12-25 | End: 2024-12-27

## 2024-12-25 NOTE — PLAN OF CARE
Problem: Patient Centered Care  Goal: Patient preferences are identified and integrated in the patient's plan of care  Description: Interventions:  - What would you like us to know as we care for you? I am from home with my daughter  - Provide timely, complete, and accurate information to patient/family  - Incorporate patient and family knowledge, values, beliefs, and cultural backgrounds into the planning and delivery of care  - Encourage patient/family to participate in care and decision-making at the level they choose  - Honor patient and family perspectives and choices  Outcome: Progressing     Problem: Patient/Family Goals  Goal: Patient/Family Long Term Goal  Description: Patient's Long Term Goal: Discharge    Interventions:  - Monitor vitals  - Monitor appropriate labs  - Administer medications as ordered  - Follow MD's orders  - Update patient on plan of care   - Discharge planning     - See additional Care Plan goals for specific interventions  Outcome: Progressing  Goal: Patient/Family Short Term Goal  Description: Patient's Short Term Goal:     Interventions:   -   - See additional Care Plan goals for specific interventions  Outcome: Progressing     Problem: Impaired Functional Mobility  Goal: Achieve highest/safest level of mobility/gait  Description: Interventions:  - Assess patient's functional ability and stability  - Promote increasing activity/tolerance for mobility and gait  - Educate and engage patient/family in tolerated activity level and precautions    Outcome: Progressing     Problem: PAIN - ADULT  Goal: Verbalizes/displays adequate comfort level or patient's stated pain goal  Description: INTERVENTIONS:  - Encourage pt to monitor pain and request assistance  - Assess pain using appropriate pain scale  - Administer analgesics based on type and severity of pain and evaluate response  - Implement non-pharmacological measures as appropriate and evaluate response  - Consider cultural and social  influences on pain and pain management  - Manage/alleviate anxiety  - Utilize distraction and/or relaxation techniques  - Monitor for opioid side effects  - Notify MD/LIP if interventions unsuccessful or patient reports new pain  - Anticipate increased pain with activity and pre-medicate as appropriate  Outcome: Progressing     Problem: RISK FOR INFECTION - ADULT  Goal: Absence of fever/infection during anticipated neutropenic period  Description: INTERVENTIONS  - Monitor WBC  - Administer growth factors as ordered  - Implement neutropenic guidelines  Outcome: Progressing     Problem: SAFETY ADULT - FALL  Goal: Free from fall injury  Description: INTERVENTIONS:  - Assess pt frequently for physical needs  - Identify cognitive and physical deficits and behaviors that affect risk of falls.  - [place] fall precautions as indicated by assessment.  - Educate pt/family on patient safety including physical limitations  - Instruct pt to call for assistance with activity based on assessment  - Modify environment to reduce risk of injury  - Provide assistive devices as appropriate  - Consider OT/PT consult to assist with strengthening/mobility  - Encourage toileting schedule  Outcome: Progressing

## 2024-12-25 NOTE — H&P
[facility]    PATIENT'S NAME: MÓNICA NAJERA   ATTENDING PHYSICIAN: Anurag Mathews MD   PATIENT ACCOUNT#:   [id number]    LOCATION:  [address]  MEDICAL RECORD #:   [id number]       YOB: 1950  ADMISSION DATE:       12/24/2024    HISTORY AND PHYSICAL EXAMINATION    CHIEF COMPLAINT:  Intractable lumbar radiculopathy and neurogenic claudication, multiples falls.    HISTORY OF PRESENT ILLNESS:  Patient is a 74-year-old  female who came into the emergency department for evaluation of progressive leg weakness, tingling, and numbness bilaterally.  Urinalysis showed possible urinary tract infection.  D-dimer is still pending.  Otherwise, CBC and chemistry were unremarkable.  X-ray of the right ankle showed no acute fracture or dislocation.  X-ray of the pelvis showed no fracture.  Chest x-ray, no acute findings.  MRI scan of the lumbar spine is still pending.    PAST MEDICAL HISTORY:  Coronary artery disease status post LAD, PCI stent, degenerative joint disease of the lumbar spine, peripheral neuropathy, hyperlipidemia, osteoarthritis, diverticulosis.     PAST SURGICAL HISTORY:  Bilateral shoulder rotator cuff repair, lumbar laminectomy, total abdominal hysterectomy, bilateral salpingo-oophorectomy, and right ankle benign tumor resection.    MEDICATIONS:  Please see the medication reconciliation list.  Patient said she stopped taking gabapentin because it does not do anything for her.  She is currently on tramadol for pain control.    ALLERGIES:  No known drug allergies.    FAMILY HISTORY:  Mother had diabetes mellitus type 2, melanoma.  Father had pancreatic cancer.    SOCIAL HISTORY:  No tobacco or drug use.  Social alcohol.  Independent in baseline basic activities of daily living, but recently because of her back pain and bilateral lower extremity weakness, she has been having difficulty with her ambulation and with multiple falls.    REVIEW OF SYSTEMS:  Patient does  have chronic back pain and a known underlying degenerative joint disease of the lumbar spine.  She had sustained a recent fall in August 2024 that required a right ankle brace for soft tissue injury.  Patient said that she has been walking on the brace without problems.  Around 3 days ago, her brace was removed, and 2 nights ago, she woke up and went to the bathroom.  She has been experiencing back pain and then her legs gave out and she fell.  Since then, her back pain has become exacerbated and her legs' tingling and numbness have been spreading from the bilateral feet toward the waist in an upward fashion.  Patient said that her legs give out, and she is not able to ambulate.  She had multiple falls at home.  Other 12-point review of systems is negative.      PHYSICAL EXAMINATION:    GENERAL:  Alert, oriented to time, place, and person.  Moderate distress.  VITAL SIGNS:  Temperature 98.9, pulse 85, respiratory rate 20, blood pressure 128/84, pulse ox 98% on room air.  HEENT:  Atraumatic.  Oropharynx clear.  Moist mucous membranes.  Ears, Nose:  Normal.  Eyes:  Anicteric sclerae.  NECK:  Supple.  No lymphadenopathy.  Trachea midline.  Full range of motion.  LUNGS:  Clear to auscultation bilaterally.  Normal respiratory effort.  HEART:  Regular rate and rhythm.  S1, S2 auscultated.  No murmur.  ABDOMEN:  Soft, nondistended.  No tenderness.  Positive bowel sounds.  EXTREMITIES:  No peripheral edema, clubbing, or cyanosis.  NEUROLOGIC:  Slight weakness noted on both lower extremities and decreased sensation to light touch.  Also decreased quadriceps reflexes bilaterally.    ASSESSMENT:  Lumbar radiculopathy with possible neurogenic claudication and multiple falls.  Rule out Guillain-Rudd syndrome.  Patient received the flu vaccine 3 weeks ago.  She reports tingling and numbness that started in her toes bilaterally and now spreading upwards to her waist.    PLAN:  We will obtain Neurology consult.  Follow up on MRI  scan of the lumbar spine.  Fall precautions.  Physical therapy.  IV Solu-Medrol.  Pain control.  Further recommendations to follow.    Dictated By Fawad Abraham MD  d: 12/24/2024 17:27:07  t: 12/24/2024 17:47:01  Job [id number]  FB/

## 2024-12-25 NOTE — CONSULTS
[facility]  [address] [place] [address], [address]  [facility] [address]  [phone]            Melisa Agosto Patient Status:  Observation    1950 MRN [id number]   Location [facility] 5SW/SE Attending Adrien Guerra MD   Hosp Day # 0 PCP No primary care provider on file.     Date of Admission:  2024  Date of Consult:  2024  Reason for Consultation:   Generalized weakness    History of Present Illness:   Patient is a 74 year old female who was admitted to the hospital for Weakness generalized:    I have been consulted for further evaluation and management of her weakness.  History was obtained from the patient herself as well as from the medical record.    She told me that her lower extremities got extremely weak over the past 2 days and she is no longer able to ambulate without a walker.  She had noticed some minimal weakness in her lower extremities over the past 2 weeks but not as severe.  She also noticed that a couple of weeks ago she started developing urinary incontinence.  She said she would feel the urge but is unable to stop the urine, she had wet herself few times which is unusual for her    She denies any weakness in her upper extremities, denies any tingling or numbness in upper extremities.  She endorses numbness in her feet, not necessarily loss of sensation and said that when her feet touch the ground she would feel it normally    She was wearing a brace in her right ankle due to ankle injury and when the brace was removed a couple of days ago she noticed her balance is not the same and she indeed sustained a fall due to her legs giving out.     Her past medical history significant for lumbar spine surgery    Past Medical History  Past Medical History:    Back problem    Coronary atherosclerosis    Diverticulosis large intestine w/o perforation or abscess w/o bleeding    High cholesterol    History of heart artery stent    Hx of repair of  rotator cuff    per ng bilateral    Internal hemorrhoids without complication    S/P colonoscopic polypectomy       Past Surgical History  Past Surgical History:   Procedure Laterality Date    Anesth,surgery of shoulder      Back surgery      Bso, omentectomy w/freedom  1985    partial     Colonoscopy N/A 2019    Procedure: COLONOSCOPY;  Surgeon: Nancy Redding MD;  Location: [facility] ENDOSCOPY    Foot surgery      Hysterectomy      Other surgical history      laminectomy    Other surgical history      removal tumor from right heel       Family History  Family History   Problem Relation Age of Onset    Cancer Father         pancreatic cancer    Diabetes Mother     Cancer Mother         Mother  from melanoma    Cancer Sister         pt.cannot recall type of cancer        Social History  Pediatric History   Patient Parents    Not on file     Other Topics Concern    Caffeine Concern Yes    Exercise Yes    Seat Belt Not Asked    Special Diet Not Asked    Stress Concern Not Asked    Weight Concern Not Asked     Service Not Asked    Blood Transfusions Not Asked    Occupational Exposure Not Asked    Hobby Hazards Not Asked    Sleep Concern Not Asked    Back Care Not Asked    Bike Helmet Not Asked    Self-Exams Not Asked   Social History Narrative    The patient does not use an assistive device..      The patient does live in a home with stairs.           Current Medications:  Current Facility-Administered Medications   Medication Dose Route Frequency    ceFAZolin (Ancef) 2g in 10mL IV syringe premix  2 g Intravenous Q8H    atorvastatin (Lipitor) tab 40 mg  40 mg Oral Nightly    acetaminophen (Tylenol Extra Strength) tab 500 mg  500 mg Oral Q4H PRN    ondansetron (Zofran) 4 MG/2ML injection 4 mg  4 mg Intravenous Q6H PRN    metoclopramide (Reglan) 5 mg/mL injection 5 mg  5 mg Intravenous Q8H PRN    temazepam (Restoril) cap 15 mg  15 mg Oral Nightly PRN    morphINE PF 2 MG/ML injection 1  mg  1 mg Intravenous Q2H PRN    Or    morphINE PF 2 MG/ML injection 2 mg  2 mg Intravenous Q2H PRN    Or    morphINE PF 4 MG/ML injection 4 mg  4 mg Intravenous Q2H PRN    methylPREDNISolone sodium succinate (Solu-MEDROL) injection 40 mg  40 mg Intravenous Q12H    traMADol (Ultram) tab 50 mg  50 mg Oral QID PRN    cyclobenzaprine (Flexeril) tab 10 mg  10 mg Oral TID PRN     Medications Prior to Admission   Medication Sig    aspirin 81 MG Oral Tab EC Take 2 tablets (162 mg total) by mouth daily.    atorvastatin 40 MG Oral Tab Take 1 tablet (40 mg total) by mouth nightly. Avoid grapefruit juice.    traMADol 50 MG Oral Tab Take 1 tablet (50 mg total) by mouth 4 (four) times daily as needed.    Multiple Vitamin (ONE-DAILY MULTI VITAMINS) Oral Tab Take 1 tablet by mouth daily.    ibuprofen 200 MG Oral Tab Take 3 tablets (600 mg total) by mouth daily as needed for Pain.       Allergies  Allergies[1]    Review of Systems:   As in HPI, the rest of the 14 system review was done and was negative    Physical Exam:     Vitals:    12/24/24 2137 12/24/24 2206 12/25/24 0457 12/25/24 0919   BP: 131/69 126/70 106/63 121/68   Pulse: 81 78 66 80   Resp: 14 16 14 14   Temp:  99.8 °F (37.7 °C) 97.6 °F (36.4 °C) 97.6 °F (36.4 °C)   TempSrc:  Oral Oral Oral   SpO2: 98% 98% 98% 97%   Weight:  168 lb 12.8 oz (76.6 kg)     Height:  62\"         General: No apparent distress, well nourished, well groomed.      Neurological:     Mental Status- Alert and oriented x3.  Normal attention span and concentration      Language intact including: comprehension, naming, repetition, vocabulary    Cranial Nerves:  II.- Visual fields full to confrontation  III, IV, VI- EOM intact, BEHT  V. Facial sensation intact  VII. Face symmetric, no facial weakness  VIII. Hearing intact.  IX. Palate elevates symmetrically.  XI. Shoulder shrug is intact  XII. Tongue is midline    Motor Exam:    Strength- upper extremities 5/5 proximally and distally  She is weak in  bilateral lower extremities worse in her right lower extremity.  She was able to lift her right lower extremity off the bed against gravity but only for few inches then her leg fell right back to bed.  She was able to lift her left lower extremity and sustain it for few seconds off the bed, motor power was 3/5 in proximal muscle of her right lower extremity and 4/5 in proximal muscle of her left lower extremity, 4/5 in distal muscle of the right lower extremity and 5/5 in distal muscle of left lower extremity    Sensory Exam:  Light touch sensation- intact in all 4 extremities    Deep Tendon Reflexes:  2+ and symmetric in upper extremities, absent in lower extremities, upgoing toe on the right  Few beats of ankle clonus on the right, no ankle clonus on the left, mildly spastic in bilateral lower extremities    Coordination:  Finger to nose intact      Results:     Laboratory Data:  Lab Results   Component Value Date    WBC 6.2 12/25/2024    HGB 13.6 12/25/2024    HCT 39.7 12/25/2024    .0 12/25/2024    CREATSERUM 1.00 12/25/2024    BUN 15 12/25/2024     12/25/2024    K 3.9 12/25/2024     12/25/2024    CO2 30.0 12/25/2024     (H) 12/25/2024    CA 10.1 12/25/2024    ALB 4.6 12/24/2024    ALKPHO 68 12/24/2024    TP 7.3 12/24/2024    AST 53 (H) 12/24/2024    ALT 33 12/24/2024    PTT 40.5 (H) 09/12/2017    INR 1.1 09/12/2017    PTP 13.5 09/12/2017    T4F 1.1 12/24/2024    TSH 0.201 (L) 12/24/2024    DDIMER 1.48 (H) 12/24/2024    MG 2.2 12/24/2024    TROP 0.00 09/12/2017     (H) 12/24/2024         Imaging:    US VENOUS DOPPLER LEG BILAT - DIAG IMG (CPT=93970)    Result Date: 12/24/2024  CONCLUSION:   No DVT in either lower extremity.     Dictated by (CST): Keegan Santana MD on 12/24/2024 at 8:47 PM     Finalized by (CST): Keegan Santana MD on 12/24/2024 at 8:48 PM          CT SPINE LUMBAR (CPT=72131)    Result Date: 12/24/2024  CONCLUSION:   Extensive pedicle screw and posterior fusion hardware  seen throughout the entire lumbar spine.  Disc replacement at L5-S1 demonstrate slight anterior positioning which is nonspecific.  Correlation with prior imaging and operative report recommended.  Remainder of the surgical hardware is otherwise intact.  No high-grade canal or foraminal narrowing given limitations from streak artifact from patient's hardware.  No acute fracture or vertebral body height loss.     Dictated by (CST): Keegan Santana MD on 12/24/2024 at 7:53 PM     Finalized by (CST): Keegan Santana MD on 12/24/2024 at 7:57 PM          CT CHEST PE AORTA (IV ONLY) (CPT=71260)    Result Date: 12/24/2024  CONCLUSION:   No pulmonary embolus in the central, main, lobar, segmental pulmonary arteries. Nondiagnostic in the subsegmental pulmonary arteries due to respiratory motion artifact.  Mosaic attenuation of the lungs bilaterally suggestive of air trapping which can be seen with small vessel or small airways disease.  Bronchial wall thickening suggestive of mild chronic airway inflammation.   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    Dictated by (CST): Keegan Santana MD on 12/24/2024 at 7:43 PM     Finalized by (CST): Keegan Santana MD on 12/24/2024 at 7:46 PM          XR ANKLE (MIN 3 VIEWS), RIGHT (CPT=73610)    Result Date: 12/24/2024  CONCLUSION:  1. No fracture or dislocation. 2. Partially imaged postoperative changes related to a remote bunionectomy. 3. Stable mild talonavicular joint osteoarthritis. 4. Sclerotic lesions in the calcaneal body are unchanged from 2015 consistent with benign/nonaggressive lesions such as bone islands.    Dictated by (CST): Alfonzo Zheng MD on 12/24/2024 at 4:35 PM     Finalized by (CST): Alfonzo Zheng MD on 12/24/2024 at 4:37 PM          XR PELVIS (1 VIEW) (CPT=72170)    Result Date: 12/24/2024  CONCLUSION:  1. No fracture or dislocation. 2. Mild bilateral hip joint osteoarthritis. 3. Partially imaged postoperative  changes related to a multilevel lumbar laminectomy and posterior instrumented fusion with interbody fusion at L5-S1 and bilateral sacroiliac joint fusion.    Dictated by (CST): Alfonzo Zheng MD on 12/24/2024 at 4:33 PM     Finalized by (CST): Alfonzo Zheng MD on 12/24/2024 at 4:34 PM          XR CHEST AP PORTABLE  (CPT=71045)    Result Date: 12/24/2024  CONCLUSION:  No acute cardiopulmonary process.    Dictated by (CST): Alfonzo Zheng MD on 12/24/2024 at 4:32 PM     Finalized by (CST): Alfonzo Zheng MD on 12/24/2024 at 4:33 PM         EKG 12 Lead    Result Date: 12/24/2024  Normal sinus rhythm Normal ECG No previous ECGs found in [facility]       Impression:   Bilateral lower extremity weakness  I am suspecting myelopathy, she is mildly spastic in bilateral lower extremities, has spontaneously upgoing toe on the right.  Endorses urinary urgency and incontinence over the past 2 weeks      Recommendations:  1-MRI cervical thoracic and lumbar spine      We are following  Thank you for allowing me to participate in the care of your patient.    Gonzalo Deras MD              [1]   Allergies  Allergen Reactions    Vicodin [Hydrocodone-Acetaminophen] ITCHING     Other reaction(s): Pruritus

## 2024-12-25 NOTE — PROGRESS NOTES
[facility]  part of [facility]ist Progress Note     Melisa Agosto Patient Status:  Observation    1950 MRN [id number]   Location [facility] 5SW/SE Attending Adrien Guerra MD   Hosp Day # 0 PCP No primary care provider on file.     Chief Complaint:   Chief Complaint   Patient presents with    Chest Pain Angina    Fatigue    Leg or Foot Injury        Subjective:     Patient seen lying in bed.  No family at bedside.  Patient reports still being some back pain, but improving with pain medications.  Patient reports lower extremity numbness and tingling improving.    Objective:      Vital signs:  Vitals:    24 2137 24 2206 24 0457 24 0919   BP: 131/69 126/70 106/63 121/68   BP Location:  Right arm Right arm Right arm   Pulse: 81 78 66 80   Resp: 14 16 14 14   Temp:  99.8 °F (37.7 °C) 97.6 °F (36.4 °C) 97.6 °F (36.4 °C)   TempSrc:  Oral Oral Oral   SpO2: 98% 98% 98% 97%   Weight:  168 lb 12.8 oz (76.6 kg)     Height:  5' 2\" (1.575 m)         Intake/Output Summary (Last 24 hours) at 2024 0941  Last data filed at 2024 0600  Gross per 24 hour   Intake 730 ml   Output 750 ml   Net -20 ml           Physical Exam:    GENERAL:  Awake and alert, in no acute distress.  HEART:  Regular rhythm, regular rate  LUNGS:  Air entry was good.  No increased work of breathing or wheezes   ABDOMEN: Soft and non-tender.    NEUROLOGICAL: Awake and alert, bilateral lower extremity weakness noted.  Sensation appears symmetrical in lower extremities.    SKIN:  Warm and well perfused  PSYCHIATRIC: Normal mood    Diagnostic Data:    Labs:    Recent Labs   Lab 24  1501 24  0519   WBC 7.5 6.2   HGB 13.3 13.6   MCV 92.0 91.9   .0 198.0       Recent Labs   Lab 24  1501 24  0539   GLU 99 180*   BUN 15 15   CREATSERUM 0.97 1.00   CA 9.9 10.1   ALB 4.6  --     139   K 4.8 3.9    101   CO2 31.0 30.0   ALKPHO 68  --    AST  53*  --    ALT 33  --    BILT 1.1  --    TP 7.3  --            Estimated Creatinine Clearance: 37.9 mL/min (based on SCr of 1 mg/dL).    No results for input(s): \"PTP\", \"INR\" in the last 168 hours.    Lab Results   Component Value Date    TSH 0.201 12/24/2024    T4F 1.1 12/24/2024    T3F 2.41 12/24/2024       COVID-19  Lab Results   Component Value Date    COVID19 Not Detected 12/24/2024       Pro-Calcitonin  No results for input(s): \"PCT\" in the last 168 hours.    Cardiac  Recent Labs   Lab 12/24/24  1501   PBNP 109       Inflammatory Markers  Recent Labs   Lab 12/24/24  1501   DDIMER 1.48*       Culture:  No results found for this visit on 12/24/24.    US VENOUS DOPPLER LEG BILAT - DIAG IMG (CPT=93970)    Result Date: 12/24/2024  CONCLUSION:   No DVT in either lower extremity.     Dictated by (CST): Keegan Santana MD on 12/24/2024 at 8:47 PM     Finalized by (CST): Keegan Santana MD on 12/24/2024 at 8:48 PM          CT SPINE LUMBAR (CPT=72131)    Result Date: 12/24/2024  CONCLUSION:   Extensive pedicle screw and posterior fusion hardware seen throughout the entire lumbar spine.  Disc replacement at L5-S1 demonstrate slight anterior positioning which is nonspecific.  Correlation with prior imaging and operative report recommended.  Remainder of the surgical hardware is otherwise intact.  No high-grade canal or foraminal narrowing given limitations from streak artifact from patient's hardware.  No acute fracture or vertebral body height loss.     Dictated by (CST): Keegan Santana MD on 12/24/2024 at 7:53 PM     Finalized by (CST): Keegan Santana MD on 12/24/2024 at 7:57 PM          CT CHEST PE AORTA (IV ONLY) (CPT=71260)    Result Date: 12/24/2024  CONCLUSION:   No pulmonary embolus in the central, main, lobar, segmental pulmonary arteries. Nondiagnostic in the subsegmental pulmonary arteries due to respiratory motion artifact.  Mosaic attenuation of the lungs bilaterally suggestive of air trapping which can be seen with  small vessel or small airways disease.  Bronchial wall thickening suggestive of mild chronic airway inflammation.   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    Dictated by (CST): Keegan Santana MD on 12/24/2024 at 7:43 PM     Finalized by (CST): Keegan Santana MD on 12/24/2024 at 7:46 PM          XR ANKLE (MIN 3 VIEWS), RIGHT (CPT=73610)    Result Date: 12/24/2024  CONCLUSION:  1. No fracture or dislocation. 2. Partially imaged postoperative changes related to a remote bunionectomy. 3. Stable mild talonavicular joint osteoarthritis. 4. Sclerotic lesions in the calcaneal body are unchanged from 2015 consistent with benign/nonaggressive lesions such as bone islands.    Dictated by (CST): Alfonzo Zheng MD on 12/24/2024 at 4:35 PM     Finalized by (CST): Alfonzo Zheng MD on 12/24/2024 at 4:37 PM          XR PELVIS (1 VIEW) (CPT=72170)    Result Date: 12/24/2024  CONCLUSION:  1. No fracture or dislocation. 2. Mild bilateral hip joint osteoarthritis. 3. Partially imaged postoperative changes related to a multilevel lumbar laminectomy and posterior instrumented fusion with interbody fusion at L5-S1 and bilateral sacroiliac joint fusion.    Dictated by (CST): Alfonzo Zheng MD on 12/24/2024 at 4:33 PM     Finalized by (CST): Alfonzo Zheng MD on 12/24/2024 at 4:34 PM          XR CHEST AP PORTABLE  (CPT=71045)    Result Date: 12/24/2024  CONCLUSION:  No acute cardiopulmonary process.    Dictated by (CST): Alfonzo Zheng MD on 12/24/2024 at 4:32 PM     Finalized by (CST): Alfonzo Zheng MD on 12/24/2024 at 4:33 PM           EKG 12 Lead    Result Date: 12/24/2024  Normal sinus rhythm Normal ECG No previous ECGs found in [facility]     Medications:    methylPREDNISolone  40 mg Intravenous Q12H       Assessment & Plan:      Bilateral lower extremity weakness  -With recurrent falls and previously reported bilateral lower extremity numbness and tingling  -Neurology  consulted, expressed concerns regarding myelopathy.  -Recommend further evaluation with MRI of complete spine.  -Some initial concern for possible post viral syndrome.  Patient was started on steroids, will continue at this time.  -Symptom relief as able  -PT/OT once MRIs completed    Possible acute cystitis  -UA with signs of infection  -Prelim urine culture with E. coli.  Follow-up final.  -Continue broad-spectrum antibiotics and IV fluids  -Continue to monitor    Hyperlipidemia  -Statin    History of CAD  -Holding aspirin until determination that no surgical intervention needed.  -Continue statin      Plan of care discussed with patient at bedside . Discussed management/test result(s) with Rn and neurology consultant    Quality:  DVT Prophylaxis: Heparin  CODE status: Full  Estimated date of discharge: TBD  Discharge is dependent on: clinical stability    Adrien Guerra MD          This note was prepared using Dragon Medical voice recognition dictation software. As a result errors may occur. When identified these errors have been corrected. While every attempt is made to correct errors during dictation discrepancies may still exist

## 2024-12-25 NOTE — SPIRITUAL CARE NOTE
Spiritual Care Visit Note    Patient Name: Melisa Agosto Date of Spiritual Care Visit: 24   : 1950 Primary Dx: Weakness generalized       Referred By: Referral From: Nurse, Advanced Directives Consult    Spiritual Care Taxonomy:    Intended Effects: Aligning care plan with patient's values    Methods: Explore christiano and values    Interventions: Ask guided questions about cultural and Bahai values;Assist someone with Advance Directives    Visit Type/Summary:     - Spiritual Care: Consulted with RN prior to visit. Offered empathic listening and emotional support. Patient and family expressed appreciation for  visit.  - PoA: Other: Patient did not wish to receive advance directive information at this time. Left PoA information and Spiritual Care contact information.  remains available for follow up.    Spiritual Care support can be requested via an Epic consult. For urgent/immediate needs, please contact the On Call  at: Micro: ext [phone]    Chaplain Mateo.

## 2024-12-25 NOTE — PLAN OF CARE
Problem: Patient Centered Care  Goal: Patient preferences are identified and integrated in the patient's plan of care  Description: Interventions:  - What would you like us to know as we care for you? I am from home with my daughter  - Provide timely, complete, and accurate information to patient/family  - Incorporate patient and family knowledge, values, beliefs, and cultural backgrounds into the planning and delivery of care  - Encourage patient/family to participate in care and decision-making at the level they choose  - Honor patient and family perspectives and choices  Outcome: Progressing     Problem: Patient/Family Goals  Goal: Patient/Family Long Term Goal  Description: Patient's Long Term Goal: Discharge    Interventions:  - Monitor vitals  - Monitor appropriate labs  - Administer medications as ordered  - Follow MD's orders  - Update patient on plan of care   - Discharge planning     - See additional Care Plan goals for specific interventions  Outcome: Progressing  Goal: Patient/Family Short Term Goal  Description: Patient's Short Term Goal:     Interventions:   -   - See additional Care Plan goals for specific interventions  Outcome: Progressing     Problem: Impaired Functional Mobility  Goal: Achieve highest/safest level of mobility/gait  Description: Interventions:  - Assess patient's functional ability and stability  - Promote increasing activity/tolerance for mobility and gait  - Educate and engage patient/family in tolerated activity level and precautions    Outcome: Progressing     Problem: PAIN - ADULT  Goal: Verbalizes/displays adequate comfort level or patient's stated pain goal  Description: INTERVENTIONS:  - Encourage pt to monitor pain and request assistance  - Assess pain using appropriate pain scale  - Administer analgesics based on type and severity of pain and evaluate response  - Implement non-pharmacological measures as appropriate and evaluate response  - Consider cultural and social  influences on pain and pain management  - Manage/alleviate anxiety  - Utilize distraction and/or relaxation techniques  - Monitor for opioid side effects  - Notify MD/LIP if interventions unsuccessful or patient reports new pain  - Anticipate increased pain with activity and pre-medicate as appropriate  Outcome: Progressing     Problem: RISK FOR INFECTION - ADULT  Goal: Absence of fever/infection during anticipated neutropenic period  Description: INTERVENTIONS  - Monitor WBC  - Administer growth factors as ordered  - Implement neutropenic guidelines  Outcome: Progressing     Problem: SAFETY ADULT - FALL  Goal: Free from fall injury  Description: INTERVENTIONS:  - Assess pt frequently for physical needs  - Identify cognitive and physical deficits and behaviors that affect risk of falls.  - [place] fall precautions as indicated by assessment.  - Educate pt/family on patient safety including physical limitations  - Instruct pt to call for assistance with activity based on assessment  - Modify environment to reduce risk of injury  - Provide assistive devices as appropriate  - Consider OT/PT consult to assist with strengthening/mobility  - Encourage toileting schedule  Outcome: Progressing

## 2024-12-25 NOTE — ED QUICK NOTES
Patient stable for transfer to floor at this time. A&Ox4, skin p/w/d. Denies cp/caroline. Pain managed. Iv site patent and intact. All belongings accompanying patient to floor.

## 2024-12-25 NOTE — PHYSICAL THERAPY NOTE
PHYSICAL THERAPY EVALUATION - INPATIENT     Room Number: 538/538-A  Evaluation Date: 12/25/2024  Type of Evaluation: Initial   Physician Order: PT Eval and Treat    Presenting Problem: admitted with BLE, R knee and ankle pain  Co-Morbidities : DJD L spine, neuropathy, OA, ankle tumor resection, CAD  Reason for Therapy: Mobility Dysfunction and Discharge Planning    PHYSICAL THERAPY ASSESSMENT   Patient is a 74 year old female admitted 12/24/2024 for BLE and R ankle and knee pain.  Prior to admission, patient's baseline is independent with cane /rolling walker. Lives with daughter in a house with 4 CAT[place].  Patient is currently functioning below baseline with bed mobility, transfers, gait, stair negotiation, standing prolonged periods, and performing household tasks.  Patient is requiring contact guard assist as a result of the following impairments: decreased functional strength, pain, and impaired standing balance.  Physical Therapy will continue to follow for duration of hospitalization.    Patient will benefit from continued skilled PT Services at discharge to promote prior level of function and safety with additional support and return home with home health PT.    PLAN DURING HOSPITALIZATION  Nursing Mobility Recommendation : 1 Assist  PT Device Recommendation: Rolling walker     Rehab Potential : Good  Frequency (Obs): 3-5x/week     PHYSICAL THERAPY MEDICAL/SOCIAL HISTORY       Problem List  Principal Problem:    Weakness generalized  Active Problems:    Recurrent falls    History of influenza vaccination    Pyuria    Neurogenic claudication      HOME SITUATION  Type of Home: House  Home Layout: Two level  Stairs to Enter : 4      Stairs to Bedroom:  (pt'd bedroom /bathroom on main level)         Lives With: Daughter    Drives: Yes         Prior Level of [place]: independent with RW/cane. Still drives.     SUBJECTIVE  \"They gave me pain meds. I don't have pain now\"    PHYSICAL THERAPY EXAMINATION    OBJECTIVE  Precautions: Limb alert - right  Fall Risk: Standard fall risk    WEIGHT BEARING RESTRICTION   none    PAIN ASSESSMENT  Ratin          COGNITION  Overall Cognitive Status:  WFL - within functional limits    RANGE OF MOTION AND STRENGTH ASSESSMENT  Upper extremity ROM and strength are within functional limits   Lower extremity ROM is within functional limits   Lower extremity strength is within functional limits     BALANCE  Static Sitting: Normal  Dynamic Sitting: Good  Static Standing: Fair +  Dynamic Standing: Fair -    ADDITIONAL TESTS                                    NEUROLOGICAL FINDINGS                      ACTIVITY TOLERANCE  Pulse: 82  Heart Rate Source: Monitor                   O2 WALK  Oxygen Therapy  SPO2% on Room Air at Rest: 98    AM-PAC '6-Clicks' INPATIENT SHORT FORM - BASIC MOBILITY  How much difficulty does the patient currently have...  Patient Difficulty: Turning over in bed (including adjusting bedclothes, sheets and blankets)?: None   Patient Difficulty: Sitting down on and standing up from a chair with arms (e.g., wheelchair, bedside commode, etc.): A Little   Patient Difficulty: Moving from lying on back to sitting on the side of the bed?: A Little   How much help from another person does the patient currently need...   Help from Another: Moving to and from a bed to a chair (including a wheelchair)?: A Little   Help from Another: Need to walk in hospital room?: A Little   Help from Another: Climbing 3-5 steps with a railing?: A Little     AM-PAC Score:  Raw Score: 19   Approx Degree of Impairment: 41.77%   Standardized Score (AM-PAC Scale): 45.44   CMS Modifier (G-Code): CK    FUNCTIONAL ABILITY STATUS  Functional Mobility/Gait Assessment  Gait Assistance: Contact guard assist  Distance (ft): 160 ft  Assistive Device: Rolling walker  Pattern:  (decreased step length, slow keyla)  Rolling: stand-by assist  Supine to Sit: contact guard assist  Sit to Stand: contact guard  assist    Exercise/Education Provided:  Bed mobility  Gait training  Transfer training    Skilled Therapy Provided: pt received in bed. RN approved activity. Pt participated well with PT. Able to complete transfers with CGA. Verbal cues for sequencing, hand placements. Able to ambulate 160 ft with RW, CGA. Decreased step length and decreased gait speed but stable with assistive device    The patient's Approx Degree of Impairment: 41.77% has been calculated based on documentation in the [facility] '6 clicks' Inpatient Basic Mobility Short Form.  Research supports that patients with this level of impairment may benefit from Home PT.  Final disposition will be made by interdisciplinary medical team.    Patient End of Session: Up in chair;Needs met;Call light within reach;RN aware of session/findings;Hospital anti-slip socks;Alarm set    CURRENT GOALS  Goals to be met by: 1/8/25  Patient Goal Patient's self-stated goal is: to walk better   Goal #1 Patient is able to demonstrate supine - sit EOB @ level: independent     Goal #1   Current Status    Goal #2 Patient is able to demonstrate transfers EOB to/from Chair/Wheelchair at assistance level: independent with rolling walker     Goal #2  Current Status    Goal #3 Patient is able to ambulate 200 feet with assist device: rolling walker at assistance level: modified independent   Goal #3   Current Status    Goal #4 Patient will negotiate 4 stairs/one curb w/ assistive device and supervision   Goal #4   Current Status    Goal #5 Patient to demonstrate independence with home activity/exercise instructions provided to patient in preparation for discharge.   Goal #5   Current Status      Patient Evaluation Complexity Level:  History Moderate - 1 or 2 personal factors and/or co-morbidities   Examination of body systems Moderate - addressing a total of 3 or more elements   Clinical Presentation  Moderate - Evolving   Clinical Decision Making  Moderate Complexity     Gait Training: 15  minutes  Therapeutic Activity:  15 minutes

## 2024-12-26 ENCOUNTER — APPOINTMENT (OUTPATIENT)
Dept: MRI IMAGING | Facility: HOSPITAL | Age: 74
End: 2024-12-26
Attending: INTERNAL MEDICINE
Payer: MEDICARE

## 2024-12-26 LAB
ANION GAP SERPL CALC-SCNC: 7 MMOL/L (ref 0–18)
ATRIAL RATE: 86 BPM
BASOPHILS # BLD AUTO: 0.01 X10(3) UL (ref 0–0.2)
BASOPHILS NFR BLD AUTO: 0.1 %
BUN BLD-MCNC: 20 MG/DL (ref 9–23)
BUN/CREAT SERPL: 24.1 (ref 10–20)
CALCIUM BLD-MCNC: 9.8 MG/DL (ref 8.7–10.4)
CHLORIDE SERPL-SCNC: 105 MMOL/L (ref 98–112)
CO2 SERPL-SCNC: 30 MMOL/L (ref 21–32)
CREAT BLD-MCNC: 0.83 MG/DL
DEPRECATED RDW RBC AUTO: 46.1 FL (ref 35.1–46.3)
EGFRCR SERPLBLD CKD-EPI 2021: 73 ML/MIN/1.73M2 (ref 60–?)
EOSINOPHIL # BLD AUTO: 0 X10(3) UL (ref 0–0.7)
EOSINOPHIL NFR BLD AUTO: 0 %
ERYTHROCYTE [DISTWIDTH] IN BLOOD BY AUTOMATED COUNT: 14 % (ref 11–15)
GLUCOSE BLD-MCNC: 182 MG/DL (ref 70–99)
HCT VFR BLD AUTO: 39 %
HGB BLD-MCNC: 13.3 G/DL
IMM GRANULOCYTES # BLD AUTO: 0.07 X10(3) UL (ref 0–1)
IMM GRANULOCYTES NFR BLD: 0.4 %
LYMPHOCYTES # BLD AUTO: 1.96 X10(3) UL (ref 1–4)
LYMPHOCYTES NFR BLD AUTO: 11.5 %
MCH RBC QN AUTO: 30.4 PG (ref 26–34)
MCHC RBC AUTO-ENTMCNC: 34.1 G/DL (ref 31–37)
MCV RBC AUTO: 89 FL
MONOCYTES # BLD AUTO: 0.57 X10(3) UL (ref 0.1–1)
MONOCYTES NFR BLD AUTO: 3.3 %
NEUTROPHILS # BLD AUTO: 14.46 X10 (3) UL (ref 1.5–7.7)
NEUTROPHILS # BLD AUTO: 14.46 X10(3) UL (ref 1.5–7.7)
NEUTROPHILS NFR BLD AUTO: 84.7 %
OSMOLALITY SERPL CALC.SUM OF ELEC: 301 MOSM/KG (ref 275–295)
P AXIS: 24 DEGREES
P-R INTERVAL: 176 MS
PLATELET # BLD AUTO: 249 10(3)UL (ref 150–450)
POTASSIUM SERPL-SCNC: 3.9 MMOL/L (ref 3.5–5.1)
Q-T INTERVAL: 348 MS
QRS DURATION: 90 MS
QTC CALCULATION (BEZET): 416 MS
R AXIS: -10 DEGREES
RBC # BLD AUTO: 4.38 X10(6)UL
SODIUM SERPL-SCNC: 142 MMOL/L (ref 136–145)
T AXIS: 16 DEGREES
VENTRICULAR RATE: 86 BPM
WBC # BLD AUTO: 17.1 X10(3) UL (ref 4–11)

## 2024-12-26 PROCEDURE — 99233 SBSQ HOSP IP/OBS HIGH 50: CPT | Performed by: INTERNAL MEDICINE

## 2024-12-26 PROCEDURE — 72146 MRI CHEST SPINE W/O DYE: CPT | Performed by: INTERNAL MEDICINE

## 2024-12-26 PROCEDURE — 72141 MRI NECK SPINE W/O DYE: CPT | Performed by: INTERNAL MEDICINE

## 2024-12-26 RX ORDER — CEFADROXIL 500 MG/1
500 CAPSULE ORAL 2 TIMES DAILY
Qty: 6 CAPSULE | Refills: 0 | Status: SHIPPED | OUTPATIENT
Start: 2024-12-26 | End: 2024-12-29

## 2024-12-26 NOTE — PLAN OF CARE
Problem: Patient Centered Care  Goal: Patient preferences are identified and integrated in the patient's plan of care  Description: Interventions:  - What would you like us to know as we care for you? I am from home with my daughter  - Provide timely, complete, and accurate information to patient/family  - Incorporate patient and family knowledge, values, beliefs, and cultural backgrounds into the planning and delivery of care  - Encourage patient/family to participate in care and decision-making at the level they choose  - Honor patient and family perspectives and choices  Outcome: Progressing     Problem: Patient/Family Goals  Goal: Patient/Family Long Term Goal  Description: Patient's Long Term Goal: Discharge    Interventions:  - Monitor vitals  - Monitor appropriate labs  - Administer medications as ordered  - Follow MD's orders  - Update patient on plan of care   - Discharge planning     - See additional Care Plan goals for specific interventions  Outcome: Progressing  Goal: Patient/Family Short Term Goal  Description: Patient's Short Term Goal:     Interventions:   -   - See additional Care Plan goals for specific interventions  Outcome: Progressing     Problem: Impaired Functional Mobility  Goal: Achieve highest/safest level of mobility/gait  Description: Interventions:  - Assess patient's functional ability and stability  - Promote increasing activity/tolerance for mobility and gait  - Educate and engage patient/family in tolerated activity level and precautions    Outcome: Progressing     Problem: PAIN - ADULT  Goal: Verbalizes/displays adequate comfort level or patient's stated pain goal  Description: INTERVENTIONS:  - Encourage pt to monitor pain and request assistance  - Assess pain using appropriate pain scale  - Administer analgesics based on type and severity of pain and evaluate response  - Implement non-pharmacological measures as appropriate and evaluate response  - Consider cultural and social  influences on pain and pain management  - Manage/alleviate anxiety  - Utilize distraction and/or relaxation techniques  - Monitor for opioid side effects  - Notify MD/LIP if interventions unsuccessful or patient reports new pain  - Anticipate increased pain with activity and pre-medicate as appropriate  Outcome: Progressing     Problem: RISK FOR INFECTION - ADULT  Goal: Absence of fever/infection during anticipated neutropenic period  Description: INTERVENTIONS  - Monitor WBC  - Administer growth factors as ordered  - Implement neutropenic guidelines  Outcome: Progressing     Problem: SAFETY ADULT - FALL  Goal: Free from fall injury  Description: INTERVENTIONS:  - Assess pt frequently for physical needs  - Identify cognitive and physical deficits and behaviors that affect risk of falls.  - [place] fall precautions as indicated by assessment.  - Educate pt/family on patient safety including physical limitations  - Instruct pt to call for assistance with activity based on assessment  - Modify environment to reduce risk of injury  - Provide assistive devices as appropriate  - Consider OT/PT consult to assist with strengthening/mobility  - Encourage toileting schedule  Outcome: Progressing

## 2024-12-26 NOTE — PROGRESS NOTES
[facility]  part of [facility]     Hospitalist Progress Note     Melisa Agosto Patient Status:  Observation    1950 MRN [id number]   Location [facility] 5SW/SE Attending Adrien Guerra MD   Hosp Day # 0 PCP No primary care provider on file.     Chief Complaint:   Chief Complaint   Patient presents with    Chest Pain Angina    Fatigue    Leg or Foot Injury        Subjective:     Patient seen sitting in bed.  No family at bedside.  Patient reports her back pain is improved.  Patient also reports improving lower extremity strength.  Patient denies further numbness or tingling.  Patient does report concerns about staying in the hospital further.  Patient reports she has multiple social issues to take care of at home.    Objective:      Vital signs:  Vitals:    24 1557 24 2150 24 0545 24 0920   BP: 123/78 124/65 131/74 141/72   BP Location: Right arm Right arm Right arm Right arm   Pulse: 72 78 80 78   Resp: 14 16 16 16   Temp: 97.3 °F (36.3 °C) 97.7 °F (36.5 °C) 98.1 °F (36.7 °C) 98.2 °F (36.8 °C)   TempSrc: Oral Oral Oral Oral   SpO2: 98% 99% 99% 97%   Weight:       Height:           Intake/Output Summary (Last 24 hours) at 2024 0929  Last data filed at 2024 0556  Gross per 24 hour   Intake 480 ml   Output 450 ml   Net 30 ml           Physical Exam:    GENERAL:  Awake and alert, in no acute distress.  HEART:  Regular rhythm, regular rate  LUNGS:  Air entry was good.  No increased work of breathing or wheezes   ABDOMEN: Soft and non-tender.    NEUROLOGICAL: Awake and alert, bilateral lower extremity weakness noted, improving from yesterday.  Sensation appears intact and symmetrical in lower extremities.    SKIN:  Warm and well perfused  PSYCHIATRIC: Normal mood    Diagnostic Data:    Labs:    Recent Labs   Lab 24  1501 24  0519 24  0547   WBC 7.5 6.2 17.1*   HGB 13.3 13.6 13.3   MCV 92.0 91.9 89.0   .0 198.0 249.0        Recent Labs   Lab 12/24/24  1501 12/25/24  0539 12/26/24  0547   GLU 99 180* 182*   BUN 15 15 20   CREATSERUM 0.97 1.00 0.83   CA 9.9 10.1 9.8   ALB 4.6  --   --     139 142   K 4.8 3.9 3.9    101 105   CO2 31.0 30.0 30.0   ALKPHO 68  --   --    AST 53*  --   --    ALT 33  --   --    BILT 1.1  --   --    TP 7.3  --   --            Estimated Creatinine Clearance: 45.6 mL/min (based on SCr of 0.83 mg/dL).    No results for input(s): \"PTP\", \"INR\" in the last 168 hours.           COVID-19  Lab Results   Component Value Date    COVID19 Not Detected 12/24/2024       Pro-Calcitonin  No results for input(s): \"PCT\" in the last 168 hours.    Cardiac  Recent Labs   Lab 12/24/24  1501   PBNP 109       Inflammatory Markers  Recent Labs   Lab 12/24/24  1501   DDIMER 1.48*       Culture:  Hospital Encounter on 12/24/24   1. Urine Culture, Routine     Status: Abnormal    Collection Time: 12/24/24  4:31 PM    Specimen: Urine, clean catch   Result Value Ref Range    Urine Culture >100,000 CFU/ML Escherichia coli (A) N/A       Susceptibility    Escherichia coli -  (no method available)     Ampicillin <=2 Sensitive      Cefazolin <=4 Sensitive      Ciprofloxacin <=0.25 Sensitive      Gentamicin <=1 Sensitive      Meropenem <=0.25 Sensitive      Levofloxacin <=0.12 Sensitive      Nitrofurantoin 256 Resistant      Piperacillin + Tazobactam <=4 Sensitive      Trimethoprim/Sulfa <=20 Sensitive        US VENOUS DOPPLER LEG BILAT - DIAG IMG (CPT=93970)    Result Date: 12/24/2024  CONCLUSION:   No DVT in either lower extremity.     Dictated by (CST): Keegan Santana MD on 12/24/2024 at 8:47 PM     Finalized by (CST): Keegan Santana MD on 12/24/2024 at 8:48 PM          CT SPINE LUMBAR (CPT=72131)    Result Date: 12/24/2024  CONCLUSION:   Extensive pedicle screw and posterior fusion hardware seen throughout the entire lumbar spine.  Disc replacement at L5-S1 demonstrate slight anterior positioning which is nonspecific.  Correlation  with prior imaging and operative report recommended.  Remainder of the surgical hardware is otherwise intact.  No high-grade canal or foraminal narrowing given limitations from streak artifact from patient's hardware.  No acute fracture or vertebral body height loss.     Dictated by (CST): Keegan Santana MD on 12/24/2024 at 7:53 PM     Finalized by (CST): Keegan Santana MD on 12/24/2024 at 7:57 PM          CT CHEST PE AORTA (IV ONLY) (CPT=71260)    Result Date: 12/24/2024  CONCLUSION:   No pulmonary embolus in the central, main, lobar, segmental pulmonary arteries. Nondiagnostic in the subsegmental pulmonary arteries due to respiratory motion artifact.  Mosaic attenuation of the lungs bilaterally suggestive of air trapping which can be seen with small vessel or small airways disease.  Bronchial wall thickening suggestive of mild chronic airway inflammation.   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    Dictated by (CST): Keegan Santana MD on 12/24/2024 at 7:43 PM     Finalized by (CST): Keegan Santana MD on 12/24/2024 at 7:46 PM          XR ANKLE (MIN 3 VIEWS), RIGHT (CPT=73610)    Result Date: 12/24/2024  CONCLUSION:  1. No fracture or dislocation. 2. Partially imaged postoperative changes related to a remote bunionectomy. 3. Stable mild talonavicular joint osteoarthritis. 4. Sclerotic lesions in the calcaneal body are unchanged from 2015 consistent with benign/nonaggressive lesions such as bone islands.    Dictated by (CST): Alfonzo Zheng MD on 12/24/2024 at 4:35 PM     Finalized by (CST): Alfonzo Zheng MD on 12/24/2024 at 4:37 PM          XR PELVIS (1 VIEW) (CPT=72170)    Result Date: 12/24/2024  CONCLUSION:  1. No fracture or dislocation. 2. Mild bilateral hip joint osteoarthritis. 3. Partially imaged postoperative changes related to a multilevel lumbar laminectomy and posterior instrumented fusion with interbody fusion at L5-S1 and bilateral sacroiliac joint  fusion.    Dictated by (CST): Alfonzo Zheng MD on 12/24/2024 at 4:33 PM     Finalized by (CST): Alfonzo Zheng MD on 12/24/2024 at 4:34 PM          XR CHEST AP PORTABLE  (CPT=71045)    Result Date: 12/24/2024  CONCLUSION:  No acute cardiopulmonary process.    Dictated by (CST): Alfonzo Zheng MD on 12/24/2024 at 4:32 PM     Finalized by (CST): Alfonzo Zheng MD on 12/24/2024 at 4:33 PM           EKG 12 Lead    Result Date: 12/26/2024  Normal sinus rhythm Normal ECG No previous ECGs found in [facility]     Medications:    ceFAZolin  2 g Intravenous Q8H    atorvastatin  40 mg Oral Nightly    heparin  5,000 Units Subcutaneous Q8H FABIÁN    methylPREDNISolone  40 mg Intravenous Q12H       Assessment & Plan:      Bilateral lower extremity weakness  -With recurrent falls and previously reported bilateral lower extremity numbness and tingling  -Neurology consulted, expressed concerns regarding myelopathy.  -Recommend further evaluation with MRI of complete spine, awaiting at this time  -Some initial concern for possible post viral syndrome.  Patient was started on steroids, discontinue at this time.  -Symptom relief as able  -PT/OT     Acute cystitis  -UA with signs of infection  -urine culture with E. coli.  Sensitivities noted  -Continue antibiotics   -Continue to monitor    Hyperlipidemia  -Statin    History of CAD  -Holding aspirin until determination that no surgical intervention needed.  -Continue statin      Plan of care discussed with patient at bedside . Discussed management/test result(s) with Rn consultant    Quality:  DVT Prophylaxis: Heparin  CODE status: Full  Estimated date of discharge: TBD  Discharge is dependent on: clinical stability    Adrien Guerra MD          This note was prepared using Dragon Medical voice recognition dictation software. As a result errors may occur. When identified these errors have been corrected. While every attempt is made to correct errors during dictation  discrepancies may still exist

## 2024-12-26 NOTE — PLAN OF CARE
Problem: Patient Centered Care  Goal: Patient preferences are identified and integrated in the patient's plan of care  Description: Interventions:  - What would you like us to know as we care for you? I am from home with my daughter  - Provide timely, complete, and accurate information to patient/family  - Incorporate patient and family knowledge, values, beliefs, and cultural backgrounds into the planning and delivery of care  - Encourage patient/family to participate in care and decision-making at the level they choose  - Honor patient and family perspectives and choices  Outcome: Progressing     Problem: Patient/Family Goals  Goal: Patient/Family Long Term Goal  Description: Patient's Long Term Goal: Discharge    Interventions:  - Monitor vitals  - Monitor appropriate labs  - Administer medications as ordered  - Follow MD's orders  - Update patient on plan of care   - Discharge planning     - See additional Care Plan goals for specific interventions  Outcome: Progressing  Goal: Patient/Family Short Term Goal  Description: Patient's Short Term Goal:     Interventions:   -   - See additional Care Plan goals for specific interventions  Outcome: Progressing     Problem: Impaired Functional Mobility  Goal: Achieve highest/safest level of mobility/gait  Description: Interventions:  - Assess patient's functional ability and stability  - Promote increasing activity/tolerance for mobility and gait  - Educate and engage patient/family in tolerated activity level and precautions    Outcome: Progressing     Problem: PAIN - ADULT  Goal: Verbalizes/displays adequate comfort level or patient's stated pain goal  Description: INTERVENTIONS:  - Encourage pt to monitor pain and request assistance  - Assess pain using appropriate pain scale  - Administer analgesics based on type and severity of pain and evaluate response  - Implement non-pharmacological measures as appropriate and evaluate response  - Consider cultural and social  influences on pain and pain management  - Manage/alleviate anxiety  - Utilize distraction and/or relaxation techniques  - Monitor for opioid side effects  - Notify MD/LIP if interventions unsuccessful or patient reports new pain  - Anticipate increased pain with activity and pre-medicate as appropriate  Outcome: Progressing     Problem: RISK FOR INFECTION - ADULT  Goal: Absence of fever/infection during anticipated neutropenic period  Description: INTERVENTIONS  - Monitor WBC  - Administer growth factors as ordered  - Implement neutropenic guidelines  Outcome: Progressing     Problem: SAFETY ADULT - FALL  Goal: Free from fall injury  Description: INTERVENTIONS:  - Assess pt frequently for physical needs  - Identify cognitive and physical deficits and behaviors that affect risk of falls.  - Lutz fall precautions as indicated by assessment.  - Educate pt/family on patient safety including physical limitations  - Instruct pt to call for assistance with activity based on assessment  - Modify environment to reduce risk of injury  - Provide assistive devices as appropriate  - Consider OT/PT consult to assist with strengthening/mobility  - Encourage toileting schedule  Outcome: Progressing

## 2024-12-26 NOTE — PHYSICAL THERAPY NOTE
PT follow-up treatment attempted. Communicated with RN who reports that patient not appropriate to participate in therapeutic interventions at this time due to frequent bowel movements, requesting to defer PT follow-up today. Will re-schedule follow-up.     Jenna Haase, PT, DPT  [facility]  Ext: [phone]

## 2024-12-27 VITALS
HEART RATE: 58 BPM | TEMPERATURE: 98 F | OXYGEN SATURATION: 95 % | HEIGHT: 62 IN | SYSTOLIC BLOOD PRESSURE: 122 MMHG | BODY MASS INDEX: 31.06 KG/M2 | WEIGHT: 168.81 LBS | DIASTOLIC BLOOD PRESSURE: 59 MMHG | RESPIRATION RATE: 16 BRPM

## 2024-12-27 LAB
ANION GAP SERPL CALC-SCNC: 6 MMOL/L (ref 0–18)
BASOPHILS # BLD AUTO: 0.02 X10(3) UL (ref 0–0.2)
BASOPHILS NFR BLD AUTO: 0.1 %
BUN BLD-MCNC: 24 MG/DL (ref 9–23)
BUN/CREAT SERPL: 27.3 (ref 10–20)
CALCIUM BLD-MCNC: 9.4 MG/DL (ref 8.7–10.4)
CHLORIDE SERPL-SCNC: 104 MMOL/L (ref 98–112)
CO2 SERPL-SCNC: 31 MMOL/L (ref 21–32)
CREAT BLD-MCNC: 0.88 MG/DL
DEPRECATED RDW RBC AUTO: 45.9 FL (ref 35.1–46.3)
EGFRCR SERPLBLD CKD-EPI 2021: 68 ML/MIN/1.73M2 (ref 60–?)
EOSINOPHIL # BLD AUTO: 0.01 X10(3) UL (ref 0–0.7)
EOSINOPHIL NFR BLD AUTO: 0.1 %
ERYTHROCYTE [DISTWIDTH] IN BLOOD BY AUTOMATED COUNT: 14.2 % (ref 11–15)
GLUCOSE BLD-MCNC: 112 MG/DL (ref 70–99)
HCT VFR BLD AUTO: 37.1 %
HGB BLD-MCNC: 12.6 G/DL
IMM GRANULOCYTES # BLD AUTO: 0.05 X10(3) UL (ref 0–1)
IMM GRANULOCYTES NFR BLD: 0.3 %
LYMPHOCYTES # BLD AUTO: 3.97 X10(3) UL (ref 1–4)
LYMPHOCYTES NFR BLD AUTO: 24.7 %
MCH RBC QN AUTO: 30.1 PG (ref 26–34)
MCHC RBC AUTO-ENTMCNC: 34 G/DL (ref 31–37)
MCV RBC AUTO: 88.8 FL
MONOCYTES # BLD AUTO: 1.22 X10(3) UL (ref 0.1–1)
MONOCYTES NFR BLD AUTO: 7.6 %
NEUTROPHILS # BLD AUTO: 10.79 X10 (3) UL (ref 1.5–7.7)
NEUTROPHILS # BLD AUTO: 10.79 X10(3) UL (ref 1.5–7.7)
NEUTROPHILS NFR BLD AUTO: 67.2 %
OSMOLALITY SERPL CALC.SUM OF ELEC: 297 MOSM/KG (ref 275–295)
PLATELET # BLD AUTO: 251 10(3)UL (ref 150–450)
POTASSIUM SERPL-SCNC: 3.6 MMOL/L (ref 3.5–5.1)
RBC # BLD AUTO: 4.18 X10(6)UL
SODIUM SERPL-SCNC: 141 MMOL/L (ref 136–145)
WBC # BLD AUTO: 16.1 X10(3) UL (ref 4–11)

## 2024-12-27 PROCEDURE — 99233 SBSQ HOSP IP/OBS HIGH 50: CPT | Performed by: OTHER

## 2024-12-27 PROCEDURE — 99239 HOSP IP/OBS DSCHRG MGMT >30: CPT | Performed by: INTERNAL MEDICINE

## 2024-12-27 RX ORDER — TRAMADOL HYDROCHLORIDE 50 MG/1
50 TABLET ORAL EVERY 6 HOURS PRN
Qty: 10 TABLET | Refills: 0 | Status: SHIPPED | OUTPATIENT
Start: 2024-12-27

## 2024-12-27 NOTE — PROGRESS NOTES
[facility]  [address] [place] [address], [address]  [facility] [address]  [phone]            Melisa Agosto Patient Status:  Inpatient    1950 MRN [id number]   Location [facility] 5SW/SE Attending No att. providers found   Hosp Day # 3 PCP No primary care provider on file.     Subjective:  Melisa Agosto is a(n) 74 year old female.  She told me today that the weakness in her lower extremities has improved significantly.  Denies pain  Had MRI cervical and thoracic spine see results below    Current Facility-Administered Medications   Medication Dose Route Frequency    ceFAZolin (Ancef) 2g in 10mL IV syringe premix  2 g Intravenous Q8H    atorvastatin (Lipitor) tab 40 mg  40 mg Oral Nightly    heparin (Porcine) 5000 UNIT/ML injection 5,000 Units  5,000 Units Subcutaneous Q8H FABIÁN    acetaminophen (Tylenol Extra Strength) tab 500 mg  500 mg Oral Q4H PRN    ondansetron (Zofran) 4 MG/2ML injection 4 mg  4 mg Intravenous Q6H PRN    metoclopramide (Reglan) 5 mg/mL injection 5 mg  5 mg Intravenous Q8H PRN    temazepam (Restoril) cap 15 mg  15 mg Oral Nightly PRN    morphINE PF 2 MG/ML injection 1 mg  1 mg Intravenous Q2H PRN    Or    morphINE PF 2 MG/ML injection 2 mg  2 mg Intravenous Q2H PRN    Or    morphINE PF 4 MG/ML injection 4 mg  4 mg Intravenous Q2H PRN    traMADol (Ultram) tab 50 mg  50 mg Oral QID PRN    cyclobenzaprine (Flexeril) tab 10 mg  10 mg Oral TID PRN       Objective:  Blood pressure 122/59, pulse 58, temperature 97.9 °F (36.6 °C), temperature source Oral, resp. rate 16, height 62\", weight 168 lb 12.8 oz (76.6 kg), SpO2 95%.    Physical Exam:  Vitals:    24 1616 24 2154 24 0506 24 0903   BP: 146/80 118/73 119/77 122/59   Pulse: 68 61 69 58   Resp: 16 18 16 16   Temp: 98.2 °F (36.8 °C) 98.1 °F (36.7 °C) 98.2 °F (36.8 °C) 97.9 °F (36.6 °C)   TempSrc: Oral Oral Oral Oral   SpO2: 99% 99% 95% 95%   Weight:       Height:            Neurological examination:  Alert attentive and fully oriented, visual fields full to confrontation test extraocular movements intact, face is symmetric, neck is supple tone is normal in upper extremities, she is mildly spastic in bilateral lower extremities.  5/5 motor power in proximal and distal muscles of her upper and lower extremities  Deep tendon reflexes were 2+ and symmetric in her upper extremities diminished in her lower extremities. Spont upgoing toe on the right?    Lab Results   Component Value Date    WBC 16.1 (H) 12/27/2024    HGB 12.6 12/27/2024    HCT 37.1 12/27/2024    .0 12/27/2024    CREATSERUM 0.88 12/27/2024    BUN 24 (H) 12/27/2024     12/27/2024    K 3.6 12/27/2024     12/27/2024    CO2 31.0 12/27/2024     (H) 12/27/2024    CA 9.4 12/27/2024    ALB 4.6 12/24/2024    ALKPHO 68 12/24/2024    BILT 1.1 12/24/2024    TP 7.3 12/24/2024    AST 53 (H) 12/24/2024    ALT 33 12/24/2024    PTT 40.5 (H) 09/12/2017    INR 1.1 09/12/2017    T4F 1.1 12/24/2024    TSH 0.201 (L) 12/24/2024    DDIMER 1.48 (H) 12/24/2024    MG 2.2 12/24/2024    TROP 0.00 09/12/2017     (H) 12/24/2024       MRI SPINE THORACIC (CPT=72146)    Result Date: 12/26/2024  CONCLUSION:  1. Multilevel degenerative disc disease with calcified thickened ligamentum flavum contributing to moderate central narrowing T10-11 and T11-12.  Minimal degenerative anterolisthesis of T10 on T11 also contributing to stenosis.  Moderate foraminal narrowing T9-10, T10-11 and T11-12. 2. Normal thoracic cord.  The portion behind T12 and L1 can not be adequately evaluated secondary to artifact from fusion hardware. 3. Posterior fluid collection along the multilevel laminectomy at site of fusion in the lumbar region seen at the edge of the field of view.  Finding may represent a chronic seroma or a pseudomeningocele.     Dictated by (CST): Sulaiman Marshall MD on 12/26/2024 at 11:11 PM     Finalized by (CST): Sulaiman Marshall,  MD on 12/26/2024 at 11:27 PM          MRI SPINE CERVICAL (CPT=72141)    Result Date: 12/26/2024  CONCLUSION:  1. Multilevel moderate to advanced degenerative disc disease/spondylosis with multilevel central and foraminal narrowing.  Moderate central narrowing C6-7.  Mild-to-moderate central narrowing C3-4.  Multilevel moderate to severe foraminal narrowing. 2. Large left thyroid goiter with intrathoracic extension.     Dictated by (CST): Sulaiman Marshall MD on 12/26/2024 at 11:02 PM     Finalized by (CST): Sulaiman Marshall MD on 12/26/2024 at 11:11 PM                 Assessment:  Patient Active Problem List   Diagnosis    Dyslipidemia    History of hysterectomy for benign disease    Lumbar post-laminectomy syndrome: L2-S1 laminectomies    L5-S1 left mod/right severe, L4-5 left mild-mod/right severe, L3-4 left severe, L2-3 left mod-severe foraminal stenosis    L2-3 left mild HNP, L3-4 left mild HNP, L4-5 right mild HNP    L3-4 grade 1-2 stable spondylolisthesis    Prediabetes    Other chest pain    Coronary artery disease involving native coronary artery of native heart without angina pectoris    S/P drug eluting coronary stent placement    Sciatica of left side    L3-4 mod-severe, L2-3 mod-severe, L1-2 mod central stenosis    Abnormal mammogram    Osteopenia    Hyperthyroidism    Breast mass    S/P colonoscopic polypectomy    Diverticulosis large intestine w/o perforation or abscess w/o bleeding    Internal hemorrhoids without complication    Weakness generalized    Recurrent falls    History of influenza vaccination    Pyuria    Neurogenic claudication     No evidence of compressive myelopathy at the cervical or thoracic level.  Will follow-up outpatient for further evaluation and management of her mild spasticity in the lower extremities.  She did have urinary tract infection that is currently being treated which would explain her urinary incontinence, she said that after she received antibiotics her urinary  incontinence improved    FU in outpatient neurology clinic in 6 weeks  Gonzalo Deras MD

## 2024-12-27 NOTE — DISCHARGE SUMMARY
[facility]  part of [facility]    Discharge Summary    Melisa Agosto Patient Status:  Inpatient    1950 MRN [id number]   Location [facility] 5SW/SE Attending Adrien Guerra MD   Hosp Day # 3 PCP No primary care provider on file.     Date of Admission: 2024     Date of Discharge: 24      Lace+ Score: 37  59-90 High Risk  29-58 Medium Risk  0-28   Low Risk.    TCM Follow-Up Recommendation:  LACE 29-58: Moderate Risk of readmission after discharge from the hospital.    DISCHARGE DX: Principal Problem:    Weakness generalized  Active Problems:    Recurrent falls    History of influenza vaccination    Pyuria    Neurogenic claudication       The patient was seen and examined on day of discharge and this discharge summary is in conjunction with any daily progress note from day of discharge.    HPI per admitting physician: \"Patient is a 74-year-old  female who came into the emergency department for evaluation of progressive leg weakness, tingling, and numbness bilaterally. Urinalysis showed possible urinary tract infection. D-dimer is still pending. Otherwise, CBC and chemistry were unremarkable. X-ray of the right ankle showed no acute fracture or dislocation. X-ray of the pelvis showed no fracture. Chest x-ray, no acute findings. MRI scan of the lumbar spine is still pending. \"    Hospital Course:      Bilateral lower extremity weakness  -With recurrent falls and previously reported bilateral lower extremity numbness and tingling  -Neurology consulted, expressed concerns regarding myelopathy.  - MRI of spine, No evidence of compressive myelopathy at the cervical or thoracic level.   -PT/OT   -Symptoms improving  -Neurology rec further follow up as outpt     Acute cystitis  -UA with signs of infection  -urine culture with E. coli.  Sensitivities noted  -Complete course of antibiotics      Hyperlipidemia  -Statin     History of CAD  -Restart aspirin    -Continue statin        Physical Exam:    Vitals:    12/26/24 1616 12/26/24 2154 12/27/24 0506 12/27/24 0903   BP: 146/80 118/73 119/77 122/59   BP Location: Right arm Right arm Right arm Right arm   Pulse: 68 61 69 58   Resp: 16 18 16 16   Temp: 98.2 °F (36.8 °C) 98.1 °F (36.7 °C) 98.2 °F (36.8 °C) 97.9 °F (36.6 °C)   TempSrc: Oral Oral Oral Oral   SpO2: 99% 99% 95% 95%   Weight:       Height:         Patient Weight for the past 72 hrs:   Weight   12/24/24 1447 165 lb (74.8 kg)   12/24/24 2206 168 lb 12.8 oz (76.6 kg)       Intake/Output Summary (Last 24 hours) at 12/27/2024 1007  Last data filed at 12/27/2024 0515  Gross per 24 hour   Intake 1697 ml   Output --   Net 1697 ml       GENERAL:  Awake and alert, in no acute distress.  HEART:  Regular rhythm, regular rate  LUNGS:  Air entry was good.  No increased work of breathing or wheezes   ABDOMEN: Soft and non-tender.    NEUROLOGICAL: Awake and alert, bilateral lower extremity weakness resolving   SKIN:  Warm and well perfused  PSYCHIATRIC: Normal mood    CULTURE:   Hospital Encounter on 12/24/24   1. Urine Culture, Routine     Status: Abnormal    Collection Time: 12/24/24  4:31 PM    Specimen: Urine, clean catch   Result Value Ref Range    Urine Culture >100,000 CFU/ML Escherichia coli (A) N/A       Susceptibility    Escherichia coli -  (no method available)     Ampicillin <=2 Sensitive      Cefazolin <=4 Sensitive      Ciprofloxacin <=0.25 Sensitive      Gentamicin <=1 Sensitive      Meropenem <=0.25 Sensitive      Levofloxacin <=0.12 Sensitive      Nitrofurantoin 256 Resistant      Piperacillin + Tazobactam <=4 Sensitive      Trimethoprim/Sulfa <=20 Sensitive        IMAGING STUDIES: SOME MAY NEED FOLLOW UP WITH PCP   MRI SPINE THORACIC (CPT=72146)    Result Date: 12/26/2024  CONCLUSION:  1. Multilevel degenerative disc disease with calcified thickened ligamentum flavum contributing to moderate central narrowing T10-11 and T11-12.  Minimal degenerative  anterolisthesis of T10 on T11 also contributing to stenosis.  Moderate foraminal narrowing T9-10, T10-11 and T11-12. 2. Normal thoracic cord.  The portion behind T12 and L1 can not be adequately evaluated secondary to artifact from fusion hardware. 3. Posterior fluid collection along the multilevel laminectomy at site of fusion in the lumbar region seen at the edge of the field of view.  Finding may represent a chronic seroma or a pseudomeningocele.     Dictated by (CST): Sulaiman Marshall MD on 12/26/2024 at 11:11 PM     Finalized by (CST): Sulaiman Marshall MD on 12/26/2024 at 11:27 PM          MRI SPINE CERVICAL (CPT=72141)    Result Date: 12/26/2024  CONCLUSION:  1. Multilevel moderate to advanced degenerative disc disease/spondylosis with multilevel central and foraminal narrowing.  Moderate central narrowing C6-7.  Mild-to-moderate central narrowing C3-4.  Multilevel moderate to severe foraminal narrowing. 2. Large left thyroid goiter with intrathoracic extension.     Dictated by (CST): Sulaiman Marshall MD on 12/26/2024 at 11:02 PM     Finalized by (CST): Sulaiman Marshall MD on 12/26/2024 at 11:11 PM          US VENOUS DOPPLER LEG BILAT - DIAG IMG (CPT=93970)    Result Date: 12/24/2024  CONCLUSION:   No DVT in either lower extremity.     Dictated by (CST): Keegan Santana MD on 12/24/2024 at 8:47 PM     Finalized by (CST): Keegan Santana MD on 12/24/2024 at 8:48 PM          CT SPINE LUMBAR (CPT=72131)    Result Date: 12/24/2024  CONCLUSION:   Extensive pedicle screw and posterior fusion hardware seen throughout the entire lumbar spine.  Disc replacement at L5-S1 demonstrate slight anterior positioning which is nonspecific.  Correlation with prior imaging and operative report recommended.  Remainder of the surgical hardware is otherwise intact.  No high-grade canal or foraminal narrowing given limitations from streak artifact from patient's hardware.  No acute fracture or vertebral body height loss.     Dictated by (CST):  Keegan Santana MD on 12/24/2024 at 7:53 PM     Finalized by (CST): Keegan Santana MD on 12/24/2024 at 7:57 PM          CT CHEST PE AORTA (IV ONLY) (CPT=71260)    Result Date: 12/24/2024  CONCLUSION:   No pulmonary embolus in the central, main, lobar, segmental pulmonary arteries. Nondiagnostic in the subsegmental pulmonary arteries due to respiratory motion artifact.  Mosaic attenuation of the lungs bilaterally suggestive of air trapping which can be seen with small vessel or small airways disease.  Bronchial wall thickening suggestive of mild chronic airway inflammation.   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    Dictated by (CST): Keegan Santana MD on 12/24/2024 at 7:43 PM     Finalized by (CST): Keegan Santana MD on 12/24/2024 at 7:46 PM          XR ANKLE (MIN 3 VIEWS), RIGHT (CPT=73610)    Result Date: 12/24/2024  CONCLUSION:  1. No fracture or dislocation. 2. Partially imaged postoperative changes related to a remote bunionectomy. 3. Stable mild talonavicular joint osteoarthritis. 4. Sclerotic lesions in the calcaneal body are unchanged from 2015 consistent with benign/nonaggressive lesions such as bone islands.    Dictated by (CST): Alfonzo Zheng MD on 12/24/2024 at 4:35 PM     Finalized by (CST): Alfonzo Zheng MD on 12/24/2024 at 4:37 PM          XR PELVIS (1 VIEW) (CPT=72170)    Result Date: 12/24/2024  CONCLUSION:  1. No fracture or dislocation. 2. Mild bilateral hip joint osteoarthritis. 3. Partially imaged postoperative changes related to a multilevel lumbar laminectomy and posterior instrumented fusion with interbody fusion at L5-S1 and bilateral sacroiliac joint fusion.    Dictated by (CST): Alfonzo Zheng MD on 12/24/2024 at 4:33 PM     Finalized by (CST): Alfonzo Zheng MD on 12/24/2024 at 4:34 PM          XR CHEST AP PORTABLE  (CPT=71045)    Result Date: 12/24/2024  CONCLUSION:  No acute cardiopulmonary process.    Dictated by (CST):  Alfonzo Zheng MD on 12/24/2024 at 4:32 PM     Finalized by (CST): Alfonzo Zheng MD on 12/24/2024 at 4:33 PM           LABS :     Lab Results   Component Value Date    WBC 16.1 (H) 12/27/2024    HGB 12.6 12/27/2024    HCT 37.1 12/27/2024    .0 12/27/2024    CREATSERUM 0.88 12/27/2024    BUN 24 (H) 12/27/2024     12/27/2024    K 3.6 12/27/2024     12/27/2024    CO2 31.0 12/27/2024     (H) 12/27/2024    CA 9.4 12/27/2024    ALB 4.6 12/24/2024    ALKPHO 68 12/24/2024    BILT 1.1 12/24/2024    TP 7.3 12/24/2024    AST 53 (H) 12/24/2024    ALT 33 12/24/2024    PTT 40.5 (H) 09/12/2017    INR 1.1 09/12/2017    T4F 1.1 12/24/2024    TSH 0.201 (L) 12/24/2024    DDIMER 1.48 (H) 12/24/2024    MG 2.2 12/24/2024    TROP 0.00 09/12/2017     (H) 12/24/2024       Recent Labs   Lab 12/25/24  0519 12/26/24  0547 12/27/24  0724   RBC 4.32 4.38 4.18   HGB 13.6 13.3 12.6   HCT 39.7 39.0 37.1   MCV 91.9 89.0 88.8   MCH 31.5 30.4 30.1   MCHC 34.3 34.1 34.0   RDW 14.2 14.0 14.2   NEPRELIM 4.87 14.46* 10.79*   WBC 6.2 17.1* 16.1*   .0 249.0 251.0     Recent Labs   Lab 12/24/24  1501 12/25/24  0539 12/26/24  0547 12/27/24  0724   GLU 99 180* 182* 112*   BUN 15 15 20 24*   CREATSERUM 0.97 1.00 0.83 0.88   CA 9.9 10.1 9.8 9.4   ALB 4.6  --   --   --     139 142 141   K 4.8 3.9 3.9 3.6    101 105 104   CO2 31.0 30.0 30.0 31.0   ALKPHO 68  --   --   --    AST 53*  --   --   --    ALT 33  --   --   --    BILT 1.1  --   --   --    TP 7.3  --   --   --      Lab Results   Component Value Date    INR 1.1 09/12/2017    INR 1.0 09/12/2017       Disposition: Discharge to Home    Condition at Discharge: Stable     Discharge Medications:      Discharge Medications        START taking these medications        Instructions Prescription details   cefadroxil 500 MG Caps  Commonly known as: DURICEF      Take 1 capsule (500 mg total) by mouth 2 (two) times daily for 3 days.   Stop taking on: December  29, 2024  Quantity: 6 capsule  Refills: 0            CHANGE how you take these medications        Instructions Prescription details   traMADol 50 MG Tabs  Commonly known as: Ultram  What changed: when to take this      Take 1 tablet (50 mg total) by mouth every 6 (six) hours as needed.   Quantity: 10 tablet  Refills: 0            CONTINUE taking these medications        Instructions Prescription details   aspirin 81 MG Tbec      Take 2 tablets (162 mg total) by mouth daily.   Refills: 0     atorvastatin 40 MG Tabs  Commonly known as: Lipitor      Take 1 tablet (40 mg total) by mouth nightly. Avoid grapefruit juice.   Refills: 0     ibuprofen 200 MG Tabs  Commonly known as: Motrin      Take 3 tablets (600 mg total) by mouth daily as needed for Pain.   Refills: 0     One-Daily Multi Vitamins Tabs      Take 1 tablet by mouth daily.   Refills: 0               Where to Get Your Medications        These medications were sent to [facility]/pharmacy #[id number] - [place], [place] - [address]. [phone], [phone]  [address] [place] [address]., [facility] [address]      Phone: [phone]   cefadroxil 500 MG Caps  traMADol 50 MG Tabs         Follow up Visits  Gonzalo Kelley MD  [address] [address] [phone]    Call      Drew Perez MD  [address] [place] [address] [id number] [phone]    Call      No primary care provider on file.    Consultants         Provider   Role Specialty     Gonzalo Kelley MD      Consulting Physician NEUROLOGY              Other Discharge Instructions:       ----------------------------------------------------  35 MIN SPENT ON THIS DC   Adrien Guerra MD    12/27/2024

## 2024-12-27 NOTE — PLAN OF CARE
Problem: Patient Centered Care  Goal: Patient preferences are identified and integrated in the patient's plan of care  Description: Interventions:  - What would you like us to know as we care for you? I am from home with my daughter  - Provide timely, complete, and accurate information to patient/family  - Incorporate patient and family knowledge, values, beliefs, and cultural backgrounds into the planning and delivery of care  - Encourage patient/family to participate in care and decision-making at the level they choose  - Honor patient and family perspectives and choices  Outcome: Progressing     Problem: Patient/Family Goals  Goal: Patient/Family Long Term Goal  Description: Patient's Long Term Goal: Discharge    Interventions:  - Monitor vitals  - Monitor appropriate labs  - Administer medications as ordered  - Follow MD's orders  - Update patient on plan of care   - Discharge planning     - See additional Care Plan goals for specific interventions  Outcome: Progressing  Goal: Patient/Family Short Term Goal  Description: Patient's Short Term Goal:     Interventions:   -   - See additional Care Plan goals for specific interventions  Outcome: Progressing     Problem: Impaired Functional Mobility  Goal: Achieve highest/safest level of mobility/gait  Description: Interventions:  - Assess patient's functional ability and stability  - Promote increasing activity/tolerance for mobility and gait  - Educate and engage patient/family in tolerated activity level and precautions    Outcome: Progressing     Problem: PAIN - ADULT  Goal: Verbalizes/displays adequate comfort level or patient's stated pain goal  Description: INTERVENTIONS:  - Encourage pt to monitor pain and request assistance  - Assess pain using appropriate pain scale  - Administer analgesics based on type and severity of pain and evaluate response  - Implement non-pharmacological measures as appropriate and evaluate response  - Consider cultural and social  influences on pain and pain management  - Manage/alleviate anxiety  - Utilize distraction and/or relaxation techniques  - Monitor for opioid side effects  - Notify MD/LIP if interventions unsuccessful or patient reports new pain  - Anticipate increased pain with activity and pre-medicate as appropriate  Outcome: Progressing     Problem: RISK FOR INFECTION - ADULT  Goal: Absence of fever/infection during anticipated neutropenic period  Description: INTERVENTIONS  - Monitor WBC  - Administer growth factors as ordered  - Implement neutropenic guidelines  Outcome: Progressing     Problem: SAFETY ADULT - FALL  Goal: Free from fall injury  Description: INTERVENTIONS:  - Assess pt frequently for physical needs  - Identify cognitive and physical deficits and behaviors that affect risk of falls.  - [place] fall precautions as indicated by assessment.  - Educate pt/family on patient safety including physical limitations  - Instruct pt to call for assistance with activity based on assessment  - Modify environment to reduce risk of injury  - Provide assistive devices as appropriate  - Consider OT/PT consult to assist with strengthening/mobility  - Encourage toileting schedule  Outcome: Progressing

## 2024-12-27 NOTE — PLAN OF CARE
Melisa is cleared for discharge. AVS reviewed at bedside. Transported home via private vehicle.     Problem: Patient Centered Care  Goal: Patient preferences are identified and integrated in the patient's plan of care  Description: Interventions:  - What would you like us to know as we care for you? I am from home with my daughter  - Provide timely, complete, and accurate information to patient/family  - Incorporate patient and family knowledge, values, beliefs, and cultural backgrounds into the planning and delivery of care  - Encourage patient/family to participate in care and decision-making at the level they choose  - Honor patient and family perspectives and choices  Outcome: Adequate for Discharge     Problem: Patient/Family Goals  Goal: Patient/Family Long Term Goal  Description: Patient's Long Term Goal: Discharge    Interventions:  - Monitor vitals  - Monitor appropriate labs  - Administer medications as ordered  - Follow MD's orders  - Update patient on plan of care   - Discharge planning     - See additional Care Plan goals for specific interventions  Outcome: Adequate for Discharge  Goal: Patient/Family Short Term Goal  Description: Patient's Short Term Goal:     Interventions:   -   - See additional Care Plan goals for specific interventions  Outcome: Adequate for Discharge     Problem: Impaired Functional Mobility  Goal: Achieve highest/safest level of mobility/gait  Description: Interventions:  - Assess patient's functional ability and stability  - Promote increasing activity/tolerance for mobility and gait  - Educate and engage patient/family in tolerated activity level and precautions    Outcome: Adequate for Discharge     Problem: PAIN - ADULT  Goal: Verbalizes/displays adequate comfort level or patient's stated pain goal  Description: INTERVENTIONS:  - Encourage pt to monitor pain and request assistance  - Assess pain using appropriate pain scale  - Administer analgesics based on type and  severity of pain and evaluate response  - Implement non-pharmacological measures as appropriate and evaluate response  - Consider cultural and social influences on pain and pain management  - Manage/alleviate anxiety  - Utilize distraction and/or relaxation techniques  - Monitor for opioid side effects  - Notify MD/LIP if interventions unsuccessful or patient reports new pain  - Anticipate increased pain with activity and pre-medicate as appropriate  Outcome: Adequate for Discharge     Problem: RISK FOR INFECTION - ADULT  Goal: Absence of fever/infection during anticipated neutropenic period  Description: INTERVENTIONS  - Monitor WBC  - Administer growth factors as ordered  - Implement neutropenic guidelines  Outcome: Adequate for Discharge     Problem: SAFETY ADULT - FALL  Goal: Free from fall injury  Description: INTERVENTIONS:  - Assess pt frequently for physical needs  - Identify cognitive and physical deficits and behaviors that affect risk of falls.  - [place] fall precautions as indicated by assessment.  - Educate pt/family on patient safety including physical limitations  - Instruct pt to call for assistance with activity based on assessment  - Modify environment to reduce risk of injury  - Provide assistive devices as appropriate  - Consider OT/PT consult to assist with strengthening/mobility  - Encourage toileting schedule  Outcome: Adequate for Discharge

## 2025-01-16 ENCOUNTER — APPOINTMENT (OUTPATIENT)
Dept: PHYSICAL THERAPY | Age: 75
End: 2025-01-16
Payer: MEDICARE

## 2025-01-22 ENCOUNTER — APPOINTMENT (OUTPATIENT)
Dept: PHYSICAL THERAPY | Age: 75
End: 2025-01-22
Attending: PODIATRIST
Payer: MEDICARE

## 2025-01-27 ENCOUNTER — OFFICE VISIT (OUTPATIENT)
Dept: PHYSICAL THERAPY | Age: 75
End: 2025-01-27
Attending: PODIATRIST
Payer: MEDICARE

## 2025-01-27 PROCEDURE — 97110 THERAPEUTIC EXERCISES: CPT | Performed by: PHYSICAL THERAPIST

## 2025-01-27 NOTE — PROGRESS NOTES
Discharge Summary  Pt has attended 8 visits in Physical Therapy.     Diagnosis:   R ankle sprain Referring Provider:  Willem Ramos DPM  Date of Evaluation:    10/30/2024    Precautions:  fall risk, chronic LBP/LE Next MD visit:   12//21/2024  Date of Surgery: n/a   Insurance Primary/Secondary: MEDICARE / [employer]     # Auth Visits: POC every 10 visits            Subjective:   Was admitted to hospital 12/24-27/2024 for LE weakness, some type of infection.  They wanted to keep me longer, past 1/2025. Treated with antibiotic which helped but also had MRI, to f/u with neurology due to lumbar hx and LE weakness, advised to elevate LE when they are bloated/to stay off of them.  My LE are less bloated and skinny in the mornings. Had f/u with podiatrist 12/12/2024: to discontinue brace, call him if any additional issues. R ankle is back to lilo, but c/o LE weakness at times. Have not worn R ankle brace since saw podiatrits 12/12/2024.   Pain:  40/10        Objective:     Gait:  WFL, R LE ER, slow keyla, no assistive device, no ankle orthosis      AROM (degrees): (* denotes performed with pain)  Foot/Ankle   DF: R 0; L 5  PF: R 50; L 50  INV: R 20; L 20  EV: R 15; L 30  Restriction B feet digit flex/ext       Flexibility:  Gastroc-soleus: R > L restriction    Strength/MMT: (* denotes performed with pain)  Foot/Ankle   DF: R 5/5; L 5/5  PF: R/L each 2/5, c/o weakness  INV: R 5/5; L 5/5  EV: R 5/5; L 5/5     Stairs: pt negotiates 6 inch stairs reciprocally; pt c/o R LE weakness.  Pt reports negotiating stairs daily at home.   Balance: SLS: R/L each ~ 1 sec - pt c/o R hip pain      Assessment:   Pt returns to PT today after last attending 12/4/2024. Pt reports R ankle c/o have resolved, no longer using R ankle brace.  She c/o LE weakness possibly related to lumbar spine, wishes to resume PT for her lumbar spine.  Hep reviewed to address residual deficits- ankld ROM, gastroc tightness, ankle weakness,  balance deficit.    Goals:    (to be met in 8-10 visits)  Patient to report independence with PT HEP to facilitate self management. - MET  Patient to have at least L/R ankle DF to at least neutral to facilitate toe clearance/normal gait mechanics. - MET  Patient to complete L/R SLS each at least 3 sec in order to reduce risk for ankle instability. - PROGRESS  Patient to walk/stand each at least 5 min unlimited by R ankle c/o. - MET  Patient to have R ankle eversion MMT 5/5 in order to reduce risk for R ankle instability. - MET  Patient to reciprocally negotiate stairs unlimited by R ankle. - MET    Plan: Discharge pt to Hep to due pt reporting c/o related to R ankle sprain have resolved, I c HEP. Pt to f/u with DPM/physician for any c/o/concerns.   Date:  11/11/2024               TX#: 3/6-10 - Pt arrived ~ 15 min late.  Date:     11/13/2024            TX#: 4/6-10 - Pt arrived ~ 10 min late. Date:   11/18/2024            TX#: 5/6-10 Date: 11/25/2024  Tx#: 6/10-12 Date: 12/4/2024  Tx #: 7/10-12 - Pt arrived ~ 15 min late.  Date: 1/27/2025  Tx #: 8/10-12   There Ex:   R ankle inversion and eversion using YTB x 20 reps each  Seated B heel/toe raises x 20 reps  Seated R ankle circles using round board x 10 reps each clockwise/counterclockwise  Standing L/R hip abd 2 x  10 reps    There Ex:   R ankle circles x 10 reps each clockwise/counterclockwise  R ankle inversion and eversion using YTB x 20 reps each  Standing B heel/toe raises 2 x 10 reps  Standing L/R gastroc stretch 2 x 20 sec   There Ex:   R ankle alphabet x 1   R ankle inversion and eversion using RTB x 20 reps each  Standing B heel/toe raises 2 x 10 reps  B gastroc stretch on slant board 2 x 30 sec each  4 inch step up x 5 reps R LE, up over down using R LE x 5 reps  There Ex:   R ankle alphabet x 1   R ankle inversion and eversion using RTB x 20 reps each  Seated hamstrings/gastroc stretch for c/o cramps  when sitting  Standing B heel/toe raises 2 x 10 reps  (HEP)  B gastroc stretch on slant board 2 x 30 sec each  Mini staircase negotiation (4, ~ 6 inch steps) x 1   Seated R ankle AROM rockerboard taps PF/DF and inversion/eversion x 10 reps each, pt declined standing due to reporting increased ankle c/o  HEP review; see below.    There Ex:   HEP review; see below.   Gastroc stretch slant board 2 x 20 sec each   L/R SLS with contralateral hip flex-ext, abd x ~ 5 reps each (HEP alternative to marching, per pt inquiry) There Ex:   HEP review; see below.  ankle AROM PF/DF, inversion/eversion, or ankle circles, standing heel/toe raises,  standing gastroc stretch, tandem balance with/without arm sweeps at countertop - discontinue, SLS with contralateral hip flex/abd/ext at countertop    Mini squat x 10 reps - to increase LE strength with stairs   Manual:   Grade II R talocrural ant/post glides   PROM R ankle PF, DF, inversion, eversion  R gastroc stretches 3 x 20 sec each  Manual:   Grade II R talocrural ant/post glides   PROM R ankle PF, DF, inversion, eversion  R gastroc stretches 3 x 20 sec each   R foot MTP/IP flex/ext PROM Manual:   Grade II R talocrural ant/post glides   PROM R ankle PF, DF, inversion, eversion  R gastroc stretches 3 x 20 sec each   R foot MTP/IP flex/ext PROM Manual:   Grade II R talocrural ant/post glides   PROM R ankle PF, DF, inversion, eversion  R gastroc stretches 3 x 20 sec each   R foot MTP/IP flex/ext PROM  Rhythmic stabilization R ankle x ~30 sec Manual:   Grade II R talocrural ant/post glides   PROM R ankle PF, DF, inversion, eversion  R gastroc stretches 3 x 20 sec each   R foot MTP/IP flex/ext PROM    NM RE:  Tandem balance x 20 sec each (HEP) NM RE:   AirEx: Alternate L/R LE marching 10 x 3 sec each NM RE:   Gait training: cuing for heel to toe gait pattern, R LE neutral (avoid excessive ER)  Heel to toe walking forward/backward 2 x ~ 16 feet   Marching (pt reports completing at home, advised to hold a few seconds): 5 x 5 sec   AirEx:  Alternate L/R LE marching 10 x 3 sec each NM RE:  Heel to toe walking forward/backward 2 x ~ 16 feet    AirEx: Alternate L/R LE marching 10 x 3 sec each    TA: Brief review of computer ergonomics, per pt inquiry NM RE:  Rockerboard taps: ant/post and med/lat x ~ 15 reps each, balance x ~ 45 sec  Heel to toe walking forward/backward 3 x ~ 16 feet    AirEx: Alternate L/R LE marching 10 x 3 sec each        TA:   Stitting ankle exercises in effort to reduce c/o; may consider seated heel/toe raises, ankle circles, walk around, limit sitting time    HEP: ankle AROM PF/DF, inversion/eversion, or ankle circles, standing heel/toe raises,  standing gastroc stretch, tandem balance with/without arm sweeps at countertop - discontinue, contralateral hip flex/abd/ext    Charges: 2 TE (25 min) Total Timed Treatment:  25 min  Total Treatment Time: 40min  Insufficient time to administer LEFS; will attempt to send to pt via [employer]    Plan:  See Plan above.     Patient/Family/Caregiver was advised of these findings, precautions, and treatment options and has agreed to actively participate in planning and for this course of care.    Thank you for your referral. If you have any questions, please contact me at Dept: [phone].    Sincerely,  Electronically signed by therapist: Kalina Beckham, PT     Physician's certification required:  Yes  Please co-sign or sign and return this letter via fax as soon as possible to [phone].   I certify the need for these services furnished under this plan of treatment and while under my care.    X___________________________________________________ Date____________________    Certification From: 1/27/2025  To:4/27/2025

## 2025-01-29 ENCOUNTER — TELEPHONE (OUTPATIENT)
Dept: PHYSICAL THERAPY | Facility: HOSPITAL | Age: 75
End: 2025-01-29

## 2025-01-29 ENCOUNTER — OFFICE VISIT (OUTPATIENT)
Dept: PHYSICAL THERAPY | Age: 75
End: 2025-01-29
Payer: MEDICARE

## 2025-01-29 DIAGNOSIS — M62.830 MUSCLE SPASM OF BACK: ICD-10-CM

## 2025-01-29 DIAGNOSIS — R26.2 DIFFICULTY WALKING: ICD-10-CM

## 2025-01-29 DIAGNOSIS — M54.50 LOW BACK PAIN: Primary | ICD-10-CM

## 2025-01-29 PROCEDURE — 97110 THERAPEUTIC EXERCISES: CPT | Performed by: PHYSICAL THERAPIST

## 2025-01-29 PROCEDURE — 97162 PT EVAL MOD COMPLEX 30 MIN: CPT | Performed by: PHYSICAL THERAPIST

## 2025-01-29 NOTE — PROGRESS NOTES
SPINE EVALUATION:     Diagnosis:   Low back pain (M54.50)  Muscle spasm of back (M62.830)  Difficulty walking (R26.2) Patient:  Melisa Agosto (74 year old, female)        Referring Provider: Annamaria Sánchez  Today's Date   1/29/2025    Precautions:  Fall Risk (osteopenia, CAD)   Date of Evaluation: 01/29/25 (Pt arrived ~ 15 min late.)  Next MD visit: TBD  Date of Surgery: laminectomy ~ 2017, fusion ~ 2019     PATIENT SUMMARY   Summary of chief complaints: chronic LBP, hx of 2 lumbar surgeries (laminectomy, fusion ~ 2019). Pt reports chronic back and LE pain, weakness.  She currently c/o R ankle and L knee pain. Pt reports almost cancelled visit today due to R LBP.  Pt referred to PT 10/2024 but delayed due to participating in PT for R ankle sprain. Pt f/u with PCP 12/2024, advised c/o related to spinal stenosis, pain medication changed, advised to continue with PT. Pt reports some reduction in c/o with pain pills (Tramadol). She c/o L knee pain driving (keeps knee bent), R ankle pain with moving it, LBP > ankle pain with sleeping, c/o LBP with sitting.  Pt reports walking is challenging, unable to stand > 10-15 min. Pt reports feels frustrated, feels like getting worse. She denies f/u with spine specialist.  History of current condition: chronic, lubmar surgery x 2   Pain level: current 0 /10 (present), at best  , at worst    Description of symptoms:     Occupation: Retired   Leisure activities/Hobbies:     Prior level of function:    Current limitations:    Pt goals:    Past medical history was reviewed with Melisa.  Significant findings include:    Imaging/Tests:     Melisa  has a past medical history of Back problem, Coronary atherosclerosis, Diverticulosis large intestine w/o perforation or abscess w/o bleeding (1/4/2019), High cholesterol, History of heart artery stent (09/2017), repair of rotator cuff (2010), Internal hemorrhoids without complication (1/4/2019), and S/P colonoscopic polypectomy  (1/4/2019).  She  has a past surgical history that includes foot surgery; back surgery (2006); anesth,surgery of shoulder; other surgical history (2003); other surgical history (1987); bso, omentectomy w/freedom (1985); hysterectomy (1989); and colonoscopy (N/A, 1/4/2019).    ASSESSMENT  Melisa presents to physical therapy evaluation with primary c/o chronic LBP, hx of 2 lumbar surgeries (laminectomy, fusion ~ 2019). Pt reports chronic back and LE pain, weakness.  She currently c/o R ankle and L knee pain. Pt reports almost cancelled visit today due to R LBP.  Pt referred to PT 10/2024 but delayed due to participating in PT for R ankle sprain. Pt f/u with PCP 12/2024, advised c/o related to spinal stenosis, pain medication changed, advised to continue with PT. Pt reports some reduction in c/o with pain pills (Tramadol). She c/o L knee pain driving (keeps knee bent), R ankle pain with moving it, LBP > ankle pain with sleeping, c/o LBP with sitting.  Pt reports walking is challenging, unable to stand > 10-15 min. Pt reports feels frustrated, feels like getting worse. She denies f/u with spine specialist.. The results of the objective tests and measures show  . Functional deficits include but are not limited to  . Signs and symptoms are consistent with diagnosis of Low back pain (M54.50)  Muscle spasm of back (M62.830)  Difficulty walking (R26.2). Pt and PT discussed evaluation findings, pathology, POC and HEP.  Pt voiced understanding and performs HEP correctly without reported pain. Skilled Physical Therapy is medically necessary to address the above impairments and reach functional goals.    OBJECTIVE:   Musculoskeletal:  Observation/Posture: Standing: L iliac crest elevated vs R         (* denotes performed with pain)  Trunk ROM     Flex WFL*     Ext neutral*    R L     Side bend hand to mid thigh* hand to mid thigh*     Rotation 50 deg* 50 deg*   ,   Hip   ROM MMT (-/5)    R L R L     Flex (L2) WFL WFL 4- 4-      Ext              Abd WFL WFL         ER WFL WFL 4 4     IR             ,   Knee   ROM MMT (-/5)    R L R L     Flex     4 4     Ext (L3)     4 4     ,   Ankle/Foot   ROM MMT (-/5)    R L R L     PF             DF (L4) 4+ 4+         Inversion             Eversion             Grt Toe Ext (L5)               Neurological:    Peripheral Neurodynamic: WNL except Negative L/R SLUMP; L/R LE dermatomes intact to light touch except L/R L4 and L5 dermatomes     Balance and Functional Mobility:  Gait: pt ambulates on level ground with other (use comment) (forward rounded posture, reduce ankle/hip excursion, wide base of support, slow cacence).          Today's Treatment and Response:   Pt education was provided on exam findings, treatment diagnosis, treatment plan, expectations, and prognosis.  Today's Treatment       1/29/2025   Treatment   Therapeutic Exercise Bridge x 5 reps    Supine LTR 3 x 5 sec each    Seated cat cow x 5 reps    Seated LAQ/sciatic nerve glides x 5 reps     Therapeutic Exercise Min 10   Total of Timed Procedures 10   Total of Service Based 0   Total Treatment Time 10         Patient was instructed in and issued a HEP for: Bridge, LTR, seated cat/cow, seated LAQ/sciatic nerve glides    Charges:  PT EVAL: Moderate Complexity, 1 TE  In agreement with evaluation findings and clinical rationale, this evaluation involved MODERATE COMPLEXITY decision making due to 1-2 personal factors/comorbidities, 3 or more body structures involved/activity limitations, and evolving symptoms as documented in the evaluation.                                                                         PLAN OF CARE:    Goals: (to be met in 10 visits)   Goals       Therapy Goals     Patient to report independence with HEP to facilitate self management.  Patient to have L/R LE MMT each at least 4+/5 to facilitate transfers, stair negotiation.   Patient to have lumbar spine AROM grossly WFL to facilitate transfers.   Patient to  walk/stand each at least 5 min.   Patient to use appropriate assistive device for improved safety with ambulation/to reduce fall risk.                Frequency / Duration: Patient will be seen 1-2x/week or a total of -2  visits over a 90 day period. Treatment will include: Manual Therapy; Gait training; Neuromuscular Re-education; Self-Care Home Management; Therapeutic Activities; Therapeutic Exercise; Home Exercise Program instruction    Education or treatment limitation: None (attendance, tardy arrival)   Rehab Potential: fair (given hx of chronic LBP, lumbar surgery x 2)     Oswestry Disability Index Score  Score: 74 % (6/11/2024 11:53 AM); initiated SHANEL again today; insufficient time to complete; will attempt next session      Patient/Family/Caregiver was advised of these findings, precautions, and treatment options and has agreed to actively participate in planning and for this course of care.    Thank you for your referral. Please co-sign or sign and return this letter via fax as soon as possible to [phone]. If you have any questions, please contact me at Dept: [phone]    Sincerely,  Electronically signed by therapist: Kalina Beckham PT  Physician's certification required: Yes  I certify the need for these services furnished under this plan of treatment and while under my care.    X___________________________________________________ Date____________________    Certification From: 1/29/2025  To:4/29/2025

## 2025-02-03 ENCOUNTER — TELEPHONE (OUTPATIENT)
Dept: PHYSICAL THERAPY | Age: 75
End: 2025-02-03

## 2025-02-04 ENCOUNTER — TELEPHONE (OUTPATIENT)
Dept: PHYSICAL THERAPY | Age: 75
End: 2025-02-04

## 2025-02-12 ENCOUNTER — APPOINTMENT (OUTPATIENT)
Dept: PHYSICAL THERAPY | Age: 75
End: 2025-02-12
Payer: MEDICARE

## 2025-02-12 ENCOUNTER — APPOINTMENT (OUTPATIENT)
Dept: PHYSICAL THERAPY | Age: 75
End: 2025-02-12
Attending: PODIATRIST
Payer: MEDICARE

## 2025-02-19 ENCOUNTER — TELEPHONE (OUTPATIENT)
Dept: PHYSICAL THERAPY | Age: 75
End: 2025-02-19

## 2025-02-19 NOTE — TELEPHONE ENCOUNTER
Called pt re: no show to today's PT appointment. Pt stated that she cancelled.  Confirmed next pt appointment. Requested that pt call to reschedule if unable to attend. Pt expressed understanding.

## 2025-02-26 ENCOUNTER — OFFICE VISIT (OUTPATIENT)
Dept: PHYSICAL THERAPY | Age: 75
End: 2025-02-26
Payer: MEDICARE

## 2025-02-26 PROCEDURE — 97530 THERAPEUTIC ACTIVITIES: CPT | Performed by: PHYSICAL THERAPIST

## 2025-02-26 PROCEDURE — 97110 THERAPEUTIC EXERCISES: CPT | Performed by: PHYSICAL THERAPIST

## 2025-02-26 NOTE — PROGRESS NOTES
Patient: Melisa Agosto (74 year old, female) Referring Provider:  Insurance:   Diagnosis: Low back pain (M54.50)  Muscle spasm of back (M62.830)  Difficulty walking (R26.2) Physician Nonstaff  MEDICARE   Date of Surgery: laminectomy ~ 2017, fusion ~ 2019 Next MD visit:  [facility]   Precautions:  Fall Risk (osteopenia, CAD) TBD Referral Information:    Date of Evaluation: Req: 0, Auth: 0, Exp:     01/29/25 (Pt arrived ~ 15 min late.) POC Auth Visits:    10      Today's Date   2/26/2025    Subjective  C/o LB fatigue and pain limiting abiltiy to stand to cook meals. Feel tired/fatigue by the time the afternoon rolls around. Completing HEP. R ankle is fine now.  I am walking slower than I use to.       Pain: 8/10 (10/10 pain before taking Tramadol)     Objective  Gait: slow keyla, forward rounded, no assistive device         Assessment  Pt returns to PT after last attending 1/29/2025. Pt reports HEP compliance, continued c/o chronic LBP, denies LE c/o. Pt with c/o suggestive of endurance issues. Progressing interventions, encoouraged walking in home in effort to address. Pt toleraeted sesision well. Pt wtih several PT appointment cancellations/no show recently.    Goals (to be met in 10 visits)   Goals       Therapy Goals     Patient to report independence with HEP to facilitate self management.  Patient to have L/R LE MMT each at least 4+/5 to facilitate transfers, stair negotiation.   Patient to have lumbar spine AROM grossly WFL to facilitate transfers.   Patient to walk/stand each at least 5 min.   Patient to use appropriate assistive device for improved safety with ambulation/to reduce fall risk.               Plan  Continue PT 1-2 x weekly up to 10 visits. Progress gait, endurance, balance.    Treatment Last 4 Visits       1/29/2025 2/26/2025   PT Treatment   Treatment Day  2       Pt arrived ~ 15 min late to PT session.   Therapeutic Exercise Bridge x 5 reps    Supine LTR 3 x 5 sec  each    Seated cat cow x 5 reps    Seated LAQ/sciatic nerve glides x 5 reps   HEP verbal review; see HEP section.    Recumbent stationary bike: level 1 x 3 min    Sit - stand from mid height plinth x 10 reps    Standing alternate L/R hip abd x ~ 12 reps    Standing B heel - toe raises x ~ 15 reps     Seated 3 way spine stretch x 15 sec each    Bridge x 4 reps - c/o hamstrings cramps, resolved with supine PT A hamstrings stretches x 20 sec; modified to using ball x ~ 6 reps   Therapeutic Activity  Pt education re: Avoid being sedenatary for periods of time, walk around interior of home    Gait training with/without SPC; possible increased speed with SPC, pt reported felt unsteady with SPC use. Cuing for heel - toe gait pattern    Therapeutic Exercise Min 10 30   Therapeutic Activity Min  10   Total of Timed Procedures 10 40   Total of Service Based 0 0   Total Treatment Time 10 40         HEP    Bridge x 5 reps    Supine LTR 3 x 5 sec each    Seated cat cow x 5 reps    Seated LAQ/sciatic nerve glides x 5 reps        Charges     2 TE, 1 TA

## 2025-03-05 ENCOUNTER — TELEPHONE (OUTPATIENT)
Dept: PHYSICAL THERAPY | Facility: HOSPITAL | Age: 75
End: 2025-03-05

## 2025-03-05 ENCOUNTER — APPOINTMENT (OUTPATIENT)
Dept: PHYSICAL THERAPY | Age: 75
End: 2025-03-05
Payer: MEDICARE

## 2025-03-12 ENCOUNTER — OFFICE VISIT (OUTPATIENT)
Dept: PHYSICAL THERAPY | Age: 75
End: 2025-03-12
Payer: MEDICARE

## 2025-03-12 PROCEDURE — 97110 THERAPEUTIC EXERCISES: CPT | Performed by: PHYSICAL THERAPIST

## 2025-03-12 NOTE — PROGRESS NOTES
Patient: Melisa Agosto (74 year old, female) Referring Provider:  Insurance:   Diagnosis: Low back pain (M54.50)  Muscle spasm of back (M62.830)  Difficulty walking (R26.2) Physician Nonstaff  MEDICARE   Date of Surgery: laminectomy ~ 2017, fusion ~ 2019 Next MD visit:  [facility]   Precautions:  Fall Risk (osteopenia, CAD) TBD Referral Information:    Date of Evaluation: Req: 0, Auth: 0, Exp:     01/29/25 (Pt arrived ~ 15 min late.) POC Auth Visits:  10       Today's Date   3/12/2025    Subjective  c/o endurance issues. C/o tingling in feet. 7/10 LBP ( reduced after Tramadol). See PCP Saturday.       Pain: 7/10     Objective  Standing: forward rounded, gluteal atrophy. Gait: slow keyla, downward gaze, no assistive device          Assessment  Pt has attended ~ 3 PT sessions since initiating PT 1/29/2025, frequent tardy arrival, no show/cancellation. Progressing interventions to address balance, strength, spine ROM deficits. Encouraged HEP compliance as well tolerated and SPC use for safety/to reduce fall risk/improve keyla. Pt challenged but tolerated session well. Pt expressed concerns re: Tramadol use, prescribed by PCP; advised pt to discuss with PCP.     Goals (to be met in 10 visits)   Therapy Goals    Patient to report independence with HEP to facilitate self management.  Patient to have L/R LE MMT each at least 4+/5 to facilitate transfers, stair negotiation.   Patient to have lumbar spine AROM grossly WFL to facilitate transfers.   Patient to walk/stand each at least 5 min.   Patient to use appropriate assistive device for improved safety with ambulation/to reduce fall risk.           Plan  Continue PT 1-2 x weekly for up to 10 sessions. Endurance, balance, spine ROM/stablization, LE strength. F/u on PCP appt.     Treatment Last 4 Visits       1/29/2025 2/26/2025 3/12/2025   PT Treatment   Treatment Day  2       Pt arrived ~ 15 min late to PT session. 3       Pt arrived to PT appointment ~  15 min late.   Therapeutic Exercise Bridge x 5 reps    Supine LTR 3 x 5 sec each    Seated cat cow x 5 reps    Seated LAQ/sciatic nerve glides x 5 reps   HEP verbal review; see HEP section.    Recumbent stationary bike: level 1 x 3 min    Sit - stand from mid height plinth x 10 reps    Standing alternate L/R hip abd x ~ 12 reps    Standing B heel - toe raises x ~ 15 reps     Seated 3 way spine stretch x 15 sec each    Bridge x 4 reps - c/o hamstrings cramps, resolved with supine PT A hamstrings stretches x 20 sec; modified to using ball x ~ 6 reps    Therapeutic Activity  Pt education re: Avoid being sedenatary for periods of time, walk around interior of home    Gait training with/without SPC; possible increased speed with SPC, pt reported felt unsteady with SPC use. Cuing for heel - toe gait pattern     Therapeutic Exercise Min 10 30    Therapeutic Activity Min  10    Total of Timed Procedures 10 40    Total of Service Based 0 0    Total Treatment Time 10 40           2/26/2025 3/12/2025   Spine Treatment   Treatment Day 2       Pt arrived ~ 15 min late to PT session. 3       Pt arrived to PT appointment ~ 15 min late.   Therapeutic Exercise  Pt c/o LE cramps evenings with LTR; advised to complete during day when asymptomatic or to consider seated trunk rotation. Practiced seated trunk rotation 5 x 3 sec each    Seated 3 way lumbar stretch 3  x 10 sec each    Seated thoracic spine ext 5 x 3 sec each    Standing alternate L/R toe taps to 6 inch step x 5 reps each, to ~12 inch step x 10 reps each     B row using blue tubing x 20 reps    Standing L/R hip flexor stretch x 20 sec each    Standing L/R hamstrings x 20 sec each     Lateral steps at bar 2 x ~ 16 feet, with RTB prox to knees 2 x ~16 feet    Monster walk forward/backward with RTB prox to knees 3 x ~ 16 feet    Stationary recumbent bike: level 1 x 3 min       Therapeutic Exercise Minutes  38   Total Time Of Timed Procedures  38   Total Time Of Service-Based  Procedures  0   Total Treatment Time  38   HEP  Bridge x 5 reps     Supine LTR 3 x 5 sec each     Seated cat cow x 5 reps     Seated LAQ/sciatic nerve glides x 5 reps          HEP  Bridge x 5 reps     Supine LTR 3 x 5 sec each     Seated cat cow x 5 reps     Seated LAQ/sciatic nerve glides x 5 reps      Charges  3 TE

## 2025-03-17 ENCOUNTER — OFFICE VISIT (OUTPATIENT)
Dept: PHYSICAL THERAPY | Age: 75
End: 2025-03-17
Payer: MEDICARE

## 2025-03-17 PROCEDURE — 97110 THERAPEUTIC EXERCISES: CPT | Performed by: PHYSICAL THERAPIST

## 2025-03-17 NOTE — PROGRESS NOTES
Patient: Melisa Agosto (74 year old, female) Referring Provider:  Insurance:   Diagnosis: Low back pain (M54.50)  Muscle spasm of back (M62.830)  Difficulty walking (R26.2) Physician Nonstaff  MEDICARE   Date of Surgery: laminectomy ~ 2017, fusion ~ 2019 Next MD visit:  [facility]   Precautions:  Fall Risk (osteopenia, CAD) TBD Referral Information:    Date of Evaluation: Req: 0, Auth: 0, Exp:     01/29/25 (Pt arrived ~ 15 min late.) POC Auth Visits:  10       Today's Date   3/17/2025    Subjective  Had f/u with PCP over the weekend: to continue wtih PT including for my gait concerns, to see neurologist, to have 2nd shingles shot this week, advised to continue taking Tramadol, f/u again in 3 months. C/o pinching L LB.  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       Pain: 6/10 (after tramadol)     Objective  Gait: downward gaze, slow, no assistive device          Assessment  Tardy arrival limited session content. DIscussed consideration of SPC use during gait if helps with gait stability; pt to bring her SPC to next PT sessions. Progressing balance, spine ROM, LE/lumbar stabilizer strengthening.    Goals (to be met in 10 visits)   Therapy Goals    Patient to report independence with HEP to facilitate self management.  Patient to have L/R LE MMT each at least 4+/5 to facilitate transfers, stair negotiation.   Patient to have lumbar spine AROM grossly WFL to facilitate transfers.   Patient to walk/stand each at least 5 min.   Patient to use appropriate assistive device for improved safety with ambulation/to reduce fall risk.               Plan  Continue PT for up to 10 sessions. See Assessment.    Treatment Last 4 Visits       1/29/2025 2/26/2025 3/12/2025 3/17/2025   PT Treatment   Treatment Day  2       Pt arrived ~ 15 min late to PT session. 3       Pt arrived to PT appointment ~ 15 min late. 4       Pt arrived to session ~ 20 min late.   Therapeutic Exercise Bridge x 5 reps    Supine LTR  3 x 5 sec each    Seated cat cow x 5 reps    Seated LAQ/sciatic nerve glides x 5 reps   HEP verbal review; see HEP section.    Recumbent stationary bike: level 1 x 3 min    Sit - stand from mid height plinth x 10 reps    Standing alternate L/R hip abd x ~ 12 reps    Standing B heel - toe raises x ~ 15 reps     Seated 3 way spine stretch x 15 sec each    Bridge x 4 reps - c/o hamstrings cramps, resolved with supine PT A hamstrings stretches x 20 sec; modified to using ball x ~ 6 reps     Therapeutic Activity  Pt education re: Avoid being sedenatary for periods of time, walk around interior of home    Gait training with/without SPC; possible increased speed with SPC, pt reported felt unsteady with SPC use. Cuing for heel - toe gait pattern      Therapeutic Exercise Min 10 30     Therapeutic Activity Min  10     Total of Timed Procedures 10 40     Total of Service Based 0 0     Total Treatment Time 10 40            2/26/2025 3/12/2025 3/17/2025   Spine Treatment   Treatment Day 2       Pt arrived ~ 15 min late to PT session. 3       Pt arrived to PT appointment ~ 15 min late. 4       Pt arrived to session ~ 20 min late.   Therapeutic Exercise  Pt c/o LE cramps evenings with LTR; advised to complete during day when asymptomatic or to consider seated trunk rotation. Practiced seated trunk rotation 5 x 3 sec each    Seated 3 way lumbar stretch 3  x 10 sec each    Seated thoracic spine ext 5 x 3 sec each    Standing alternate L/R toe taps to 6 inch step x 5 reps each, to ~12 inch step x 10 reps each     B row using blue tubing x 20 reps    Standing L/R hip flexor stretch x 20 sec each    Standing L/R hamstrings x 20 sec each     Lateral steps at bar 2 x ~ 16 feet, with RTB prox to knees 2 x ~16 feet    Monster walk forward/backward with RTB prox to knees 3 x ~ 16 feet    Stationary recumbent bike: level 1 x 3 min     Stationary recumbent bike: level 1 x 3 min    Lateral steps at bar: no band x ~ 10 feet, with RTB prox to  knees 2 x ~ 10 feet; monster walk with RTB prox to knees 3 x ~ 10 feet      B row using blue tubing x 20 reps     Standing L/R hip flexor stretch x 20 sec each, hamstrings stretch x 20 sec     Toe tap to ~ 6 inch step x 5 reps each , ~ 12 inch step x 5 reps each L/R LE      Seated 3 way lumbar stretch 3  x 10 sec each - not completed today, insufficient time     Seated thoracic spine ext 5 x 3 sec       Therapeutic Activity   Advised pt may consider SPC as helpful.     Gait training using SPC: pt declined today. Pt using SPC for longer distances. Pt to bring SPC to next session    Therapeutic Exercise Minutes  38 20   Therapeutic Activity Minutes   5   Total Time Of Timed Procedures  38 25   Total Time Of Service-Based Procedures  0 0   Total Treatment Time  38 25   HEP  Bridge x 5 reps     Supine LTR 3 x 5 sec each     Seated cat cow x 5 reps     Seated LAQ/sciatic nerve glides x 5 reps           HEP  Bridge x 5 reps     Supine LTR 3 x 5 sec each     Seated cat cow x 5 reps     Seated LAQ/sciatic nerve glides x 5 reps      Charges  2 TE

## 2025-03-20 ENCOUNTER — TELEPHONE (OUTPATIENT)
Dept: PHYSICAL THERAPY | Age: 75
End: 2025-03-20

## 2025-03-20 ENCOUNTER — APPOINTMENT (OUTPATIENT)
Dept: PHYSICAL THERAPY | Age: 75
End: 2025-03-20
Attending: PODIATRIST
Payer: MEDICARE

## 2025-03-26 ENCOUNTER — OFFICE VISIT (OUTPATIENT)
Dept: PHYSICAL THERAPY | Age: 75
End: 2025-03-26
Attending: PODIATRIST
Payer: MEDICARE

## 2025-03-26 PROCEDURE — 97110 THERAPEUTIC EXERCISES: CPT | Performed by: PHYSICAL THERAPIST

## 2025-03-26 PROCEDURE — 97530 THERAPEUTIC ACTIVITIES: CPT | Performed by: PHYSICAL THERAPIST

## 2025-03-26 NOTE — PROGRESS NOTES
Patient: Melisa Agosto (74 year old, female) Referring Provider:  Insurance:   Diagnosis: Low back pain (M54.50)  Muscle spasm of back (M62.830)  Difficulty walking (R26.2) [employer] [employer] [employer]   Date of Surgery: laminectomy ~ 2017, fusion ~ 2019 Next MD visit:  [facility]   Precautions:  Fall Risk (osteopenia, CAD) TBD Referral Information:    Date of Evaluation: Req: 0, Auth: 0, Exp:     01/29/25 (Pt arrived ~ 15 min late.) POC Auth Visits:  10       Today's Date   3/26/2025    Subjective  C/o LBP, LE pain. No longer using SPC; did not bring it to PT clinic.  Deny any loss of balance or falls. Completing HEP.       Pain: 6/10 (Pt reports took Tramadol this morning.)     Objective  Gait: slow, no assitive device        Assessment  Pt with continued c/o chronic LBP/LE c/o. Progressing interventions to address LBP, lumbar spine ROM deficits, LE weaknness. Pt reported felt better following PT session. Gait trained pt using SPC; no signif difference in gait; advised may use as helpful.  Pt with limited attendance, chronic tardy arrival.    Goals (to be met in 10 visits)   Therapy Goals    Patient to report independence with HEP to facilitate self management.  Patient to have L/R LE MMT each at least 4+/5 to facilitate transfers, stair negotiation.   Patient to have lumbar spine AROM grossly WFL to facilitate transfers.   Patient to walk/stand each at least 5 min.   Patient to use appropriate assistive device for improved safety with ambulation/to reduce fall risk.                   Plan  Continue PT 1-2 x weekly for up to 10 sessions. Progress gait, balance, lumbar ROM/stabilization, LE strengthening.    Treatment Last 4 Visits  Treatment Day: 5 (Pt arrived to session ~ 15 min late.)       3/12/2025 3/17/2025 3/26/2025   Spine Treatment   Therapeutic Exercise Pt c/o LE cramps evenings with LTR; advised to complete during day when asymptomatic or to consider seated trunk rotation. Practiced seated trunk  rotation 5 x 3 sec each    Seated 3 way lumbar stretch 3  x 10 sec each    Seated thoracic spine ext 5 x 3 sec each    Standing alternate L/R toe taps to 6 inch step x 5 reps each, to ~12 inch step x 10 reps each     B row using blue tubing x 20 reps    Standing L/R hip flexor stretch x 20 sec each    Standing L/R hamstrings x 20 sec each     Lateral steps at bar 2 x ~ 16 feet, with RTB prox to knees 2 x ~16 feet    Monster walk forward/backward with RTB prox to knees 3 x ~ 16 feet    Stationary recumbent bike: level 1 x 3 min     Stationary recumbent bike: level 1 x 3 min    Lateral steps at bar: no band x ~ 10 feet, with RTB prox to knees 2 x ~ 10 feet; monster walk with RTB prox to knees 3 x ~ 10 feet      B row using blue tubing x 20 reps     Standing L/R hip flexor stretch x 20 sec each, hamstrings stretch x 20 sec     Toe tap to ~ 6 inch step x 5 reps each , ~ 12 inch step x 5 reps each L/R LE      Seated 3 way lumbar stretch 3  x 10 sec each - not completed today, insufficient time     Seated thoracic spine ext 5 x 3 sec     Stationary recumbent bike: level 1 x 3 min     B row using blue tubing x 20 reps     Standing L/R hip flexor stretch x 20 sec each, hamstrings stretch x 20 sec      Toe tap to ~ 6 inch step x 5 reps each , ~ 12 inch step x 10 reps each L/R LE     6 inch lateral step up - pt c/o too weak to complete.    4 inch lateral step up  x 10 reps each L/R LE - pt reported too easy, with contralateral hip abd x 10 reps     Seated 3 way lumbar stretch 3  x 10 sec each     Seated thoracic spine ext 5 x 3 sec       Therapeutic Activity  Advised pt may consider SPC as helpful.     Gait training using SPC: pt declined today. Pt using SPC for longer distances. Pt to bring SPC to next session  Gait training using SPC and no assistive device , galt belt donned, PT contact guard - no signif diff with vs without SPC   Therapeutic Exercise Minutes 38 20 30   Therapeutic Activity Minutes  5 8   Total Time Of  Timed Procedures 38 25 38   Total Time Of Service-Based Procedures 0 0 0   Total Treatment Time 38 25 38   HEP Bridge x 5 reps     Supine LTR 3 x 5 sec each     Seated cat cow x 5 reps     Seated LAQ/sciatic nerve glides x 5 reps            HEP  Bridge x 5 reps     Supine LTR 3 x 5 sec each     Seated cat cow x 5 reps     Seated LAQ/sciatic nerve glides x 5 reps      Charges  2 TE, 1 TA

## 2025-04-09 ENCOUNTER — APPOINTMENT (OUTPATIENT)
Dept: PHYSICAL THERAPY | Age: 75
End: 2025-04-09
Payer: MEDICARE

## 2025-04-09 ENCOUNTER — TELEPHONE (OUTPATIENT)
Dept: PHYSICAL THERAPY | Facility: HOSPITAL | Age: 75
End: 2025-04-09

## 2025-04-16 ENCOUNTER — TELEPHONE (OUTPATIENT)
Dept: PHYSICAL THERAPY | Facility: HOSPITAL | Age: 75
End: 2025-04-16

## 2025-04-16 ENCOUNTER — APPOINTMENT (OUTPATIENT)
Dept: PHYSICAL THERAPY | Age: 75
End: 2025-04-16
Attending: PODIATRIST
Payer: MEDICARE

## 2025-04-23 ENCOUNTER — OFFICE VISIT (OUTPATIENT)
Dept: PHYSICAL THERAPY | Age: 75
End: 2025-04-23
Attending: PODIATRIST
Payer: MEDICARE

## 2025-04-23 PROCEDURE — 97110 THERAPEUTIC EXERCISES: CPT | Performed by: PHYSICAL THERAPIST

## 2025-04-23 PROCEDURE — 97164 PT RE-EVAL EST PLAN CARE: CPT | Performed by: PHYSICAL THERAPIST

## 2025-04-23 NOTE — PROGRESS NOTES
Progress Summary  Pt has attended 6 visits in Physical Therapy.       Patient: Melisa Agosto (74 year old, female) Referring Provider:  Insurance:   Diagnosis: Low back pain (M54.50)  Muscle spasm of back (M62.830)  Difficulty walking (R26.2) [employer]   Date of Surgery: laminectomy ~ 2017, fusion ~ 2019 Next MD visit:  [facility]   Precautions:  Fall Risk (osteopenia, CAD) TBD Referral Information:    Date of Evaluation: Req: 0, Auth: 0, Exp:     01/29/25 (Pt arrived ~ 15 min late.) POC Auth Visits:  10       Today's Date   4/23/2025    Subjective  Less pain/cramping in legs since starting drinking more water and changed vitamin, per nurse and PCP.  C/o LBP and LE pain/paresthesia/pain. Had f/u wtih PCP 3/15/2025: advised c/o related to spinal stenosis, pt denies referral to a specialist (in past implant receommended for back ).  Tramadol is the only thing that seems to help. Partial HEP compliance.  LBP worse when sitting.       Pain: 8/10     Objective  Pt presents using R SPC during ambulation. Standing: forward rounded posture.  Lumbar spine AROM: flexion WNL, extension 20 deg, Rotation: L/R 45 deg, Side bend L/R each WNL.  Sensation: grossly impaired R LE dermatomes, L L4 impaired to light touch/otherwise intact to light touch. MMT: hip flex R 4/5, L 4+/5, hip abd L/R each 4+/5 , hip ER L/R each 4+/5, knee ext:  R 4-/5, L 5/5, ankle DF: L/R each 5/5, ankle PF L/R each at least 2/5. SLR: R pt c/o ankle symptoms, L negative         Assessment  Pt returns to PT after last attending 3/26/2025. Pt with limited attendance, frequent appointment cancellation and tardy arrival limiting session content.  Pt very pleasant during PT sesions. Pt wtih chronic c/o LBP, LE c/o. Pt with lumbar spine AROM deficits, LE weakness, LE sensation impaired to light touch. Pt will benfit from continued PT, pending consistent attendance, compliance and progress.    Goals (to be met in 10 visits)  - ALL GOALS IN  PROGRESS    Therapy Goals    Patient to report independence with HEP to facilitate self management.  Patient to have L/R LE MMT each at least 4+/5 to facilitate transfers, stair negotiation.   Patient to have lumbar spine AROM grossly WFL to facilitate transfers.   Patient to walk/stand each at least 5 min.   Patient to use appropriate assistive device for improved safety with ambulation/to reduce fall risk.                       Plan  Continue PT 1-2 x weekly for up to 12 sessions. Progress gait, balance, lumbar ROM/stabilization, LE strengthening. See Assessment.    Treatment Last 4 Visits  Treatment Day: 6 (Pt arrived 20 min late)       3/12/2025 3/17/2025 3/26/2025 4/23/2025   Spine Treatment   Therapeutic Exercise Pt c/o LE cramps evenings with LTR; advised to complete during day when asymptomatic or to consider seated trunk rotation. Practiced seated trunk rotation 5 x 3 sec each    Seated 3 way lumbar stretch 3  x 10 sec each    Seated thoracic spine ext 5 x 3 sec each    Standing alternate L/R toe taps to 6 inch step x 5 reps each, to ~12 inch step x 10 reps each     B row using blue tubing x 20 reps    Standing L/R hip flexor stretch x 20 sec each    Standing L/R hamstrings x 20 sec each     Lateral steps at bar 2 x ~ 16 feet, with RTB prox to knees 2 x ~16 feet    Monster walk forward/backward with RTB prox to knees 3 x ~ 16 feet    Stationary recumbent bike: level 1 x 3 min     Stationary recumbent bike: level 1 x 3 min    Lateral steps at bar: no band x ~ 10 feet, with RTB prox to knees 2 x ~ 10 feet; monster walk with RTB prox to knees 3 x ~ 10 feet      B row using blue tubing x 20 reps     Standing L/R hip flexor stretch x 20 sec each, hamstrings stretch x 20 sec     Toe tap to ~ 6 inch step x 5 reps each , ~ 12 inch step x 5 reps each L/R LE      Seated 3 way lumbar stretch 3  x 10 sec each - not completed today, insufficient time     Seated thoracic spine ext 5 x 3 sec     Stationary recumbent bike:  level 1 x 3 min     B row using blue tubing x 20 reps     Standing L/R hip flexor stretch x 20 sec each, hamstrings stretch x 20 sec      Toe tap to ~ 6 inch step x 5 reps each , ~ 12 inch step x 10 reps each L/R LE     6 inch lateral step up - pt c/o too weak to complete.    4 inch lateral step up  x 10 reps each L/R LE - pt reported too easy, with contralateral hip abd x 10 reps     Seated 3 way lumbar stretch 3  x 10 sec each     Seated thoracic spine ext 5 x 3 sec     Bridge - c/o LBP, modified to standing hip ext x 10 reps standing with UE support     Supine LTR 3 x 5 sec each     Seated cat cow x 5 reps     Seated LAQ/sciatic nerve glides x 5 reps    Standing hip abd x 10 reps    Therapeutic Activity  Advised pt may consider SPC as helpful.     Gait training using SPC: pt declined today. Pt using SPC for longer distances. Pt to bring SPC to next session  Gait training using SPC and no assistive device , galt belt donned, PT contact guard - no signif diff with vs without SPC    Therapeutic Exercise Minutes 38 20 30 15   Therapeutic Activity Minutes  5 8    Re-Eval Minutes    10   Total Time Of Timed Procedures 38 25 38 15   Total Time Of Service-Based Procedures 0 0 0 10   Total Treatment Time 38 25 38 25   HEP Bridge x 5 reps     Supine LTR 3 x 5 sec each     Seated cat cow x 5 reps     Seated LAQ/sciatic nerve glides x 5 reps     standing hip ext with UE support     Supine LTR 3 x 5 sec each     Seated cat cow x 5 reps     Seated LAQ/sciatic nerve glides         HEP  standing hip ext with UE support     Supine LTR 3 x 5 sec each     Seated cat cow x 5 reps     Seated LAQ/sciatic nerve glides       Charges  1 TE, 1 Re-eval    Oswestry to be re-administered at discharge.     Plan: Continue skilled Physical Therapy 1-2 x/week or a total of 12  visits over a 90 day period. Treatment will include: there ex, there activities, manual therapy, NM RE, and modalities as needed.    SEE PLAN/ASSESSMENT ABOVE.      Patient/Family/Caregiver was advised of these findings, precautions, and treatment options and has agreed to actively participate in planning and for this course of care.    Thank you for your referral. If you have any questions, please contact me at Dept: [phone].    Sincerely,  Electronically signed by therapist: Kalina Beckham PT     Physician's certification required:  Yes  Please co-sign or sign and return this letter via fax as soon as possible to [phone].   I certify the need for these services furnished under this plan of treatment and while under my care.    X___________________________________________________ Date____________________    Certification From: 4/23/2025  To:7/22/2025

## 2025-04-30 ENCOUNTER — OFFICE VISIT (OUTPATIENT)
Dept: PHYSICAL THERAPY | Age: 75
End: 2025-04-30
Attending: PODIATRIST
Payer: MEDICARE

## 2025-04-30 PROCEDURE — 97110 THERAPEUTIC EXERCISES: CPT | Performed by: PHYSICAL THERAPIST

## 2025-04-30 NOTE — PROGRESS NOTES
Patient: Melisa Agosto (74 year old, female) Referring Provider:  Insurance:   Diagnosis: Low back pain (M54.50)  Muscle spasm of back (M62.830)  Difficulty walking (R26.2) No ref. provider found  MEDICARE   Date of Surgery: laminectomy ~ 2017, fusion ~ 2019 Next MD visit:  [employer]   Precautions:  Fall Risk TBD Referral Information:    Date of Evaluation: Req: 0, Auth: 0, Exp:     01/29/25 (Pt arrived ~ 15 min late.) POC Auth Visits:  12       Today's Date   4/30/2025    Subjective  Late due to traffic. Pain is not too bad today.       Pain: 3/10     Objective  Gait: slow gait, no assistive device, forward rounded        Assessment  Session focused on LE weakness, lumbar spine ROM deficits, balance, gait, LE endurance. Pt tolerated session well. Tardy arrival continues to limit session content.  Pt continues to be very pleasant during PT sessions .Pt prefers to continue PT at current clinic, declines consideration of clinic closer to home/less traffic/easier commute    Goals (to be met in 10 visits)   - ALL GOALS IN PROGRESS    Therapy Goals    Patient to report independence with HEP to facilitate self management.  Patient to have L/R LE MMT each at least 4+/5 to facilitate transfers, stair negotiation.   Patient to have lumbar spine AROM grossly WFL to facilitate transfers.   Patient to walk/stand each at least 5 min.   Patient to use appropriate assistive device for improved safety with ambulation/to reduce fall risk.                           Plan  Continue PT 1-2 x weekly for up to 12 sessions. Progress gait, balance, lumbar ROM/stabilization, LE strengthening.    Treatment Last 4 Visits  Treatment Day: 7 (Pt arrived ~ 12 min late.)       3/17/2025 3/26/2025 4/23/2025 4/30/2025   Spine Treatment   Therapeutic Exercise Stationary recumbent bike: level 1 x 3 min    Lateral steps at bar: no band x ~ 10 feet, with RTB prox to knees 2 x ~ 10 feet; monster walk with RTB prox to knees 3 x ~ 10 feet       B row using blue tubing x 20 reps     Standing L/R hip flexor stretch x 20 sec each, hamstrings stretch x 20 sec     Toe tap to ~ 6 inch step x 5 reps each , ~ 12 inch step x 5 reps each L/R LE      Seated 3 way lumbar stretch 3  x 10 sec each - not completed today, insufficient time     Seated thoracic spine ext 5 x 3 sec     Stationary recumbent bike: level 1 x 3 min     B row using blue tubing x 20 reps     Standing L/R hip flexor stretch x 20 sec each, hamstrings stretch x 20 sec      Toe tap to ~ 6 inch step x 5 reps each , ~ 12 inch step x 10 reps each L/R LE     6 inch lateral step up - pt c/o too weak to complete.    4 inch lateral step up  x 10 reps each L/R LE - pt reported too easy, with contralateral hip abd x 10 reps     Seated 3 way lumbar stretch 3  x 10 sec each     Seated thoracic spine ext 5 x 3 sec     Bridge - c/o LBP, modified to standing hip ext x 10 reps standing with UE support     Supine LTR 3 x 5 sec each     Seated cat cow x 5 reps     Seated LAQ/sciatic nerve glides x 5 reps    Standing hip abd x 10 reps  Seated spine ext 5 x 5 sec each    Seated 3 way lumbar stretch/ROM 3 x 10 sec     Stationary recumbent bike: level 1 x 5 min     B row using blue tubing x 20 reps     Standing L/R hip flexor stretch x 40 sec each, hamstrings stretch x 40 sec      Toe tap to ~ 6 inch step x 3 reps too easy , ~ 12 inch step x 15 reps each L/R LE - too easy, progress next session     4 inch lateral step up each L/R LE with contralateral hip abd x 10 reps    Supine LTR 5 x 5 sec each     Bridge on ball x 20 reps    Supine L/R sciatic nerve glides x 10 reps each     Standing B heel/toe raises x 20 reps           Manual Therapy    PROM L/R hip flex, abd, ER   Therapeutic Activity Advised pt may consider SPC as helpful.     Gait training using SPC: pt declined today. Pt using SPC for longer distances. Pt to bring SPC to next session  Gait training using SPC and no assistive device , galt belt donned, PT  contact guard - no signif diff with vs without SPC     Therapeutic Exercise Minutes 20 30 15 35   Manual Therapy Minutes    5   Therapeutic Activity Minutes 5 8     Re-Eval Minutes   10    Total Time Of Timed Procedures 25 38 15 40   Total Time Of Service-Based Procedures 0 0 10 0   Total Treatment Time 25 38 25 40   HEP   standing hip ext with UE support     Supine LTR 3 x 5 sec each     Seated cat cow x 5 reps     Seated LAQ/sciatic nerve glides          HEP  standing hip ext with UE support     Supine LTR 3 x 5 sec each     Seated cat cow x 5 reps     Seated LAQ/sciatic nerve glides     Charges  3 TE

## 2025-05-08 ENCOUNTER — TELEPHONE (OUTPATIENT)
Dept: PHYSICAL THERAPY | Age: 75
End: 2025-05-08

## 2025-05-08 NOTE — TELEPHONE ENCOUNTER
Left patient voicemail re: no show to today's PT appointment. Confirmed next PT appointment. Requested that pt call [phone] if reschedule necessary.

## 2025-05-14 ENCOUNTER — OFFICE VISIT (OUTPATIENT)
Dept: PHYSICAL THERAPY | Age: 75
End: 2025-05-14
Attending: PODIATRIST
Payer: MEDICARE

## 2025-05-14 PROCEDURE — 97110 THERAPEUTIC EXERCISES: CPT | Performed by: PHYSICAL THERAPIST

## 2025-05-14 NOTE — PROGRESS NOTES
Patient: Melisa Agosto (75 year old, female) Referring Provider:  Insurance:   Diagnosis: Low back pain (M54.50)  Muscle spasm of back (M62.830)  Difficulty walking (R26.2) Ahmad, Isama MEDICARE   Date of Surgery: lumbar surgery: 2017, 2019 Next MD visit:  [email or web address]   Precautions:  Fall Risk tbd Referral Information:    Date of Evaluation: Req: 0, Auth: 0, Exp:     No data recorded POC Auth Visits:  12       Today's Date   5/14/2025    Subjective  I am the same.  Going to start a swim class weekly with friends. C/o pain/tingling/ache R foot/ankle; feels related to back, have recovered from ankle sprain. Traffic/construction bad on way to PT clinic.       Pain: 4/10 (4-5/10)     Objective  Gait: slow gait, no assistive device, forward rounded. Standing: forward rounded posture.        Assessment  Continue to address lumbar spine, LE weakness/c/o. Pt tolerates sessions well, reported R ankle/foot c/o resolved. HEP encouraged.  Pt pleasant to work with.  Pt no showed to last PT session, arrived ~ 20 min today, limiting session content.    Goals (to be met in  )   - ALL GOALS IN PROGRESS    Therapy Goals    Patient to report independence with HEP to facilitate self management.  Patient to have L/R LE MMT each at least 4+/5 to facilitate transfers, stair negotiation.   Patient to have lumbar spine AROM grossly WFL to facilitate transfers.   Patient to walk/stand each at least 5 min.   Patient to use appropriate assistive device for improved safety with ambulation/to reduce fall risk.                               Plan  Continue PT 1-2 x weekly for up to 12 sessions, pending attendance/compliance. Progress gait, balance, lumbar ROM/stabilization, LE strengthening.    Treatment Last 4 Visits  Treatment Day: 8 (Pt arrived to PT session ~ 20 min late.)       3/26/2025 4/23/2025 4/30/2025 5/14/2025   Spine Treatment   Therapeutic Exercise Stationary recumbent bike: level 1 x 3 min     B row using blue tubing  x 20 reps     Standing L/R hip flexor stretch x 20 sec each, hamstrings stretch x 20 sec      Toe tap to ~ 6 inch step x 5 reps each , ~ 12 inch step x 10 reps each L/R LE     6 inch lateral step up - pt c/o too weak to complete.    4 inch lateral step up  x 10 reps each L/R LE - pt reported too easy, with contralateral hip abd x 10 reps     Seated 3 way lumbar stretch 3  x 10 sec each     Seated thoracic spine ext 5 x 3 sec     Bridge - c/o LBP, modified to standing hip ext x 10 reps standing with UE support     Supine LTR 3 x 5 sec each     Seated cat cow x 5 reps     Seated LAQ/sciatic nerve glides x 5 reps    Standing hip abd x 10 reps  Seated spine ext 5 x 5 sec each    Seated 3 way lumbar stretch/ROM 3 x 10 sec     Stationary recumbent bike: level 1 x 5 min     B row using blue tubing x 20 reps     Standing L/R hip flexor stretch x 40 sec each, hamstrings stretch x 40 sec      Toe tap to ~ 6 inch step x 3 reps too easy , ~ 12 inch step x 15 reps each L/R LE - too easy, progress next session     4 inch lateral step up each L/R LE with contralateral hip abd x 10 reps    Supine LTR 5 x 5 sec each     Bridge on ball x 20 reps    Supine L/R sciatic nerve glides x 10 reps each     Standing B heel/toe raises x 20 reps         Stationary recumbent bike: level 1 x 4 min    Standing B heel/toe raises x 20 reps    B gastroc stretch on slant board 3 x 20 sec each    L/R toe taps to 12 inch step x 20 reps    B row using blue tubing x 20 reps     Standing L/R hip flexor stretch x 40 sec each, hamstrings stretch x 40 sec , sciatic nerve glides ankle pumps x 10 reps      4 inch lateral step up each L/R LE with contralateral hip abd x 10 reps     Seated spine ext 5 x 5 sec each     Seated 3 way lumbar stretch/ROM 3 x 10 sec     Supine LTR 5 x 5 sec each - not completed, insufficient time     Bridge on ball x 20 reps- not completed, insufficient time           Manual Therapy   PROM L/R hip flex, abd, ER PROM L/R hip flex, abd,  ER - not completed, insufficient time   Therapeutic Activity Gait training using SPC and no assistive device , galt belt donned, PT contact guard - no signif diff with vs without SPC      Therapeutic Exercise Minutes 30 15 35 40   Manual Therapy Minutes   5    Therapeutic Activity Minutes 8      Re-Eval Minutes  10     Total Time Of Timed Procedures 38 15 40 40   Total Time Of Service-Based Procedures 0 10 0 0   Total Treatment Time 38 25 40 40   HEP  standing hip ext with UE support     Supine LTR 3 x 5 sec each     Seated cat cow x 5 reps     Seated LAQ/sciatic nerve glides           HEP  standing hip ext with UE support     Supine LTR 3 x 5 sec each     Seated cat cow x 5 reps     Seated LAQ/sciatic nerve glides     Charges  3 TE

## 2025-06-11 ENCOUNTER — APPOINTMENT (OUTPATIENT)
Dept: PHYSICAL THERAPY | Age: 75
End: 2025-06-11
Attending: PODIATRIST

## 2025-06-18 ENCOUNTER — APPOINTMENT (OUTPATIENT)
Dept: PHYSICAL THERAPY | Age: 75
End: 2025-06-18
Attending: PODIATRIST

## 2025-06-25 ENCOUNTER — APPOINTMENT (OUTPATIENT)
Dept: PHYSICAL THERAPY | Age: 75
End: 2025-06-25
Attending: PODIATRIST

## 2025-07-09 ENCOUNTER — LAB ENCOUNTER (OUTPATIENT)
Dept: LAB | Facility: HOSPITAL | Age: 75
End: 2025-07-09
Attending: STUDENT IN AN ORGANIZED HEALTH CARE EDUCATION/TRAINING PROGRAM
Payer: MEDICARE

## 2025-07-09 DIAGNOSIS — E78.5 DYSLIPIDEMIA: Primary | ICD-10-CM

## 2025-07-09 LAB
CHOLEST SERPL-MCNC: 121 MG/DL (ref ?–200)
FASTING PATIENT LIPID ANSWER: YES
HDLC SERPL-MCNC: 48 MG/DL (ref 40–59)
LDLC SERPL CALC-MCNC: 61 MG/DL (ref ?–100)
NONHDLC SERPL-MCNC: 73 MG/DL (ref ?–130)
TRIGL SERPL-MCNC: 51 MG/DL (ref 30–149)
VLDLC SERPL CALC-MCNC: 8 MG/DL (ref 0–30)

## 2025-07-09 PROCEDURE — 36415 COLL VENOUS BLD VENIPUNCTURE: CPT

## 2025-07-09 PROCEDURE — 80061 LIPID PANEL: CPT

## 2025-07-09 PROCEDURE — 83695 ASSAY OF LIPOPROTEIN(A): CPT

## 2025-07-11 LAB — LIPOPROTEIN (A): 204.7 NMOL/L

## (undated) DEVICE — Device: Brand: DEFENDO AIR/WATER/SUCTION AND BIOPSY VALVE

## (undated) DEVICE — SNARE CAPTIFLEX MICRO-OVL OLY

## (undated) DEVICE — ENDOSCOPY PACK - LOWER: Brand: MEDLINE INDUSTRIES, INC.

## (undated) DEVICE — STERILE LATEX POWDER-FREE SURGICAL GLOVESWITH NITRILE COATING: Brand: PROTEXIS

## (undated) DEVICE — TRAP 4 CPTR CHMBR N EZ INLN

## (undated) DEVICE — LINE MNTR ADLT SET O2 INTMD

## (undated) NOTE — LETTER
Patient Name: Melisa Agosto  YOB: 1950          MRN number:  [id number]  Date:  6/11/2024  Referring Physician:  Annamaria Sánchez         SPINE EVALUATION:     Diagnosis:    Chronic right-sided low back pain without sciatica (M54.50,G89.29)  Muscle spasm of back (M62.830)  Difficulty walking (R26.2)  Chronic bilateral low back pain without sciatica (M54.50,G89.29) Referring Provider: Annamaria Sánchez  Date of Evaluation:    6/11/2024    Precautions:  Fall Risk Next MD visit:   none scheduled  Date of Surgery: n/a     PATIENT SUMMARY   Melisa Agosto is a 74 year old female who presents to therapy today with complaints of chronic LBP, difficulty walking. C/o started with resistance training at gym may years ago. Pt reports chronic LBP leading up to 2 spine surgeries: laminectomy ~ 1990's, progressive L sided LBP leading up to lumbar fusion 7/9/2019.  She reports R sided issues returned following fusion. Previous interventions include several round of PT (progress noted). Patient currently c/o R shifting to L LBP radiating from bra region to buttocks, B LE pain radiating down to all toes, difficulty walking, intermittent SPC use.  She consulted Dr. Sánchez re: chronic LBP/LE c/o: referred to PT, pt denies any imaging, taking Tramadol. She c/o difficulty walking, leaning forward, driving longer periods of time related to getting into/out of car, stairs, intermittent cane use, difficulty with transfers (bed, ground), concerned re: balance. Denies falls.     Patient lives c daughter who offers assistance; stairs into basement. Patient is retired.     Current functional limitations include: difficulty walking, leaning forward, driving longer periods of time related to getting into/out of car, stairs, intermittent cane use, difficulty with transfers (bed, ground).       Melisa describes prior level of function: wheel chair outside of home 2-7/2019, chronic LBP/LE pain. Pt goals include: walk more than 1 block,  walk dog    Past medical history was reviewed with Melisa. Significant findings include: history of lumbar laminectomy 1990's, R shoulder rotator cuff repair, stent, bunionectomy, heel surgery related to work injury.    S/P colonoscopic polypectomy 1/4/2019   Diverticulosis large intestine w/o perforation or abscess w/o bleeding 1/4/2019   Internal hemorrhoids without complication 1/4/2019   Breast mass 10/24/2018   Hyperthyroidism 7/3/2018   Abnormal mammogram 1/17/2018   Osteopenia 1/17/2018   L3-4 mod-severe, L2-3 mod-severe, L1-2 mod central stenosis 11/6/2017   Sciatica of left side 10/17/2017   Coronary artery disease involving native coronary artery of native heart without angina pectoris 9/18/2017   S/P drug eluting coronary stent placement 9/18/2017   Prediabetes 9/12/2017   Other chest pain 9/12/2017   Lumbar post-laminectomy syndrome: L2-S1 laminectomies 8/3/2017   L5-S1 left mod/right severe, L4-5 left mild-mod/right severe, L3-4 left severe, L2-3 left mod-severe foraminal stenosis 8/3/2017   L2-3 left mild HNP, L3-4 left mild HNP, L4-5 right mild HNP 8/3/2017   L3-4 grade 1-2 stable spondylolisthesis 8/3/2017   History of hysterectomy for benign disease 9/3/2015   Dyslipidemia 5/28/2015       Pt describes pain level current 9/10, at best 6/10, at worst 10/10.   Pt denies diplopia, dysarthria, dysphasia, dizziness, drop attacks, bowel/bladder changes, saddle anesthesia, and JUDE LE N/T.    ASSESSMENT  Melisa presents to physical therapy evaluation with primary c/o chronic LBP, LE c/o. The results of the objective tests and measures show : forward rounded posture, pain/restriction lumbar spine AROM, LE weakness with MMT, increased fall risk.  Functional deficits include but are not limited to: difficulty walking, leaning forward, driving longer periods of time related to getting into/out of car, stairs, intermittent cane use, difficulty with transfers (bed, ground). Signs and symptoms are  consistent with diagnoses above. Pt and PT discussed evaluation findings, pathology, POC and HEP.  Pt voiced understanding and performs HEP correctly without reported pain. Skilled Physical Therapy is medically necessary to address the above impairments and reach functional goals.     OBJECTIVE:   Observation/Posture: forward rounded thoracic spine, protruded cervical spine. Surgical scar lumbar region  Neuro Screen: sensation imparied to light touch gross R LE, L impaired to light touch L L5 dermatome, L LE dermatomes otherwise intact to light touch    Lumbar SPine AROM: (* denotes performed with pain)  Flexion: WFL  Extension: neutral* pt concerned about balance  Sidebending: R/L restriction* pt c/o  Rotation: R 40 deg; L 50 deg*  pt concerned about balance    Strength: (* denotes performed with pain)  LE   Hip flexion (L2): R 3/5; L 3/5  Hip abduction: R/L mobility limits assessment  Hip Extension: R/L mobility limits assessment  Hip ER: R 4/5; L 4/5  Hip IR: R -/5; L -/5  Knee Flexion: R 4/5; L 4/5   Knee extension (L3): R 4/5; L 4/5   DF (L4): R 4/5 (reduced AROM); L 4/5     Flexibility:  time limited ability to assess  LE   Hip Flexor: R-, L -  Hamstrings: R -; L -  Piriformis: R -; L -  Quads: R -; L -  Gastroc-soleus: R -; L -     Gait: pt ambulates on level ground with no assistive device, fair dynamic balance, slow keyla.  TUG: UE A sit -stand, no assistive device (pt reports left cane in car), 38.22 sec    Today’s Treatment and Response:   Pt education was provided on exam findings, treatment diagnosis, treatment plan, expectations, and prognosis. Pt was also provided recommendations for  Bodymechanics, cane use for safety  during ambulation  Patient was instructed in and issued a HEP for: supine LTR, seated thoracic spine ext, mini squat, standing L/R hip abd with UE support    Charges: PT Eval Moderate Complexity, 1 TE (15 min)      Total Timed Treatment: 15 min     Total Treatment Time: 45 min      Based on clinical rationale and outcome measures, this evaluation involved Moderate Complexity decision making due to 1-2 personal factors/comorbidities, 3 body structures involved/activity limitations, and evolving symptoms including changing pain levels.  PLAN OF CARE:    Goals: (to be met in 8-10 visits)   Patient to report independence with PT HEP to facilitate self management and to maintain progress made in PT.   Patient to have at least 70 deg active L/R rotation to facilitate transfers.   Patient to have L/R hip flexion MMT each 4/5 to facilitate tranfers.   Patient to to have TUG score less than 20 seconds using appropriate assistive device in order to reduce fall risk.   Patient to use appropriate assistive device during ambulation in order to reduce fall risk.     Frequency / Duration: Patient will be seen for 2 x/week or a total of 8-10 visits over a 90 day period. Treatment will include: Gait training, Mechanical Traction, Neuromuscular Re-education, Self-Care Home Management, Therapeutic Activities, Therapeutic Exercise, Home Exercise Program instruction, and Modalities to include: MHP, cold pack    Education or treatment limitation: None  Rehab Potential:fair    Oswestry Disability Index Score  Score: 74 % (6/11/2024 11:53 AM)      Patient/Family/Caregiver was advised of these findings, precautions, and treatment options and has agreed to actively participate in planning and for this course of care.    Thank you for your referral. Please co-sign or sign and return this letter via fax as soon as possible to [phone]. If you have any questions, please contact me at Dept: [phone]    Sincerely,  Electronically signed by therapist: Kalina Beckham, PT    Physician's certification required: Yes  I certify the need for these services furnished under this plan of treatment and while under my care.    X___________________________________________________ Date____________________    Certification  From: 6/11/2024  To:9/9/2024 21st Century Cures Act Notice to Patient: Medical documents like this are made available to patients in the interest of transparency. However, be advised this is a medical document and it is intended as [id number]-fpnd communication between your medical providers. This medical document may contain abbreviations, assessments, medical data, and results or other terms that are unfamiliar. Medical documents are intended to carry relevant information, facts as evident, and the clinical opinion of the practitioner. As such, this medical document may be written in language that appears blunt or direct. You are encouraged to contact your medical provider and/or [facility] Patient Experience if you have any questions about this medical document.

## (undated) NOTE — LETTER
[address] [place] [address] Rd, [place], [place]     AUTHORIZATION FOR SURGICAL OPERATION OR PROCEDURE    I hereby authorize                                            , my Physician(s) and whomever may be designated as the [employer] Physician. 4. I consent to the photographing of procedure(s) to be performed for the purposes of advancing medicine, science and/or education, provided my identity is not revealed.  If the procedure has been videotaped, the physician/surgeon will obtain th (Witness signature)                                                                                                  (Date)                                (Time)  STATEMENT OF PHYSICIAN My signature below affirms that prior to the time of the procedure;  I

## (undated) NOTE — LETTER
Patient Name: Melisa Agosto  YOB: 1950          MRN :  [id number]  Date:  4/23/2025  Referring Physician:  No ref. provider found    Progress Summary  Pt has attended 6 visits in Physical Therapy.       Patient: Melisa Agosto (74 year old, female) Referring Provider:  Insurance:   Diagnosis: Low back pain (M54.50)  Muscle spasm of back (M62.830)  Difficulty walking (R26.2) CHRISTIE Yin MEDICARE   Date of Surgery: laminectomy ~ 2017, fusion ~ 2019 Next MD visit:  [employer]   Precautions:  Fall Risk (osteopenia, CAD) TBD Referral Information:    Date of Evaluation: Req: 0, Auth: 0, Exp:     01/29/25 (Pt arrived ~ 15 min late.) POC Auth Visits:  10       Today's Date   4/23/2025    Subjective  Less pain/cramping in legs since starting drinking more water and changed vitamin, per nurse and PCP.  C/o LBP and LE pain/paresthesia/pain. Had f/u wtih PCP 3/15/2025: advised c/o related to spinal stenosis, pt denies referral to a specialist (in past implant receommended for back ).  Tramadol is the only thing that seems to help. Partial HEP compliance.  LBP worse when sitting.       Pain: 8/10     Objective  Pt presents using R SPC during ambulation. Standing: forward rounded posture.  Lumbar spine AROM: flexion WNL, extension 20 deg, Rotation: L/R 45 deg, Side bend L/R each WNL.  Sensation: grossly impaired R LE dermatomes, L L4 impaired to light touch/otherwise intact to light touch. MMT: hip flex R 4/5, L 4+/5, hip abd L/R each 4+/5 , hip ER L/R each 4+/5, knee ext:  R 4-/5, L 5/5, ankle DF: L/R each 5/5, ankle PF L/R each at least 2/5. SLR: R pt c/o ankle symptoms, L negative         Assessment  Pt returns to PT after last attending 3/26/2025. Pt with limited attendance, frequent appointment cancellation and tardy arrival limiting session content.  Pt very pleasant during PT sesions. Pt wtih chronic c/o LBP, LE c/o. Pt with lumbar spine AROM deficits, LE weakness, LE sensation impaired to light  touch. Pt will benfit from continued PT, pending consistent attendance, compliance and progress.    Goals (to be met in 10 visits)  - ALL GOALS IN PROGRESS    Therapy Goals    Patient to report independence with HEP to facilitate self management.  Patient to have L/R LE MMT each at least 4+/5 to facilitate transfers, stair negotiation.   Patient to have lumbar spine AROM grossly WFL to facilitate transfers.   Patient to walk/stand each at least 5 min.   Patient to use appropriate assistive device for improved safety with ambulation/to reduce fall risk.                       Plan  Continue PT 1-2 x weekly for up to 12 sessions. Progress gait, balance, lumbar ROM/stabilization, LE strengthening. See Assessment.    Treatment Last 4 Visits  Treatment Day: 6 (Pt arrived 20 min late)       3/12/2025 3/17/2025 3/26/2025 4/23/2025   Spine Treatment   Therapeutic Exercise Pt c/o LE cramps evenings with LTR; advised to complete during day when asymptomatic or to consider seated trunk rotation. Practiced seated trunk rotation 5 x 3 sec each    Seated 3 way lumbar stretch 3  x 10 sec each    Seated thoracic spine ext 5 x 3 sec each    Standing alternate L/R toe taps to 6 inch step x 5 reps each, to ~12 inch step x 10 reps each     B row using blue tubing x 20 reps    Standing L/R hip flexor stretch x 20 sec each    Standing L/R hamstrings x 20 sec each     Lateral steps at bar 2 x ~ 16 feet, with RTB prox to knees 2 x ~16 feet    Monster walk forward/backward with RTB prox to knees 3 x ~ 16 feet    Stationary recumbent bike: level 1 x 3 min     Stationary recumbent bike: level 1 x 3 min    Lateral steps at bar: no band x ~ 10 feet, with RTB prox to knees 2 x ~ 10 feet; monster walk with RTB prox to knees 3 x ~ 10 feet      B row using blue tubing x 20 reps     Standing L/R hip flexor stretch x 20 sec each, hamstrings stretch x 20 sec     Toe tap to ~ 6 inch step x 5 reps each , ~ 12 inch step x 5 reps each L/R LE      Seated 3  way lumbar stretch 3  x 10 sec each - not completed today, insufficient time     Seated thoracic spine ext 5 x 3 sec     Stationary recumbent bike: level 1 x 3 min     B row using blue tubing x 20 reps     Standing L/R hip flexor stretch x 20 sec each, hamstrings stretch x 20 sec      Toe tap to ~ 6 inch step x 5 reps each , ~ 12 inch step x 10 reps each L/R LE     6 inch lateral step up - pt c/o too weak to complete.    4 inch lateral step up  x 10 reps each L/R LE - pt reported too easy, with contralateral hip abd x 10 reps     Seated 3 way lumbar stretch 3  x 10 sec each     Seated thoracic spine ext 5 x 3 sec     Bridge - c/o LBP, modified to standing hip ext x 10 reps standing with UE support     Supine LTR 3 x 5 sec each     Seated cat cow x 5 reps     Seated LAQ/sciatic nerve glides x 5 reps    Standing hip abd x 10 reps    Therapeutic Activity  Advised pt may consider SPC as helpful.     Gait training using SPC: pt declined today. Pt using SPC for longer distances. Pt to bring SPC to next session  Gait training using SPC and no assistive device , galt belt donned, PT contact guard - no signif diff with vs without SPC    Therapeutic Exercise Minutes 38 20 30 15   Therapeutic Activity Minutes  5 8    Re-Eval Minutes    10   Total Time Of Timed Procedures 38 25 38 15   Total Time Of Service-Based Procedures 0 0 0 10   Total Treatment Time 38 25 38 25   HEP Bridge x 5 reps     Supine LTR 3 x 5 sec each     Seated cat cow x 5 reps     Seated LAQ/sciatic nerve glides x 5 reps     standing hip ext with UE support     Supine LTR 3 x 5 sec each     Seated cat cow x 5 reps     Seated LAQ/sciatic nerve glides         HEP  standing hip ext with UE support     Supine LTR 3 x 5 sec each     Seated cat cow x 5 reps     Seated LAQ/sciatic nerve glides       Charges  1 TE, 1 Re-eval    Oswestry to be re-administered at discharge.     Plan: Continue skilled Physical Therapy 1-2 x/week or a total of 12  visits over a 90 day  period. Treatment will include: there ex, there activities, manual therapy, NM RE, and modalities as needed.    SEE PLAN/ASSESSMENT ABOVE.     Patient/Family/Caregiver was advised of these findings, precautions, and treatment options and has agreed to actively participate in planning and for this course of care.    Thank you for your referral. If you have any questions, please contact me at Dept: [phone].    Sincerely,  Electronically signed by therapist: Kalina Beckham PT     Physician's certification required:  Yes  Please co-sign or sign and return this letter via fax as soon as possible to [phone].   I certify the need for these services furnished under this plan of treatment and while under my care.    X___________________________________________________ Date____________________    Certification From: 4/23/2025  To:7/22/2025 21st Century Cures Act Notice to Patient: Medical documents like this are made available to patients in the interest of transparency. However, be advised this is a medical document and it is intended as ayvu-ki-zisy communication between your medical providers. This medical document may contain abbreviations, assessments, medical data, and results or other terms that are unfamiliar. Medical documents are intended to carry relevant information, facts as evident, and the clinical opinion of the practitioner. As such, this medical document may be written in language that appears blunt or direct. You are encouraged to contact your medical provider and/or [facility] Patient Experience if you have any questions about this medical document.

## (undated) NOTE — LETTER
Patient Name: Melisa Agosto  YOB: 1950          MRN :  [id number]  Date:  11/25/2024  Referring Physician:  Physician Nonstaff    ProgressSummashira  Pt has attended 6 visits in Physical Therapy.     Diagnosis:   R ankle sprain Referring Provider:  Willem Ramos DPM  Date of Evaluation:    10/30/2024    Precautions:  fall risk, chronic LBP/LE Next MD visit:   12/5/2024  Date of Surgery: n/a   Insurance Primary/Secondary: MEDICARE / [employer]     # Auth Visits: POC every 10 visits            Subjective: Rescheduled DPM appointment due to parking issue, now f/u with him on 12/5/2024.  Did not wear my ankle brace at home over the weekend without c/o; the brace bothers me.  Had f/u with PCP, thinks swelling is related to ankle sprain; discussed compression socks, LE elevation, MRI if ankle does not improve.  Discussed computer ergonomics, sitting for LB/LE c/o related to spinal stenosis when walking. C/o hamstrings/gastroc cramps when sitting. Completing HEP.     Pain:  4/10       Objective:   Min edema R ankle, min - mod L ankle  Gross restrictions R ankle AROM > PROM  Gait: pt able to ambulate in PT clinic for short distances without ankle orthosis, no assistive devices, ankles stable    Observation: R ankle brace donned.       AROM (degrees): (* denotes performed with pain)  Foot/Ankle   DF: R -5; L -  PF: R 50; L 50  INV: R 20; L 20  EV: R 15; L 30  Restriction B feet digit flex/ext     Accessory motion: mild restriction R ankle talocrural ant/post glide    Flexibility:  Gastroc-soleus: R/L restriction    Strength/MMT: (* denotes performed with pain)  Foot/Ankle   DF: R 5/5; L 5/5  PF: R unable * c/o weakness/pain, L at least 3/5  INV: R 5/5; L 5/5  EV: R 4/5; L 5/5     Stairs: pt negotiates 6 inch stairs reciprocally.   Balance: SLS: R ~ 1 sec, c/o pain limits; L ~5  sec    Assessment:   Pt progressing: less R ankle c/o/pain, improved PT/ADL tolerance, less edema, improved ROM,  strength. Pt able to walk short distances in PT clinic and at home (per pt) without c/o, without ankle instability.  Completed limited interventions without R ankle orthosis donned; pt tolerated well without ankle instability with with some duration limitations. Advised ankle brace use, per DPM, including outdoors. Pt will benefit from limited number of sessions to address residual R ankle AROM, strength, proprioceptive, functional deficits.   Goals:    (to be met in 8-10 visits)  Patient to report independence with PT HEP to facilitate self management. - MET  Patient to have at least L/R ankle DF to at least neutral to facilitate toe clearance/normal gait mechanics. - PROGRESS  Patient to complete L/R SLS each at least 3 sec in order to reduce risk for ankle instability. - PROGRESS  Patient to walk/stand each at least 5 min unlimited by R ankle c/o. - PROGRESS  Patient to have R ankle eversion MMT 5/5 in order to reduce risk for R ankle instability. - PROGRESS   Patient to reciprocally negotiate stairs unlimited by R ankle. - NEARLY MET    Plan: Continue PT 2 x weekly for 10-12 visits.   Progress  steps, R ankle AROM, strength, stability, proprioception, WB.    Date: 11/6/2024  TX#: 2/6-10 Date:  11/11/2024               TX#: 3/6-10 - Pt arrived ~ 15 min late.  Date:     11/13/2024            TX#: 4/6-10 - Pt arrived ~ 10 min late. Date:   11/18/2024            TX#: 5/6-10 Date: 11/25/2024  Tx#: 6/10-12   There Ex:     HEP review; see below:  R ankle PF/DF and inversion/eversion AROM 5 x 5 sec each - cuing form (HEP)  R foot toe curls x 10 reps - cuing form (HEP)  Seated R ankle eversion using YTB x ~ 15 reps  Long sitting R gastroc stretch using strap 2 x 20 sec  (HEP)  Seated R ankle circles using round board x 10 reps each clockwise/counterclockwise  Seated R ankle DF mobilization 5 x 10 sec each There Ex:   R ankle inversion and eversion using YTB x 20 reps each  Seated B heel/toe raises x 20 reps  Seated R  ankle circles using round board x 10 reps each clockwise/counterclockwise  Standing L/R hip abd 2 x  10 reps    There Ex:   R ankle circles x 10 reps each clockwise/counterclockwise  R ankle inversion and eversion using YTB x 20 reps each  Standing B heel/toe raises 2 x 10 reps  Standing L/R gastroc stretch 2 x 20 sec   There Ex:   R ankle alphabet x 1   R ankle inversion and eversion using RTB x 20 reps each  Standing B heel/toe raises 2 x 10 reps  B gastroc stretch on slant board 2 x 30 sec each  4 inch step up x 5 reps R LE, up over down using R LE x 5 reps  There Ex:   R ankle alphabet x 1   R ankle inversion and eversion using RTB x 20 reps each  Seated hamstrings/gastroc stretch for c/o cramps  when sitting  Standing B heel/toe raises 2 x 10 reps (HEP)  B gastroc stretch on slant board 2 x 30 sec each  Mini staircase negotiation (4, ~ 6 inch steps) x 1   Seated R ankle AROM rockerboard taps PF/DF and inversion/eversion x 10 reps each, pt declined standing due to reporting increased ankle c/o  HEP review; see below.      Manual:   R ankle edema mobilization  Gentle cross friction massage R anterior talofibular ligament  Grade II R talocrural ant/post glides   PROM R ankle PF, DF, inversion, eversion   Manual:   Grade II R talocrural ant/post glides   PROM R ankle PF, DF, inversion, eversion  R gastroc stretches 3 x 20 sec each  Manual:   Grade II R talocrural ant/post glides   PROM R ankle PF, DF, inversion, eversion  R gastroc stretches 3 x 20 sec each   R foot MTP/IP flex/ext PROM Manual:   Grade II R talocrural ant/post glides   PROM R ankle PF, DF, inversion, eversion  R gastroc stretches 3 x 20 sec each   R foot MTP/IP flex/ext PROM Manual:   Grade II R talocrural ant/post glides   PROM R ankle PF, DF, inversion, eversion  R gastroc stretches 3 x 20 sec each   R foot MTP/IP flex/ext PROM  Rhythmic stabilization R ankle x ~30 sec   TA:  Pt education re: contact PCP re: LE edema, may consider LE elevation  with/without ankle pumps, compression socks  Tandem balance, with arm circles, sweeps    NM RE:  Tandem balance x 20 sec each (HEP) NM RE:   AirEx: Alternate L/R LE marching 10 x 3 sec each NM RE:   Gait training: cuing for heel to toe gait pattern, R LE neutral (avoid excessive ER)  Heel to toe walking forward/backward 2 x ~ 16 feet   Marching (pt reports completing at home, advised to hold a few seconds): 5 x 5 sec   AirEx: Alternate L/R LE marching 10 x 3 sec each NM RE:  Heel to toe walking forward/backward 2 x ~ 16 feet    AirEx: Alternate L/R LE marching 10 x 3 sec each    TA: Brief review of computer ergonomics, per pt inquiry          HEP: ankle AROM PF/DF, inversion/eversion, toe curls - discontinue , standing heel/toe raises,  standing gastroc stretch, tandem balance with/without arm sweeps at countertop, marching at countertop    Charges: 2 TE (25 min), 1 manual (12 min), 1 NM RE (5 min), 0 TA (3 min) Total Timed Treatment: 45 min  Total Treatment Time: 48 min      Plan: Continue skilled Physical Therapy 2 x/week or a total of 10-12 visits over a 90 day period. Treatment will include: there ex, there activities, manual therapy, NM Re-ed and modalities as needed.        Patient/Family/Caregiver was advised of these findings, precautions, and treatment options and has agreed to actively participate in planning and for this course of care.    Thank you for your referral. If you have any questions, please contact me at Dept: [phone].    Sincerely,  Electronically signed by therapist: Kalina Beckham, PT     Physician's certification required:  Yes  Please co-sign or sign and return this letter via fax as soon as possible to [phone].   I certify the need for these services furnished under this plan of treatment and while under my care.    X___________________________________________________ Date____________________    Certification From: 11/25/2024  To:2/23/2025            21st Century Cures Act  Notice to Patient: Medical documents like this are made available to patients in the interest of transparency. However, be advised this is a medical document and it is intended as xske-wd-[id number] communication between your medical providers. This medical document may contain abbreviations, assessments, medical data, and results or other terms that are unfamiliar. Medical documents are intended to carry relevant information, facts as evident, and the clinical opinion of the practitioner. As such, this medical document may be written in language that appears blunt or direct. You are encouraged to contact your medical provider and/or [facility] Patient Experience if you have any questions about this medical document.

## (undated) NOTE — LETTER
Patient Name: Melisa Agosto  YOB: 1950          MRN number:  [id number]  Date:  10/31/2024  Referring Physician:  Willem Ramos         LOWER EXTREMITY EVALUATION:     Diagnosis:   R ankle sprain    Referring Provider: Willem Ramos DPM   Date of Evaluation:    10/30/2024    Precautions:  fall risk, chronic LBP/LE  Next MD visit:   11/23/2024  Date of Surgery: n/a     PATIENT SUMMARY   Melisa Agosto is a 74 year old female who presents to therapy today with complaints of lateral R ankle pain limiting standing to ~ 2 min, taking time with stairs, walking limited, c/o anytime with WB, pain awakes her from sleep. Pt reports less c/o with certain footwear .  Pt reports sprained R ankle about ~ 3 weeks ago when sprained R ankle in backyard when children tried to enter her yard, dog became agitated. Pt reports twisted ankle during incident, took pain medication but c/o persist, reports some bruising following.  She consulted DPM since was unable to get into see PCP: diagnosed with ankle sprain, ankle brace 6-8 weeks, MRI  if c/o persist, referred to PT.  She has previously seen DPM for feet issues. Pt with hx of chronic back pain.     Pt describes pain level current 6/10, at best 6/10, at worst 10/10.   Current functional limitations include: lateral R ankle pain limiting standing to ~ 2 min, taking time with stairs, walking limited, c/o anytime with WB, pain awakes her from sleep.     Melisa describes prior level of function: pt denies any hx of R ankle c/o. Pt goals include:  being able to bend forward to reach foot (pt reports related to foot stiffness and LB issues), to be able to walk dog.  Past medical history was reviewed with Melisa. Significant findings include:     history of lumbar laminectomy 1990's, R shoulder rotator cuff repair, stent, bunionectomy, heel surgery related to work injury.    S/P colonoscopic polypectomy 1/4/2019   Diverticulosis large intestine w/o  perforation or abscess w/o bleeding 1/4/2019   Internal hemorrhoids without complication 1/4/2019   Breast mass 10/24/2018   Hyperthyroidism 7/3/2018   Abnormal mammogram 1/17/2018   Osteopenia 1/17/2018   L3-4 mod-severe, L2-3 mod-severe, L1-2 mod central stenosis 11/6/2017   Sciatica of left side 10/17/2017   Coronary artery disease involving native coronary artery of native heart without angina pectoris 9/18/2017   S/P drug eluting coronary stent placement 9/18/2017   Prediabetes 9/12/2017   Other chest pain 9/12/2017   Lumbar post-laminectomy syndrome: L2-S1 laminectomies 8/3/2017   L5-S1 left mod/right severe, L4-5 left mild-mod/right severe, L3-4 left severe, L2-3 left mod-severe foraminal stenosis 8/3/2017   L2-3 left mild HNP, L3-4 left mild HNP, L4-5 right mild HNP 8/3/2017   L3-4 grade 1-2 stable spondylolisthesis 8/3/2017   History of hysterectomy for benign disease 9/3/2015   Dyslipidemia 5/28/2015       ASSESSMENT  Melisa presents to physical therapy evaluation with primary c/o R ankle pain. The results of the objective tests and measures show: + c/o lateral R ankle, ankle/toe ROM deficits, gastroc tightness, R ankle eversion weakness, function as detailed below.  Functional deficits include but are not limited to:  R ankle pain limiting standing to ~ 2 min, taking time with stairs, walking limited, c/o anytime with WB, pain awakes her from sleep. Pt reports less c/o with certain footwear .  Signs and symptoms are consistent with diagnosis of R ankle sprain. Pt and PT discussed evaluation findings, pathology, POC and HEP.  Pt voiced understanding and performs HEP correctly without reported pain. Skilled Physical Therapy is medically necessary to address the above impairments and reach functional goals.     OBJECTIVE:   Observation: R ankle brace donned. Min - no edema R ankle.   Palpation: + c/o  R lateral ankle at anterior talofibular ligament    AROM (degrees): (* denotes performed with  pain)  Foot/Ankle   DF: R -10; L -10  PF: R 50; L 50  INV: R 18; L 20  EV: R 12; L 30  Restriction B feet digit flex/ext     Accessory motion: deferred due to ankle brace    Flexibility:  Gastroc-soleus: R/L restriction    Strength/MMT: (* denotes performed with pain)  Foot/Ankle   DF: R 5/5; L 5/5  PF: R/L each at least 2/5, not tested in standing due to c/o  INV: R 5/5; L 5/5  EV: R 4-/5*; L 5/5       Gait: pt ambulates on level ground with R LE ER, knee remains extended  Stairs: pt negotiates stairs reciprocally.   Balance: SLS: R unable without UE A, L 1 sec    Today’s Treatment and Response:   Pt education was provided on exam findings, treatment diagnosis, treatment plan, expectations, and prognosis. Pt was also provided recommendations for: gait: heel - toe gait pattern; stairs: up with L LE/down with R LE as needed, reciprocally as well tolerated.   Patient was instructed in and issued a HEP for: ankle AROM PF/DF, inversion/eversion, toe curls, long sitting gastroc stretch    Charges: PT Eval Moderate Complexity, 1 TE (12 min), 1 TA (12 min)      Total Timed Treatment: 24 min     Total Treatment Time: 45 min     Based on clinical rationale and outcome measures, this evaluation involved Moderate Complexity decision making due to 1-2 personal factors/comorbidities, 3 body structures involved/activity limitations, and evolving symptoms including changing pain levels, co- morbidities, chronic LBP  PLAN OF CARE:    Goals: (to be met in 8-10 visits)  Patient to report independence with PT HEP to facilitate self management.   Patient to have at least L/R ankle DF to at least neutral to facilitate toe clearance/normal gait mechanics.   Patient to complete L/R SLS each at least 3 sec in order to reduce risk for ankle instability.   Patient to walk/stand each at least 5 min unlimited by R ankle c/o.   Patient to have R ankle eversion MMT 5/5 in order to reduce risk for R ankle instability.   Patient to reciprocally  negotiate stairs unlimited by R ankle.     Frequency / Duration: Patient will be seen for 2 x/week or a total of 6-10 visits over a 90 day period. Treatment will include: Gait training, Manual Therapy, Neuromuscular Re-education, Self-Care Home Management, Therapeutic Activities, Therapeutic Exercise, Home Exercise Program instruction, and Modalities to include: Electrical stimulation (unattended) and Ultrasound, MHP, cold pack    Education or treatment limitation: None  Rehab Potential: good, pt with hx of chronic pain related to lumbar spine    LEFS Score  No data recorded - No PSR to administer, insufficient time during PT Evaluation    Patient/Family/Caregiver was advised of these findings, precautions, and treatment options and has agreed to actively participate in planning and for this course of care.    Thank you for your referral. Please co-sign or sign and return this letter via fax as soon as possible to [phone]. If you have any questions, please contact me at Dept: [phone]    Sincerely,  Electronically signed by therapist: Kalina Beckham, PT  Physician's certification required: Yes  I certify the need for these services furnished under this plan of treatment and while under my care.    X___________________________________________________ Date____________________    Certification From: 10/30/2024  To:1/28/2025      21st Century Cures Act Notice to Patient: Medical documents like this are made available to patients in the interest of transparency. However, be advised this is a medical document and it is intended as psqt-qd-qekc communication between your medical providers. This medical document may contain abbreviations, assessments, medical data, and results or other terms that are unfamiliar. Medical documents are intended to carry relevant information, facts as evident, and the clinical opinion of the practitioner. As such, this medical document may be written in language that appears blunt  or direct. You are encouraged to contact your medical provider and/or [facility] Patient Experience if you have any questions about this medical document.

## (undated) NOTE — LETTER
17          Linus Kam  :  1950      To Whom It May Concern: This patient was seen in our office on 17 . Work status: May return to work full-time with restrictions . .    May return to work status per abo

## (undated) NOTE — LETTER
Dear Dr. Radha Zepeda    This letter is to inform you that Pura Rainey has been attending Physical Therapy with me. See below for my most recent plan of care. ProgressSummary  Pt has attended 6 visits in Physical Therapy.    Dx:      Julianne Blackwell Function: Patient able to intermittently complete sit - stand indep s UE A. She was able to complete s/l <-> independently today ( previously required PT min- mod A).   Balance: SLS - unable L/R    Assessment: Patient progressing c PT demonstrating improved Manual:   STM B lumbar musculature, B gluteals  L/R piriformis/gluteal stretch 2 x 30 sec each Manual:   STM B lumbar musculature  L/R hip abduction, flexion, ER ROM Manual:   STM R lumbar musculature/gluteals  L/R hip abduction, flexion, ER ROM Manual: 45 min  Total Treatment Time: 45 min      Plan: Continue skilled Physical Therapy 2 x/week or a total of 16 visits over a 90 day period.  Treatment will include: there ex, there activity, neuromuscular re-education, manual therapy, modalities as needed/appr

## (undated) NOTE — LETTER
To Whom It May Concern: Edwin Guzman has been under our care regarding ongoing medical issues. Because of this, she has been required to restrict her physical activities.     She may resume her usual activities, including work, on Sept 18th, 2017

## (undated) NOTE — MR AVS SNAPSHOT
[facility]  [address] [facility], [address] [facility]   [phone]               Thank you for choosing us for your health care visit with Samir Mendoza DPM.  We are glad to serve you and happy to provide you with this summary of yo [email or web address]. If you've recently had a stay at the Hospital you can access your discharge instructions in [employer] by going to Visits < Admission Summaries.  If you've been to the Emergency Department or your doctor's office, you can view yo Visit [facility] online at  [email or web address]

## (undated) NOTE — LETTER
8/3/2017      Broderick Gregorio MD  Physical Medicine and Rehabilitation  [address] [facility], [address] [place] [address]  Dept: [phone]  Dept Fax: [phone]        RE: Consultation for [facility]        Dear Juan Francisco Jade MD,    Thank yo

## (undated) NOTE — LETTER
Patient Name: Thomas Palma  YOB: 1950          MRN number:  [id number]  Date:  10/14/2019  Referring Physician: Mert Aguila    SPINE EVALUATION:    Referring Physician: Dr. Dinorah Kelly  Diagnosis: Spondylolisthesis L3-4; LBP and L LE pain, s/ involvement, s/p lumbar spine surgery. Pt and PT discussed evaluation findings, pathology, POC and HEP. Pt voiced understanding and performs HEP correctly without reported pain.  Skilled Physical Therapy is medically necessary to address the above impairme Based on clinical rationale and outcome measures, this evaluation involved Moderate Complexity decision making due to 1-2 personal factors/comorbidities, 3 body structures involved/activity limitations, and unstable symptoms including changing pain levels.

## (undated) NOTE — LETTER
[facility], Prashant Derrick  Authorization for Invasive Procedures  1.  I hereby authorize Dr. Kaecy Pollack , my physician and whomever may be designated as the doctor's assistant, to perform the following operation and/or procedure:  Yusra Ugalde performed for the purposes of advancing medicine, science, and/or education, provided my identity is not revealed. If the procedure has been videotaped, the physician/surgeon will obtain the original videotape.  The hospital will not be responsible for stor My signature below affirms that prior to the time of the procedure, I have explained to the patient and/or her legal representative, the risks and benefits involved in the proposed treatment and any reasonable alternative to the proposed treatment.  I have

## (undated) NOTE — LETTER
10/03/18        Isela Chacon  [address] [facility]      Dear Lalo Gonsalves,    [address] [facility] records indicate that you have outstanding lab work and or testing that was ordered for you and has not yet been completed:  Orders Placed This Encounter

## (undated) NOTE — MR AVS SNAPSHOT
[address] [facility],[address] Floor  [address] [facility], [address] [place] [address] [phone]               Thank you for choosing us for your health care visit with Clarisa Dallas MD.  We are glad to serve you and happy to provide you with this summary of y [address]    Hours:  24-hours Phone:  [phone]    - gabapentin 300 MG Caps            Today's Orders     Neurosurgery Referral - In Network    Complete by:  As directed    Assoc Dx:   Foraminal stenosis o If you've recently had a stay at the Hospital you can access your discharge instructions in [employer] by going to Visits < Admission Summaries.  If you've been to the Emergency Department or your doctor's office, you can view your past visit information in My

## (undated) NOTE — LETTER
2/5/2018      Collette Footman A. Vevelyn Lemming, MD  Physical Medicine and Rehabilitation  [address] [facility], [address] [place] [address]  Dept: [phone]  Dept Fax: [phone]        RE: Consultation for [facility]        Dear Ricardo Michelle MD,    Thank yo

## (undated) NOTE — LETTER
[facility] SURGERY SCHEDULING FORM   [address] [place] [address] [place] [address]   [phone] (scheduling phone) [phone] (scheduling fax)     PATIENT INFORMATION   Patient Name:    Barry Valle   :    1950 Completed by:    Ubaldo Kevin      Date:    8/3/2017    [facility] will follow its Pre-Admission Assessment and Screening Policy for pre-admission testing.   Physicians that require   additional testing must order this directly and have results faxed to [facility] at

## (undated) NOTE — LETTER
Patient Name: Melisa Agosto  YOB: 1950          MRN number:  [id number]  Date:  10/31/2024  Referring Physician:  Physician Nonstaff         LOWER EXTREMITY EVALUATION:     Diagnosis:   R ankle sprain    Referring Provider: Willem Ramos DPM Date of Evaluation:    10/30/2024    Precautions:  fall risk, chronic LBP/LE  Next MD visit:   11/23/2024  Date of Surgery: n/a     PATIENT SUMMARY   Melisa Agosto is a 74 year old female who presents to therapy today with complaints of lateral R ankle pain limiting standing to ~ 2 min, taking time with stairs, walking limited, c/o anytime with WB, pain awakes her from sleep. Pt reports less c/o with certain footwear .  Pt reports sprained R ankle about ~ 3 weeks ago when sprained R ankle in backyard when children tried to enter her yard, dog became agitated. Pt reports twisted ankle during incident, took pain medication but c/o persist, reports some bruising following.  She consulted DPM since was unable to get into see PCP: diagnosed with ankle sprain, ankle brace 6-8 weeks, MRI  if c/o persist, referred to PT.  She has previously seen DPM for feet issues. Pt with hx of chronic back pain.     Pt describes pain level current 6/10, at best 6/10, at worst 10/10.   Current functional limitations include: lateral R ankle pain limiting standing to ~ 2 min, taking time with stairs, walking limited, c/o anytime with WB, pain awakes her from sleep.     Melisa describes prior level of function: pt denies any hx of R ankle c/o. Pt goals include:  being able to bend forward to reach foot (pt reports related to foot stiffness and LB issues), to be able to walk dog.  Past medical history was reviewed with Melisa. Significant findings include:     history of lumbar laminectomy 1990's, R shoulder rotator cuff repair, stent, bunionectomy, heel surgery related to work injury.    S/P colonoscopic polypectomy 1/4/2019   Diverticulosis large intestine w/o  perforation or abscess w/o bleeding 1/4/2019   Internal hemorrhoids without complication 1/4/2019   Breast mass 10/24/2018   Hyperthyroidism 7/3/2018   Abnormal mammogram 1/17/2018   Osteopenia 1/17/2018   L3-4 mod-severe, L2-3 mod-severe, L1-2 mod central stenosis 11/6/2017   Sciatica of left side 10/17/2017   Coronary artery disease involving native coronary artery of native heart without angina pectoris 9/18/2017   S/P drug eluting coronary stent placement 9/18/2017   Prediabetes 9/12/2017   Other chest pain 9/12/2017   Lumbar post-laminectomy syndrome: L2-S1 laminectomies 8/3/2017   L5-S1 left mod/right severe, L4-5 left mild-mod/right severe, L3-4 left severe, L2-3 left mod-severe foraminal stenosis 8/3/2017   L2-3 left mild HNP, L3-4 left mild HNP, L4-5 right mild HNP 8/3/2017   L3-4 grade 1-2 stable spondylolisthesis 8/3/2017   History of hysterectomy for benign disease 9/3/2015   Dyslipidemia 5/28/2015       ASSESSMENT  Melisa presents to physical therapy evaluation with primary c/o R ankle pain. The results of the objective tests and measures show: + c/o lateral R ankle, ankle/toe ROM deficits, gastroc tightness, R ankle eversion weakness, function as detailed below.  Functional deficits include but are not limited to:  R ankle pain limiting standing to ~ 2 min, taking time with stairs, walking limited, c/o anytime with WB, pain awakes her from sleep. Pt reports less c/o with certain footwear .  Signs and symptoms are consistent with diagnosis of R ankle sprain. Pt and PT discussed evaluation findings, pathology, POC and HEP.  Pt voiced understanding and performs HEP correctly without reported pain. Skilled Physical Therapy is medically necessary to address the above impairments and reach functional goals.     OBJECTIVE:   Observation: R ankle brace donned. Min - no edema R ankle.   Palpation: + c/o  R lateral ankle at anterior talofibular ligament    AROM (degrees): (* denotes performed with  pain)  Foot/Ankle   DF: R -10; L -10  PF: R 50; L 50  INV: R 18; L 20  EV: R 12; L 30  Restriction B feet digit flex/ext     Accessory motion: deferred due to ankle brace    Flexibility:  Gastroc-soleus: R/L restriction    Strength/MMT: (* denotes performed with pain)  Foot/Ankle   DF: R 5/5; L 5/5  PF: R/L each at least 2/5, not tested in standing due to c/o  INV: R 5/5; L 5/5  EV: R 4-/5*; L 5/5       Gait: pt ambulates on level ground with R LE ER, knee remains extended  Stairs: pt negotiates stairs reciprocally.   Balance: SLS: R unable without UE A, L 1 sec    Today’s Treatment and Response:   Pt education was provided on exam findings, treatment diagnosis, treatment plan, expectations, and prognosis. Pt was also provided recommendations for: gait: heel - toe gait pattern; stairs: up with L LE/down with R LE as needed, reciprocally as well tolerated.   Patient was instructed in and issued a HEP for: ankle AROM PF/DF, inversion/eversion, toe curls, long sitting gastroc stretch    Charges: PT Eval Moderate Complexity, 1 TE (12 min), 1 TA (12 min)      Total Timed Treatment: 24 min     Total Treatment Time: 45 min     Based on clinical rationale and outcome measures, this evaluation involved Moderate Complexity decision making due to 1-2 personal factors/comorbidities, 3 body structures involved/activity limitations, and evolving symptoms including changing pain levels, co- morbidities, chronic LBP  PLAN OF CARE:    Goals: (to be met in 8-10 visits)  Patient to report independence with PT HEP to facilitate self management.   Patient to have at least L/R ankle DF to at least neutral to facilitate toe clearance/normal gait mechanics.   Patient to complete L/R SLS each at least 3 sec in order to reduce risk for ankle instability.   Patient to walk/stand each at least 5 min unlimited by R ankle c/o.   Patient to have R ankle eversion MMT 5/5 in order to reduce risk for R ankle instability.   Patient to reciprocally  negotiate stairs unlimited by R ankle.     Frequency / Duration: Patient will be seen for 2 x/week or a total of 6-10 visits over a 90 day period. Treatment will include: Gait training, Manual Therapy, Neuromuscular Re-education, Self-Care Home Management, Therapeutic Activities, Therapeutic Exercise, Home Exercise Program instruction, and Modalities to include: Electrical stimulation (unattended) and Ultrasound, MHP, cold pack    Education or treatment limitation: None  Rehab Potential: good, pt with hx of chronic pain related to lumbar spine    LEFS Score  No data recorded - No PSR to administer, insufficient time during PT Evaluation    Patient/Family/Caregiver was advised of these findings, precautions, and treatment options and has agreed to actively participate in planning and for this course of care.    Thank you for your referral. Please co-sign or sign and return this letter via fax as soon as possible to [phone]. If you have any questions, please contact me at Dept: [phone]    Sincerely,  Electronically signed by therapist: Kalina Beckham, PT  Physician's certification required: Yes  I certify the need for these services furnished under this plan of treatment and while under my care.    X___________________________________________________ Date____________________    Certification From: 10/30/2024  To:1/28/2025      21st Century Cures Act Notice to Patient: Medical documents like this are made available to patients in the interest of transparency. However, be advised this is a medical document and it is intended as [id number] communication between your medical providers. This medical document may contain abbreviations, assessments, medical data, and results or other terms that are unfamiliar. Medical documents are intended to carry relevant information, facts as evident, and the clinical opinion of the practitioner. As such, this medical document may be written in language that appears blunt  or direct. You are encouraged to contact your medical provider and/or [facility] Patient Experience if you have any questions about this medical document.

## (undated) NOTE — LETTER
[address] [place] [address] Rd, [place], [place]     AUTHORIZATION FOR SURGICAL OPERATION OR PROCEDURE    I hereby authorize                                            , my Physician(s) and whomever may be designated as the [employer] own blood, or a directed donor transfusion, I will discuss this with my Physician. 4. I consent to the photographing of procedure(s) to be performed for the purposes of advancing medicine, science and/or education, provided my identity is not revealed.  If _______________________________________________________________ ____________________________  (Witness signature)                                                                                                  (Date)                                (Time)

## (undated) NOTE — LETTER
5/14/2018      Minh Peoples MD  Physical Medicine and Rehabilitation  [address] [address]  Dept: [phone]  Dept Fax: [phone]        RE: Consultation for [facility]        Dear Renzo Hanna MD,    Thank y

## (undated) NOTE — LETTER
Patient Name: Melisa Agosto  YOB: 1950          MRN number:  [id number]  Date:  1/29/2025  Referring Physician:  Annamaria Sánchez         SPINE EVALUATION:     Diagnosis:   Low back pain (M54.50)  Muscle spasm of back (M62.830)  Difficulty walking (R26.2) Patient:  Melisa Agosto (74 year old, female)        Referring Provider: Annamaria Sánchez  Today's Date   1/29/2025    Precautions:  Fall Risk (osteopenia, CAD)   Date of Evaluation: 01/29/25 (Pt arrived ~ 15 min late.)  Next MD visit: TBD  Date of Surgery: laminectomy ~ 2017, fusion ~ 2019     PATIENT SUMMARY   Summary of chief complaints: chronic LBP, hx of 2 lumbar surgeries (laminectomy, fusion ~ 2019). Pt reports chronic back and LE pain, weakness.  She currently c/o R ankle and L knee pain. Pt reports almost cancelled visit today due to R LBP.  Pt referred to PT 10/2024 but delayed due to participating in PT for R ankle sprain. Pt f/u with PCP 12/2024, advised c/o related to spinal stenosis, pain medication changed, advised to continue with PT. Pt reports some reduction in c/o with pain pills (Tramadol). She c/o L knee pain driving (keeps knee bent), R ankle pain with moving it, LBP > ankle pain with sleeping, c/o LBP with sitting.  Pt reports walking is challenging, unable to stand > 10-15 min. Pt reports feels frustrated, feels like getting worse. She denies f/u with spine specialist.  History of current condition: chronic, lubmar surgery x 2   Pain level: current 0 /10 (present), at best  , at worst    Description of symptoms:     Occupation: Retired   Leisure activities/Hobbies:     Prior level of function:    Current limitations:    Pt goals:    Past medical history was reviewed with Melisa.  Significant findings include:    Imaging/Tests:     Melisa  has a past medical history of Back problem, Coronary atherosclerosis, Diverticulosis large intestine w/o perforation or abscess w/o bleeding (1/4/2019), High cholesterol, History of heart  artery stent (09/2017), repair of rotator cuff (2010), Internal hemorrhoids without complication (1/4/2019), and S/P colonoscopic polypectomy (1/4/2019).  She  has a past surgical history that includes foot surgery; back surgery (2006); anesth,surgery of shoulder; other surgical history (2003); other surgical history (1987); bso, omentectomy w/freedom (1985); hysterectomy (1989); and colonoscopy (N/A, 1/4/2019).    ASSESSMENT  Melisa presents to physical therapy evaluation with primary c/o chronic LBP, hx of 2 lumbar surgeries (laminectomy, fusion ~ 2019). Pt reports chronic back and LE pain, weakness.  She currently c/o R ankle and L knee pain. Pt reports almost cancelled visit today due to R LBP.  Pt referred to PT 10/2024 but delayed due to participating in PT for R ankle sprain. Pt f/u with PCP 12/2024, advised c/o related to spinal stenosis, pain medication changed, advised to continue with PT. Pt reports some reduction in c/o with pain pills (Tramadol). She c/o L knee pain driving (keeps knee bent), R ankle pain with moving it, LBP > ankle pain with sleeping, c/o LBP with sitting.  Pt reports walking is challenging, unable to stand > 10-15 min. Pt reports feels frustrated, feels like getting worse. She denies f/u with spine specialist.. The results of the objective tests and measures show  . Functional deficits include but are not limited to  . Signs and symptoms are consistent with diagnosis of Low back pain (M54.50)  Muscle spasm of back (M62.830)  Difficulty walking (R26.2). Pt and PT discussed evaluation findings, pathology, POC and HEP.  Pt voiced understanding and performs HEP correctly without reported pain. Skilled Physical Therapy is medically necessary to address the above impairments and reach functional goals.    OBJECTIVE:   Musculoskeletal:  Observation/Posture: Standing: L iliac crest elevated vs R         (* denotes performed with pain)  Trunk ROM     Flex WFL*     Ext neutral*    R L     Side  bend hand to mid thigh* hand to mid thigh*     Rotation 50 deg* 50 deg*   ,   Hip   ROM MMT (-/5)    R L R L     Flex (L2) WFL WFL 4- 4-     Ext              Abd WFL WFL         ER WFL WFL 4 4     IR             ,   Knee   ROM MMT (-/5)    R L R L     Flex     4 4     Ext (L3)     4 4     ,   Ankle/Foot   ROM MMT (-/5)    R L R L     PF             DF (L4) 4+ 4+         Inversion             Eversion             Grt Toe Ext (L5)               Neurological:    Peripheral Neurodynamic: WNL except Negative L/R SLUMP; L/R LE dermatomes intact to light touch except L/R L4 and L5 dermatomes     Balance and Functional Mobility:  Gait: pt ambulates on level ground with other (use comment) (forward rounded posture, reduce ankle/hip excursion, wide base of support, slow cacence).          Today's Treatment and Response:   Pt education was provided on exam findings, treatment diagnosis, treatment plan, expectations, and prognosis.  Today's Treatment       1/29/2025   Treatment   Therapeutic Exercise Bridge x 5 reps    Supine LTR 3 x 5 sec each    Seated cat cow x 5 reps    Seated LAQ/sciatic nerve glides x 5 reps     Therapeutic Exercise Min 10   Total of Timed Procedures 10   Total of Service Based 0   Total Treatment Time 10         Patient was instructed in and issued a HEP for: Bridge, LTR, seated cat/cow, seated LAQ/sciatic nerve glides    Charges:  PT EVAL: Moderate Complexity, 1 TE  In agreement with evaluation findings and clinical rationale, this evaluation involved MODERATE COMPLEXITY decision making due to 1-2 personal factors/comorbidities, 3 or more body structures involved/activity limitations, and evolving symptoms as documented in the evaluation.                                                                         PLAN OF CARE:    Goals: (to be met in 10 visits)   Goals       Therapy Goals     Patient to report independence with HEP to facilitate self management.  Patient to have L/R LE MMT each at least  4+/5 to facilitate transfers, stair negotiation.   Patient to have lumbar spine AROM grossly WFL to facilitate transfers.   Patient to walk/stand each at least 5 min.   Patient to use appropriate assistive device for improved safety with ambulation/to reduce fall risk.                Frequency / Duration: Patient will be seen 1-2x/week or a total of -2  visits over a 90 day period. Treatment will include: Manual Therapy; Gait training; Neuromuscular Re-education; Self-Care Home Management; Therapeutic Activities; Therapeutic Exercise; Home Exercise Program instruction    Education or treatment limitation: None (attendance, tardy arrival)   Rehab Potential: fair (given hx of chronic LBP, lumbar surgery x 2)     Oswestry Disability Index Score  Score: 74 % (6/11/2024 11:53 AM); initiated SHANEL again today; insufficient time to complete; will attempt next session      Patient/Family/Caregiver was advised of these findings, precautions, and treatment options and has agreed to actively participate in planning and for this course of care.    Thank you for your referral. Please co-sign or sign and return this letter via fax as soon as possible to [phone]. If you have any questions, please contact me at Dept: [phone]    Sincerely,  Electronically signed by therapist: Kalina Beckham, PT  Physician's certification required: Yes  I certify the need for these services furnished under this plan of treatment and while under my care.    X___________________________________________________ Date____________________    Certification From: 1/29/2025  To:4/29/2025      21st Century Cures Act Notice to Patient: Medical documents like this are made available to patients in the interest of transparency. However, be advised this is a medical document and it is intended as [id number] communication between your medical providers. This medical document may contain abbreviations, assessments, medical data, and results or other  terms that are unfamiliar. Medical documents are intended to carry relevant information, facts as evident, and the clinical opinion of the practitioner. As such, this medical document may be written in language that appears blunt or direct. You are encouraged to contact your medical provider and/or [facility] Patient Experience if you have any questions about this medical document.

## (undated) NOTE — LETTER
Patient Name: Melisa Agosto  YOB: 1950          MRN :  [id number]  Date:  1/27/2025  Referring Physician:  Physician Nonstaff     Discharge Summary  Pt has attended 8 visits in Physical Therapy.     Diagnosis:   R ankle sprain Referring Provider:  Willem Ramos DPM  Date of Evaluation:    10/30/2024    Precautions:  fall risk, chronic LBP/LE Next MD visit:   12//21/2024  Date of Surgery: n/a   Insurance Primary/Secondary: MEDICARE / [employer]     # Auth Visits: POC every 10 visits            Subjective:   Was admitted to hospital 12/24-27/2024 for LE weakness, some type of infection.  They wanted to keep me longer, past 1/2025. Treated with antibiotic which helped but also had MRI, to f/u with neurology due to lumbar hx and LE weakness, advised to elevate LE when they are bloated/to stay off of them.  My LE are less bloated and skinny in the mornings. Had f/u with podiatrist 12/12/2024: to discontinue brace, call him if any additional issues. R ankle is back to lilo, but c/o LE weakness at times. Have not worn R ankle brace since saw podiatrits 12/12/2024.   Pain:  40/10        Objective:     Gait:  WFL, R LE ER, slow keyla, no assistive device, no ankle orthosis      AROM (degrees): (* denotes performed with pain)  Foot/Ankle   DF: R 0; L 5  PF: R 50; L 50  INV: R 20; L 20  EV: R 15; L 30  Restriction B feet digit flex/ext       Flexibility:  Gastroc-soleus: R > L restriction    Strength/MMT: (* denotes performed with pain)  Foot/Ankle   DF: R 5/5; L 5/5  PF: R/L each 2/5, c/o weakness  INV: R 5/5; L 5/5  EV: R 5/5; L 5/5     Stairs: pt negotiates 6 inch stairs reciprocally; pt c/o R LE weakness.  Pt reports negotiating stairs daily at home.   Balance: SLS: R/L each ~ 1 sec - pt c/o R hip pain      Assessment:   Pt returns to PT today after last attending 12/4/2024. Pt reports R ankle c/o have resolved, no longer using R ankle brace.  She c/o LE weakness possibly related to  lumbar spine, wishes to resume PT for her lumbar spine.  Hep reviewed to address residual deficits- ankld ROM, gastroc tightness, ankle weakness, balance deficit.    Goals:    (to be met in 8-10 visits)  Patient to report independence with PT HEP to facilitate self management. - MET  Patient to have at least L/R ankle DF to at least neutral to facilitate toe clearance/normal gait mechanics. - MET  Patient to complete L/R SLS each at least 3 sec in order to reduce risk for ankle instability. - PROGRESS  Patient to walk/stand each at least 5 min unlimited by R ankle c/o. - MET  Patient to have R ankle eversion MMT 5/5 in order to reduce risk for R ankle instability. - MET  Patient to reciprocally negotiate stairs unlimited by R ankle. - MET    Plan: Discharge pt to Hep to due pt reporting c/o related to R ankle sprain have resolved, I c HEP. Pt to f/u with DPM/physician for any c/o/concerns.   Date:  11/11/2024               TX#: 3/6-10 - Pt arrived ~ 15 min late.  Date:     11/13/2024            TX#: 4/6-10 - Pt arrived ~ 10 min late. Date:   11/18/2024            TX#: 5/6-10 Date: 11/25/2024  Tx#: 6/10-12 Date: 12/4/2024  Tx #: 7/10-12 - Pt arrived ~ 15 min late.  Date: 1/27/2025  Tx #: 8/10-12   There Ex:   R ankle inversion and eversion using YTB x 20 reps each  Seated B heel/toe raises x 20 reps  Seated R ankle circles using round board x 10 reps each clockwise/counterclockwise  Standing L/R hip abd 2 x  10 reps    There Ex:   R ankle circles x 10 reps each clockwise/counterclockwise  R ankle inversion and eversion using YTB x 20 reps each  Standing B heel/toe raises 2 x 10 reps  Standing L/R gastroc stretch 2 x 20 sec   There Ex:   R ankle alphabet x 1   R ankle inversion and eversion using RTB x 20 reps each  Standing B heel/toe raises 2 x 10 reps  B gastroc stretch on slant board 2 x 30 sec each  4 inch step up x 5 reps R LE, up over down using R LE x 5 reps  There Ex:   R ankle alphabet x 1   R ankle  inversion and eversion using RTB x 20 reps each  Seated hamstrings/gastroc stretch for c/o cramps  when sitting  Standing B heel/toe raises 2 x 10 reps (HEP)  B gastroc stretch on slant board 2 x 30 sec each  Mini staircase negotiation (4, ~ 6 inch steps) x 1   Seated R ankle AROM rockerboard taps PF/DF and inversion/eversion x 10 reps each, pt declined standing due to reporting increased ankle c/o  HEP review; see below.    There Ex:   HEP review; see below.   Gastroc stretch slant board 2 x 20 sec each   L/R SLS with contralateral hip flex-ext, abd x ~ 5 reps each (HEP alternative to marching, per pt inquiry) There Ex:   HEP review; see below.  ankle AROM PF/DF, inversion/eversion, or ankle circles, standing heel/toe raises,  standing gastroc stretch, tandem balance with/without arm sweeps at countertop - discontinue, SLS with contralateral hip flex/abd/ext at countertop    Mini squat x 10 reps - to increase LE strength with stairs   Manual:   Grade II R talocrural ant/post glides   PROM R ankle PF, DF, inversion, eversion  R gastroc stretches 3 x 20 sec each  Manual:   Grade II R talocrural ant/post glides   PROM R ankle PF, DF, inversion, eversion  R gastroc stretches 3 x 20 sec each   R foot MTP/IP flex/ext PROM Manual:   Grade II R talocrural ant/post glides   PROM R ankle PF, DF, inversion, eversion  R gastroc stretches 3 x 20 sec each   R foot MTP/IP flex/ext PROM Manual:   Grade II R talocrural ant/post glides   PROM R ankle PF, DF, inversion, eversion  R gastroc stretches 3 x 20 sec each   R foot MTP/IP flex/ext PROM  Rhythmic stabilization R ankle x ~30 sec Manual:   Grade II R talocrural ant/post glides   PROM R ankle PF, DF, inversion, eversion  R gastroc stretches 3 x 20 sec each   R foot MTP/IP flex/ext PROM    NM RE:  Tandem balance x 20 sec each (HEP) NM RE:   AirEx: Alternate L/R LE marching 10 x 3 sec each NM RE:   Gait training: cuing for heel to toe gait pattern, R LE neutral (avoid excessive  ER)  Heel to toe walking forward/backward 2 x ~ 16 feet   Marching (pt reports completing at home, advised to hold a few seconds): 5 x 5 sec   AirEx: Alternate L/R LE marching 10 x 3 sec each NM RE:  Heel to toe walking forward/backward 2 x ~ 16 feet    AirEx: Alternate L/R LE marching 10 x 3 sec each    TA: Brief review of computer ergonomics, per pt inquiry NM RE:  Rockerboard taps: ant/post and med/lat x ~ 15 reps each, balance x ~ 45 sec  Heel to toe walking forward/backward 3 x ~ 16 feet    AirEx: Alternate L/R LE marching 10 x 3 sec each        TA:   Stitting ankle exercises in effort to reduce c/o; may consider seated heel/toe raises, ankle circles, walk around, limit sitting time    HEP: ankle AROM PF/DF, inversion/eversion, or ankle circles, standing heel/toe raises,  standing gastroc stretch, tandem balance with/without arm sweeps at countertop - discontinue, contralateral hip flex/abd/ext    Charges: 2 TE (25 min) Total Timed Treatment:  25 min  Total Treatment Time: 40min  Insufficient time to administer LEFS; will attempt to send to pt via [employer]    Plan:  See Plan above.     Patient/Family/Caregiver was advised of these findings, precautions, and treatment options and has agreed to actively participate in planning and for this course of care.    Thank you for your referral. If you have any questions, please contact me at Dept: [phone].    Sincerely,  Electronically signed by therapist: Kalina Beckham, PT     Physician's certification required:  Yes  Please co-sign or sign and return this letter via fax as soon as possible to [phone].   I certify the need for these services furnished under this plan of treatment and while under my care.    X___________________________________________________ Date____________________    Certification From: 1/27/2025  To:4/27/2025            21st Century Cures Act Notice to Patient: Medical documents like this are made available to patients in the interest  of transparency. However, be advised this is a medical document and it is intended as [id number]-[id number] communication between your medical providers. This medical document may contain abbreviations, assessments, medical data, and results or other terms that are unfamiliar. Medical documents are intended to carry relevant information, facts as evident, and the clinical opinion of the practitioner. As such, this medical document may be written in language that appears blunt or direct. You are encouraged to contact your medical provider and/or [facility] Patient Experience if you have any questions about this medical document.

## (undated) NOTE — Clinical Note
1/16/2017        Dear Germán Hi MD,    Thank you for allowing me to participate in your patient's care. We appreciate your confidence in their care for your patient.     The patient will find the results of our examination and our treatment recommend

## (undated) NOTE — LETTER
[facility]  [address] [place] [address]  Dept: [phone]  Loc: [phone]       WORK STATUS WORKSHEET    NAME: Victor M Mcdonald  DIAGNOSIS:    1.  Acute chest pain        Next Appointment Date/Time: Sept 28th at [facility]

## (undated) NOTE — LETTER
11/1/2023    Truman Dials        [address]             Dear Truman Tse,      Our records indicate that you are due for an appointment for a Colonoscopy with a physician at [address] [place],[address] - Gastroenterology. Our doctors are booking out about 3-5 months in advance for procedures. Please call our office to schedule a phone screening appointment to plan for the procedure(s). Your medical well-being is important to us. If your insurance requires a referral, please call your primary care office to request one.       Thank you,      The Physicians and Staff at [facility]

## (undated) NOTE — LETTER
[facility] SURGERY SCHEDULING FORM   [address] [facility] [address] [address]   [phone] (scheduling phone) [phone] (scheduling fax)     PATIENT INFORMATION   Patient Name:    Edwin Guzman   :    1950 Allergies: Cyclobenzaprine; Vicodin [Hydrocodone-Acetaminophen]       Completed by:    Cornelius Crouch      Date:    3/23/2018    [facility] will follow its Pre-Admission Assessment and Screening Policy for pre-admission testing.   Physicians that require   additi

## (undated) NOTE — LETTER
[facility], [facility], [place]  [address] [facility], [address] [place] [address]  Dept: [phone]    9/20/2018        Dianna Pearl        [address] [place] [address]             Dear Ayde zamorano

## (undated) NOTE — LETTER
[address], [place]  Authorization for Invasive Procedures  1.  I hereby authorize Dr. Jose Mackenzie , my physician and whomever may be designated as the doctor's assistant, to perform the following operation and/or procedure:  Cardiac Cat 5. I consent to the photographing of the operations or procedures to be performed for the purposes of advancing medicine, science, and/or education, provided my identity is not revealed.  If the procedure has been videotaped, the physician/surgeon will obta __________ Time: ___________    Statement of Physician  My signature below affirms that prior to the time of the procedure, I have explained to the patient and/or her legal representative, the risks and benefits involved in the proposed treatment and any r